# Patient Record
Sex: MALE | Race: WHITE | NOT HISPANIC OR LATINO | Employment: FULL TIME | URBAN - METROPOLITAN AREA
[De-identification: names, ages, dates, MRNs, and addresses within clinical notes are randomized per-mention and may not be internally consistent; named-entity substitution may affect disease eponyms.]

---

## 2017-02-02 PROBLEM — K50.90 CROHN'S DISEASE: Status: ACTIVE | Noted: 2017-02-02

## 2017-02-02 PROBLEM — K58.9 IBS (IRRITABLE BOWEL SYNDROME): Status: ACTIVE | Noted: 2017-02-02

## 2017-02-02 PROBLEM — G43.909 MIGRAINE: Status: ACTIVE | Noted: 2017-02-02

## 2017-02-03 ENCOUNTER — OFFICE VISIT (OUTPATIENT)
Dept: FAMILY MEDICINE CLINIC | Facility: CLINIC | Age: 37
End: 2017-02-03

## 2017-02-03 VITALS
BODY MASS INDEX: 28.44 KG/M2 | HEART RATE: 79 BPM | RESPIRATION RATE: 16 BRPM | TEMPERATURE: 98.1 F | DIASTOLIC BLOOD PRESSURE: 82 MMHG | HEIGHT: 69 IN | SYSTOLIC BLOOD PRESSURE: 122 MMHG | WEIGHT: 192 LBS | OXYGEN SATURATION: 98 %

## 2017-02-03 DIAGNOSIS — R73.03 PRE-DIABETES: Primary | ICD-10-CM

## 2017-02-03 LAB — HBA1C MFR BLD: 6.1 %

## 2017-02-03 PROCEDURE — 99213 OFFICE O/P EST LOW 20 MIN: CPT | Performed by: FAMILY MEDICINE

## 2017-02-03 PROCEDURE — 83036 HEMOGLOBIN GLYCOSYLATED A1C: CPT | Performed by: FAMILY MEDICINE

## 2017-02-03 RX ORDER — SULFASALAZINE 500 MG/1
500 TABLET ORAL 4 TIMES DAILY
COMMUNITY
End: 2020-08-31 | Stop reason: SDUPTHER

## 2017-02-03 NOTE — PROGRESS NOTES
"Subjective   Eric Ramirez is a 36 y.o. male.     Diabetes   He presents for his follow-up diabetic visit. Diabetes type: prediabetes. His disease course has been improving. Hypoglycemia symptoms include dizziness. There are no diabetic associated symptoms. Pertinent negatives for diabetes include no chest pain, no fatigue, no polydipsia, no polyphagia, no polyuria and no weakness. There are no hypoglycemic complications. Symptoms are stable. There are no diabetic complications. Risk factors for coronary artery disease include male sex and family history. Current diabetic treatment includes diet. He is compliant with treatment most of the time. An ACE inhibitor/angiotensin II receptor blocker is not being taken.        The following portions of the patient's history were reviewed and updated as appropriate: allergies, current medications, past social history and problem list.    Review of Systems   Constitutional: Negative for appetite change, diaphoresis, fatigue and unexpected weight change.   Eyes: Negative for visual disturbance.   Respiratory: Negative for chest tightness and shortness of breath.    Cardiovascular: Negative for chest pain, palpitations and leg swelling.   Gastrointestinal: Negative for diarrhea, nausea and vomiting.        UC doing well on current rx   Endocrine: Negative for polydipsia, polyphagia and polyuria.   Skin: Negative for color change.   Neurological: Positive for dizziness. Negative for weakness, light-headedness and numbness.       Objective   Visit Vitals   • /82   • Pulse 79   • Temp 98.1 °F (36.7 °C)   • Resp 16   • Ht 69\" (175.3 cm)   • Wt 192 lb (87.1 kg)   • SpO2 98%   • BMI 28.35 kg/m2     Physical Exam   Constitutional: He is oriented to person, place, and time. He appears well-developed and well-nourished. He is cooperative.   HENT:   Head: Normocephalic.   Right Ear: External ear normal.   Left Ear: External ear normal.   Nose: Nose normal.   Mouth/Throat: " Oropharynx is clear and moist. No oropharyngeal exudate.   Eyes: Conjunctivae are normal. Pupils are equal, round, and reactive to light. No scleral icterus.   Neck: Neck supple. Carotid bruit is not present. No thyromegaly present.   Cardiovascular: Normal rate and regular rhythm.    Pulmonary/Chest: Effort normal and breath sounds normal.   Abdominal: There is no hepatosplenomegaly.   Musculoskeletal: Normal range of motion.   Lymphadenopathy:     He has no cervical adenopathy.   Neurological: He is alert and oriented to person, place, and time.   No focal deficits no lateralizing signs   Skin: Skin is warm and dry. No rash noted.   Psychiatric: He has a normal mood and affect. Cognition and memory are normal.   Nursing note and vitals reviewed.      Assessment/Plan   Problem List Items Addressed This Visit        Other    Pre-diabetes - Primary    Relevant Orders    POC Glycosylated Hemoglobin (Hb A1C) (Completed)          New Medications Ordered This Visit   Medications   • sulfaSALAzine (AZULFIDINE) 500 MG tablet     Sig: Take 500 mg by mouth 4 (Four) Times a Day.   • FOLIC ACID PO     Sig: Take  by mouth Daily.     Continue diabetic diet regular exercise and follow-up in 3 months with another A1c.

## 2017-02-10 ENCOUNTER — OFFICE VISIT (OUTPATIENT)
Dept: FAMILY MEDICINE CLINIC | Facility: CLINIC | Age: 37
End: 2017-02-10

## 2017-02-10 VITALS
HEART RATE: 82 BPM | BODY MASS INDEX: 28.44 KG/M2 | RESPIRATION RATE: 16 BRPM | SYSTOLIC BLOOD PRESSURE: 128 MMHG | DIASTOLIC BLOOD PRESSURE: 86 MMHG | OXYGEN SATURATION: 96 % | WEIGHT: 192 LBS | HEIGHT: 69 IN

## 2017-02-10 DIAGNOSIS — R55 POSTURAL DIZZINESS WITH PRESYNCOPE: ICD-10-CM

## 2017-02-10 DIAGNOSIS — R68.89 FLU-LIKE SYMPTOMS: Primary | ICD-10-CM

## 2017-02-10 DIAGNOSIS — R42 POSTURAL DIZZINESS WITH PRESYNCOPE: ICD-10-CM

## 2017-02-10 LAB
EXPIRATION DATE: NORMAL
FLUAV AG NPH QL: NORMAL
FLUBV AG NPH QL: NORMAL
INTERNAL CONTROL: NORMAL
Lab: NORMAL

## 2017-02-10 PROCEDURE — 87804 INFLUENZA ASSAY W/OPTIC: CPT | Performed by: FAMILY MEDICINE

## 2017-02-10 PROCEDURE — 99213 OFFICE O/P EST LOW 20 MIN: CPT | Performed by: FAMILY MEDICINE

## 2017-02-10 RX ORDER — OSELTAMIVIR PHOSPHATE 75 MG/1
75 CAPSULE ORAL DAILY
Qty: 10 CAPSULE | Refills: 0 | Status: SHIPPED | OUTPATIENT
Start: 2017-02-10 | End: 2017-03-14

## 2017-02-10 NOTE — PROGRESS NOTES
"Subjective   Eric Ramirez is a 36 y.o. male.     Sore Throat    This is a new problem. The problem has been unchanged. There has been no fever. The pain is mild. Associated symptoms include congestion, coughing and headaches. Pertinent negatives include no ear pain or shortness of breath. He has had no exposure to strep. Exposure to: flu. He has tried acetaminophen for the symptoms.   Dizziness   This is a recurrent problem. The current episode started more than 1 month ago. The problem occurs intermittently. The problem has been unchanged. Associated symptoms include congestion, coughing and headaches. Pertinent negatives include no chest pain, chills, fatigue, fever, sore throat or vertigo. Nothing aggravates the symptoms. He has tried rest for the symptoms. The treatment provided no relief.        The following portions of the patient's history were reviewed and updated as appropriate: allergies, current medications, past social history and problem list.    Review of Systems   Constitutional: Negative for chills, fatigue and fever.   HENT: Positive for congestion, postnasal drip, rhinorrhea and sinus pressure. Negative for ear pain and sore throat.    Eyes: Positive for pain.   Respiratory: Positive for cough. Negative for chest tightness, shortness of breath and wheezing.    Cardiovascular: Negative for chest pain, palpitations and leg swelling.   Neurological: Positive for light-headedness and headaches. Negative for vertigo.   Hematological: Negative for adenopathy.       Objective   Visit Vitals   • /86   • Pulse 82   • Resp 16   • Ht 69\" (175.3 cm)   • Wt 192 lb (87.1 kg)   • SpO2 96%   • BMI 28.35 kg/m2     Physical Exam   Constitutional: He is oriented to person, place, and time. He appears well-developed and well-nourished. He is cooperative.   HENT:   Head: Normocephalic.   Right Ear: External ear normal.   Left Ear: External ear normal.   Nose: Nose normal.   Mouth/Throat: Oropharynx is clear " and moist. No oropharyngeal exudate.   Clear postnasal drip   Eyes: Conjunctivae are normal. Pupils are equal, round, and reactive to light. No scleral icterus.   Neck: Neck supple. Carotid bruit is not present. No thyromegaly present.   Cardiovascular: Normal rate and regular rhythm.    Pulmonary/Chest: Effort normal and breath sounds normal. No respiratory distress. He has no wheezes. He has no rales.   Abdominal: There is no hepatosplenomegaly.   Musculoskeletal: Normal range of motion.   Lymphadenopathy:     He has no cervical adenopathy.   Neurological: He is alert and oriented to person, place, and time.   No focal deficits no lateralizing signs   Skin: Skin is warm and dry. No rash noted.   Psychiatric: He has a normal mood and affect. Cognition and memory are normal.   Nursing note and vitals reviewed.      Assessment/Plan   Problem List Items Addressed This Visit     None      Visit Diagnoses     Flu-like symptoms    -  Primary    Relevant Orders    POC Influenza A / B (Completed)    Postural dizziness with presyncope        Relevant Orders    Treadmill Stress Test          New Medications Ordered This Visit   Medications   • oseltamivir (TAMIFLU) 75 MG capsule     Sig: Take 1 capsule by mouth Daily.     Dispense:  10 capsule     Refill:  0     Patient passed the influenza virus in his household so I recommend he take prophylaxis if symptoms worsen increase to twice a day on the Tamiflu. Return to clinic if symptoms persist or worsen. Okay stress test if that is normal we will recommend neurological evaluation of his presyncopal symptoms.

## 2017-02-17 ENCOUNTER — HOSPITAL ENCOUNTER (OUTPATIENT)
Dept: CARDIOLOGY | Facility: HOSPITAL | Age: 37
Discharge: HOME | End: 2017-02-17
Attending: FAMILY MEDICINE | Admitting: FAMILY MEDICINE

## 2017-02-17 VITALS
WEIGHT: 185 LBS | SYSTOLIC BLOOD PRESSURE: 120 MMHG | BODY MASS INDEX: 27.4 KG/M2 | HEART RATE: 86 BPM | HEIGHT: 69 IN | DIASTOLIC BLOOD PRESSURE: 88 MMHG

## 2017-02-17 DIAGNOSIS — R55 POSTURAL DIZZINESS WITH PRESYNCOPE: ICD-10-CM

## 2017-02-17 DIAGNOSIS — R42 POSTURAL DIZZINESS WITH PRESYNCOPE: ICD-10-CM

## 2017-02-17 LAB
BH CV STRESS BP STAGE 1: NORMAL
BH CV STRESS BP STAGE 2: NORMAL
BH CV STRESS BP STAGE 3: NORMAL
BH CV STRESS DURATION MIN STAGE 1: 3
BH CV STRESS DURATION MIN STAGE 2: 3
BH CV STRESS DURATION MIN STAGE 3: 3
BH CV STRESS DURATION MIN STAGE 4: 3
BH CV STRESS DURATION SEC STAGE 1: 0
BH CV STRESS DURATION SEC STAGE 2: 0
BH CV STRESS DURATION SEC STAGE 3: 0
BH CV STRESS DURATION SEC STAGE 4: 0
BH CV STRESS GRADE STAGE 1: 10
BH CV STRESS GRADE STAGE 2: 12
BH CV STRESS GRADE STAGE 3: 14
BH CV STRESS GRADE STAGE 4: 16
BH CV STRESS HR STAGE 1: 114
BH CV STRESS HR STAGE 2: 129
BH CV STRESS HR STAGE 3: 160
BH CV STRESS HR STAGE 4: 179
BH CV STRESS METS STAGE 1: 5
BH CV STRESS METS STAGE 2: 7.5
BH CV STRESS METS STAGE 3: 10
BH CV STRESS METS STAGE 4: 13.5
BH CV STRESS PROTOCOL 1: NORMAL
BH CV STRESS RECOVERY BP: NORMAL MMHG
BH CV STRESS RECOVERY HR: 96 BPM
BH CV STRESS SPEED STAGE 1: 1.7
BH CV STRESS SPEED STAGE 2: 2.5
BH CV STRESS SPEED STAGE 3: 3.4
BH CV STRESS SPEED STAGE 4: 4.2
BH CV STRESS STAGE 1: 1
BH CV STRESS STAGE 2: 2
BH CV STRESS STAGE 3: 3
BH CV STRESS STAGE 4: 4
MAXIMAL PREDICTED HEART RATE: 182 BPM
PERCENT MAX PREDICTED HR: 98.35 %
STRESS BASELINE BP: NORMAL MMHG
STRESS BASELINE HR: 93 BPM
STRESS PERCENT HR: 116 %
STRESS POST ESTIMATED WORKLOAD: 12.5 METS
STRESS POST EXERCISE DUR MIN: 10 MIN
STRESS POST EXERCISE DUR SEC: 31 SEC
STRESS POST PEAK BP: NORMAL MMHG
STRESS POST PEAK HR: 179 BPM
STRESS TARGET HR: 155 BPM

## 2017-02-17 PROCEDURE — 93018 CV STRESS TEST I&R ONLY: CPT | Performed by: INTERNAL MEDICINE

## 2017-02-17 PROCEDURE — 93017 CV STRESS TEST TRACING ONLY: CPT

## 2017-03-14 ENCOUNTER — OFFICE VISIT (OUTPATIENT)
Dept: FAMILY MEDICINE CLINIC | Facility: CLINIC | Age: 37
End: 2017-03-14

## 2017-03-14 VITALS
TEMPERATURE: 98.3 F | WEIGHT: 184 LBS | BODY MASS INDEX: 27.25 KG/M2 | DIASTOLIC BLOOD PRESSURE: 86 MMHG | SYSTOLIC BLOOD PRESSURE: 122 MMHG | OXYGEN SATURATION: 97 % | HEART RATE: 95 BPM | HEIGHT: 69 IN

## 2017-03-14 DIAGNOSIS — J40 BRONCHITIS: Primary | ICD-10-CM

## 2017-03-14 PROCEDURE — 99213 OFFICE O/P EST LOW 20 MIN: CPT | Performed by: NURSE PRACTITIONER

## 2017-03-14 RX ORDER — ALBUTEROL SULFATE 90 UG/1
2 AEROSOL, METERED RESPIRATORY (INHALATION) EVERY 4 HOURS PRN
Qty: 1 INHALER | Refills: 11 | Status: SHIPPED | OUTPATIENT
Start: 2017-03-14 | End: 2017-06-14

## 2017-03-14 RX ORDER — DEXTROMETHORPHAN HYDROBROMIDE AND PROMETHAZINE HYDROCHLORIDE 15; 6.25 MG/5ML; MG/5ML
5 SYRUP ORAL 4 TIMES DAILY PRN
Qty: 240 ML | Refills: 0 | Status: SHIPPED | OUTPATIENT
Start: 2017-03-14 | End: 2017-06-14

## 2017-03-14 RX ORDER — AZITHROMYCIN 250 MG/1
TABLET, FILM COATED ORAL
Qty: 6 TABLET | Refills: 0 | Status: SHIPPED | OUTPATIENT
Start: 2017-03-14 | End: 2017-06-14

## 2017-03-14 NOTE — PROGRESS NOTES
Subjective   Eric Ramirez is a 36 y.o. male.     History of Present Illness 4 day history of nasal and chest congestion, yellow nasal dc, cough productive of thick yellow sputum, fever and myalgia. No sob. Has ear and sinus pressure. Wife is ill with same symptoms. He had a flu vacc this year. Daughter with flu last month. Treated with Nyquil and Tylenol with some relief.    The following portions of the patient's history were reviewed and updated as appropriate: allergies, current medications, past family history, past medical history, past social history, past surgical history and problem list.    Review of Systems   Constitutional: Positive for fatigue and fever. Negative for unexpected weight change.   HENT: Positive for congestion, postnasal drip, rhinorrhea and sinus pressure. Negative for hearing loss, nosebleeds, sore throat, trouble swallowing and voice change.    Eyes: Negative for pain, discharge, redness and visual disturbance.   Respiratory: Positive for cough. Negative for chest tightness, shortness of breath and wheezing.    Cardiovascular: Negative for chest pain, palpitations and leg swelling.   Gastrointestinal: Negative for abdominal distention, abdominal pain, anal bleeding, blood in stool, constipation, diarrhea, nausea and vomiting.   Endocrine: Negative for cold intolerance, heat intolerance, polydipsia, polyphagia and polyuria.   Genitourinary: Negative for dysuria, flank pain, frequency and hematuria.   Musculoskeletal: Positive for myalgias. Negative for arthralgias, gait problem and joint swelling.   Skin: Negative for color change and rash.   Neurological: Negative for dizziness, tremors, seizures, syncope, speech difficulty, weakness, numbness and headaches.   Hematological: Negative.    Psychiatric/Behavioral: Negative.        Objective   Physical Exam   Constitutional: He is oriented to person, place, and time. He appears well-developed and well-nourished. No distress.   HENT:    Head: Normocephalic and atraumatic.   Right Ear: External ear normal.   Left Ear: External ear normal.   Nose: Nose normal.   Mouth/Throat: Oropharynx is clear and moist. No oropharyngeal exudate.   Eyes: Conjunctivae are normal. Right eye exhibits no discharge. Left eye exhibits no discharge. No scleral icterus.   Neck: Normal range of motion. Neck supple. No thyromegaly present.   Cardiovascular: Normal rate, regular rhythm, normal heart sounds and intact distal pulses.  Exam reveals no gallop and no friction rub.    No murmur heard.  Pulmonary/Chest: Effort normal and breath sounds normal. No respiratory distress. He has no wheezes. He has no rales.   Abdominal: Soft. Bowel sounds are normal. He exhibits no distension and no mass. There is no tenderness. There is no rebound and no guarding.   Musculoskeletal: Normal range of motion. He exhibits no edema or deformity.   Lymphadenopathy:     He has no cervical adenopathy.   Neurological: He is alert and oriented to person, place, and time. He has normal reflexes. He displays normal reflexes. No cranial nerve deficit. Coordination normal.   Skin: Skin is warm and dry. No rash noted.   Psychiatric: He has a normal mood and affect. His behavior is normal. Judgment and thought content normal.   Vitals reviewed.      Assessment/Plan   Eric was seen today for uri.    Diagnoses and all orders for this visit:    Bronchitis  -     azithromycin (ZITHROMAX Z-CANDIS) 250 MG tablet; Take 2 tablets the first day, then 1 tablet daily for 4 days.  -     albuterol (PROVENTIL HFA;VENTOLIN HFA) 108 (90 BASE) MCG/ACT inhaler; Inhale 2 puffs Every 4 (Four) Hours As Needed for Wheezing.  -     Chlorcyclizine-Pseudoephed 25-60 MG tablet; Take 25 mg by mouth 2 (Two) Times a Day.  -     promethazine-dextromethorphan (PROMETHAZINE-DM) 6.25-15 MG/5ML syrup; Take 5 mL by mouth 4 (Four) Times a Day As Needed for cough.        Out of window to treat for flu. Instructed patient to be in  isolation for 5 days. Increase fluids and rest. Treat symptoms with Tylenol or Ibuprofen. Follow up for chest pain, worsening cough, SOB, wheezing, or decreased urine output. Stay away from those with compromised immune system. Recommend flu vaccination next year.  Discussed the nature of the disease including, risks, complications, implications, management, safe and proper use of medications. Encouraged therapeutic lifestyle changes including low calorie diet with plenty of fruits and vegetables, daily exercise, medication compliance, and keeping scheduled follow up appointments with me and any other providers. Encouraged patient to have appointment for complete physical, fasting labs, appropriate screenings, and immunizations on an annual basis.

## 2017-05-22 RX ORDER — BUPROPION HYDROCHLORIDE 150 MG/1
TABLET ORAL
Qty: 30 TABLET | Refills: 0 | OUTPATIENT
Start: 2017-05-22

## 2017-06-14 ENCOUNTER — OFFICE VISIT (OUTPATIENT)
Dept: FAMILY MEDICINE CLINIC | Facility: CLINIC | Age: 37
End: 2017-06-14

## 2017-06-14 VITALS
HEART RATE: 86 BPM | OXYGEN SATURATION: 97 % | DIASTOLIC BLOOD PRESSURE: 84 MMHG | BODY MASS INDEX: 26.84 KG/M2 | SYSTOLIC BLOOD PRESSURE: 128 MMHG | WEIGHT: 181.2 LBS | HEIGHT: 69 IN | RESPIRATION RATE: 16 BRPM

## 2017-06-14 DIAGNOSIS — K21.9 GASTROESOPHAGEAL REFLUX DISEASE, ESOPHAGITIS PRESENCE NOT SPECIFIED: ICD-10-CM

## 2017-06-14 DIAGNOSIS — F41.9 ANXIETY: Primary | ICD-10-CM

## 2017-06-14 PROCEDURE — 99213 OFFICE O/P EST LOW 20 MIN: CPT | Performed by: FAMILY MEDICINE

## 2017-06-14 RX ORDER — OMEPRAZOLE 20 MG/1
20 CAPSULE, DELAYED RELEASE ORAL DAILY
Qty: 90 CAPSULE | Refills: 1 | Status: SHIPPED | OUTPATIENT
Start: 2017-06-14 | End: 2017-11-26 | Stop reason: SDUPTHER

## 2017-06-14 RX ORDER — BUPROPION HYDROCHLORIDE 150 MG/1
150 TABLET ORAL DAILY
Qty: 90 TABLET | Refills: 1 | Status: SHIPPED | OUTPATIENT
Start: 2017-06-14 | End: 2017-11-26 | Stop reason: SDUPTHER

## 2017-07-24 ENCOUNTER — OFFICE VISIT (OUTPATIENT)
Dept: FAMILY MEDICINE CLINIC | Facility: CLINIC | Age: 37
End: 2017-07-24

## 2017-07-24 VITALS
RESPIRATION RATE: 16 BRPM | WEIGHT: 176.6 LBS | DIASTOLIC BLOOD PRESSURE: 82 MMHG | OXYGEN SATURATION: 98 % | BODY MASS INDEX: 26.16 KG/M2 | HEART RATE: 101 BPM | TEMPERATURE: 99.1 F | HEIGHT: 69 IN | SYSTOLIC BLOOD PRESSURE: 112 MMHG

## 2017-07-24 DIAGNOSIS — J40 BRONCHITIS: Primary | ICD-10-CM

## 2017-07-24 PROCEDURE — 99213 OFFICE O/P EST LOW 20 MIN: CPT | Performed by: FAMILY MEDICINE

## 2017-07-24 RX ORDER — CEFDINIR 300 MG/1
300 CAPSULE ORAL 2 TIMES DAILY
Qty: 20 CAPSULE | Refills: 0 | Status: SHIPPED | OUTPATIENT
Start: 2017-07-24 | End: 2018-02-17

## 2017-07-24 NOTE — PROGRESS NOTES
"Subjective   Eric Ramirez is a 36 y.o. male.     Fever    This is a new (Cough and congestion started on Saturday night cough is nonproductive) problem. The current episode started in the past 7 days. The problem has been unchanged. The maximum temperature noted was 100 to 100.9 F. Associated symptoms include congestion and coughing. Pertinent negatives include no chest pain, headaches, muscle aches, nausea, sore throat, urinary pain or wheezing. He has tried acetaminophen for the symptoms. The treatment provided moderate relief.   Risk factors: recent travel    Fatigue   This is a new problem. The problem has been unchanged. Associated symptoms include congestion, coughing, fatigue and a fever. Pertinent negatives include no chest pain, chills, headaches, nausea or sore throat. He has tried acetaminophen for the symptoms.        The following portions of the patient's history were reviewed and updated as appropriate: allergies, current medications, past social history and problem list.    Review of Systems   Constitutional: Positive for fatigue and fever. Negative for chills.   HENT: Positive for congestion. Negative for sore throat.    Respiratory: Positive for cough. Negative for chest tightness, shortness of breath and wheezing.    Cardiovascular: Negative for chest pain, palpitations and leg swelling.   Gastrointestinal: Negative for nausea.   Genitourinary: Negative for dysuria and hematuria.   Neurological: Negative for headaches.       Objective   /82  Pulse 101  Temp 99.1 °F (37.3 °C)  Resp 16  Ht 69\" (175.3 cm)  Wt 176 lb 9.6 oz (80.1 kg)  SpO2 98%  BMI 26.08 kg/m2  Physical Exam   Constitutional: He is oriented to person, place, and time. He appears well-developed and well-nourished. He is cooperative.   HENT:   Head: Normocephalic.   Right Ear: External ear normal.   Left Ear: External ear normal.   Nose: Nose normal.   Mouth/Throat: Oropharynx is clear and moist. No oropharyngeal " exudate.   Eyes: Conjunctivae are normal. Pupils are equal, round, and reactive to light. No scleral icterus.   Neck: Neck supple. Carotid bruit is not present. No thyromegaly present.   Cardiovascular: Normal rate and regular rhythm.    Pulmonary/Chest: Effort normal.   Scattered upper airway rhonchi and a few more on the left   Abdominal: There is no hepatosplenomegaly.   Musculoskeletal: Normal range of motion.   Lymphadenopathy:     He has no cervical adenopathy.   Neurological: He is alert and oriented to person, place, and time.   No focal deficits no lateralizing signs   Skin: Skin is warm and dry. No rash noted.   Psychiatric: He has a normal mood and affect. Cognition and memory are normal.   Nursing note and vitals reviewed.      Assessment/Plan   Problem List Items Addressed This Visit     None      Visit Diagnoses     Bronchitis    -  Primary          New Medications Ordered This Visit   Medications   • cefdinir (OMNICEF) 300 MG capsule     Sig: Take 1 capsule by mouth 2 (Two) Times a Day.     Dispense:  20 capsule     Refill:  0     Return to clinic if symptoms persist or worsen increase hydration Mucinex DM twice a day,Has an inhaler at home which he can use as needed

## 2017-08-02 ENCOUNTER — HOSPITAL ENCOUNTER (OUTPATIENT)
Dept: GENERAL RADIOLOGY | Facility: HOSPITAL | Age: 37
Discharge: HOME | End: 2017-08-02
Attending: FAMILY MEDICINE | Admitting: FAMILY MEDICINE

## 2017-08-02 ENCOUNTER — OFFICE VISIT (OUTPATIENT)
Dept: FAMILY MEDICINE CLINIC | Facility: CLINIC | Age: 37
End: 2017-08-02

## 2017-08-02 VITALS
HEART RATE: 93 BPM | SYSTOLIC BLOOD PRESSURE: 118 MMHG | TEMPERATURE: 98.5 F | BODY MASS INDEX: 26.48 KG/M2 | OXYGEN SATURATION: 98 % | HEIGHT: 69 IN | WEIGHT: 178.8 LBS | DIASTOLIC BLOOD PRESSURE: 80 MMHG | RESPIRATION RATE: 16 BRPM

## 2017-08-02 DIAGNOSIS — J40 BRONCHITIS: Primary | ICD-10-CM

## 2017-08-02 DIAGNOSIS — J40 BRONCHITIS: ICD-10-CM

## 2017-08-02 PROCEDURE — 71020 HC CHEST PA AND LATERAL: CPT

## 2017-08-02 PROCEDURE — 99213 OFFICE O/P EST LOW 20 MIN: CPT | Performed by: FAMILY MEDICINE

## 2017-08-02 RX ORDER — AZITHROMYCIN 250 MG/1
TABLET, FILM COATED ORAL
Qty: 6 TABLET | Refills: 0 | Status: SHIPPED | OUTPATIENT
Start: 2017-08-02 | End: 2018-02-17

## 2017-08-02 NOTE — PROGRESS NOTES
"Subjective   Eric Ramirez is a 36 y.o. male.     Cough   This is a recurrent problem. The current episode started 1 to 4 weeks ago. The problem has been unchanged. The problem occurs every few minutes. The cough is non-productive. Associated symptoms include a fever. Pertinent negatives include no chest pain, chills, hemoptysis, nasal congestion, postnasal drip, rash, sore throat, shortness of breath, sweats or wheezing. The symptoms are aggravated by exercise. He has tried a beta-agonist inhaler for the symptoms. The treatment provided mild relief. His past medical history is significant for bronchitis.        The following portions of the patient's history were reviewed and updated as appropriate: allergies, current medications, past social history and problem list.    Review of Systems   Constitutional: Positive for fever. Negative for chills and fatigue.   HENT: Negative.  Negative for postnasal drip and sore throat.    Respiratory: Positive for cough and chest tightness. Negative for hemoptysis, shortness of breath and wheezing.    Cardiovascular: Negative for chest pain, palpitations and leg swelling.   Gastrointestinal: Negative for nausea.   Genitourinary: Negative for dysuria and frequency.   Musculoskeletal: Negative for arthralgias.   Skin: Negative.  Negative for rash.       Objective   /80  Pulse 93  Temp 98.5 °F (36.9 °C)  Resp 16  Ht 69\" (175.3 cm)  Wt 178 lb 12.8 oz (81.1 kg)  SpO2 98%  BMI 26.4 kg/m2  Physical Exam   Constitutional: He is oriented to person, place, and time. He appears well-developed and well-nourished. He is cooperative.   HENT:   Head: Normocephalic.   Right Ear: External ear normal.   Left Ear: External ear normal.   Nose: Nose normal.   Mouth/Throat: Oropharynx is clear and moist.   Eyes: Conjunctivae are normal. Pupils are equal, round, and reactive to light. No scleral icterus.   Neck: Neck supple. Carotid bruit is not present. No thyromegaly present. "   Cardiovascular: Normal rate and regular rhythm.    Pulmonary/Chest: Effort normal.   Upper airway rhonchi and no wheezes   Abdominal: There is no hepatosplenomegaly.   Musculoskeletal: Normal range of motion.   Lymphadenopathy:     He has no cervical adenopathy.   Neurological: He is alert and oriented to person, place, and time.   No focal deficits no lateralizing signs   Skin: Skin is warm and dry. No rash noted.   Psychiatric: He has a normal mood and affect. Cognition and memory are normal.   Nursing note and vitals reviewed.      Assessment/Plan   Problem List Items Addressed This Visit     None      Visit Diagnoses     Bronchitis    -  Primary    Relevant Orders    XR Chest 2 View          New Medications Ordered This Visit   Medications   • azithromycin (ZITHROMAX) 250 MG tablet     Sig: Take 2 tablets the first day, then 1 tablet daily for 4 days.     Dispense:  6 tablet     Refill:  0     Patient will be referred for a chest x-ray may have atypical bronchitis and/or pneumonia will notify results.

## 2017-11-27 RX ORDER — OMEPRAZOLE 20 MG/1
CAPSULE, DELAYED RELEASE ORAL
Qty: 90 CAPSULE | Refills: 0 | Status: SHIPPED | OUTPATIENT
Start: 2017-11-27 | End: 2018-02-17 | Stop reason: SDUPTHER

## 2017-11-27 RX ORDER — BUPROPION HYDROCHLORIDE 150 MG/1
TABLET ORAL
Qty: 90 TABLET | Refills: 0 | Status: SHIPPED | OUTPATIENT
Start: 2017-11-27 | End: 2018-02-17 | Stop reason: SDUPTHER

## 2018-02-17 ENCOUNTER — OFFICE VISIT (OUTPATIENT)
Dept: FAMILY MEDICINE CLINIC | Facility: CLINIC | Age: 38
End: 2018-02-17

## 2018-02-17 VITALS
DIASTOLIC BLOOD PRESSURE: 80 MMHG | BODY MASS INDEX: 27.11 KG/M2 | WEIGHT: 183 LBS | HEIGHT: 69 IN | OXYGEN SATURATION: 96 % | SYSTOLIC BLOOD PRESSURE: 120 MMHG | RESPIRATION RATE: 16 BRPM | TEMPERATURE: 98.7 F | HEART RATE: 72 BPM

## 2018-02-17 DIAGNOSIS — K21.9 GASTROESOPHAGEAL REFLUX DISEASE, ESOPHAGITIS PRESENCE NOT SPECIFIED: ICD-10-CM

## 2018-02-17 DIAGNOSIS — F41.1 GENERALIZED ANXIETY DISORDER: Primary | ICD-10-CM

## 2018-02-17 DIAGNOSIS — Z20.828 EXPOSURE TO THE FLU: ICD-10-CM

## 2018-02-17 PROCEDURE — 99213 OFFICE O/P EST LOW 20 MIN: CPT | Performed by: FAMILY MEDICINE

## 2018-02-17 RX ORDER — OSELTAMIVIR PHOSPHATE 75 MG/1
75 CAPSULE ORAL DAILY
Qty: 10 CAPSULE | Refills: 0 | Status: SHIPPED | OUTPATIENT
Start: 2018-02-17 | End: 2018-08-21

## 2018-02-17 RX ORDER — BUPROPION HYDROCHLORIDE 150 MG/1
150 TABLET ORAL EVERY MORNING
Qty: 90 TABLET | Refills: 1 | Status: SHIPPED | OUTPATIENT
Start: 2018-02-17 | End: 2018-06-18 | Stop reason: SDUPTHER

## 2018-02-17 RX ORDER — OMEPRAZOLE 20 MG/1
20 CAPSULE, DELAYED RELEASE ORAL DAILY
Qty: 90 CAPSULE | Refills: 1 | Status: SHIPPED | OUTPATIENT
Start: 2018-02-17 | End: 2018-08-21 | Stop reason: SDUPTHER

## 2018-02-19 NOTE — PROGRESS NOTES
Subjective   Eric Ramirez is a 37 y.o. male.     Heartburn   He complains of heartburn. He reports no abdominal pain, no chest pain or no nausea. This is a chronic problem. The problem occurs occasionally. The problem has been gradually improving. The symptoms are aggravated by certain foods and stress. Pertinent negatives include no fatigue, melena, orthopnea or weight loss. Risk factors include hiatal hernia. He has tried a PPI for the symptoms. The treatment provided significant relief.   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 Symptoms include nervous/anxious behavior. Patient reports no chest pain, confusion, decreased concentration, dizziness, feeling of choking, irritability, nausea, restlessness, shortness of breath or suicidal ideas. Symptoms occur occasionally. The severity of symptoms is mild. The quality of sleep is good. Nighttime awakenings: occasional.            The following portions of the patient's history were reviewed and updated as appropriate: allergies, current medications, past social history and problem list.    Review of Systems   Constitutional: Negative for appetite change, fatigue, irritability, unexpected weight change and weight loss.   HENT:        Some uri sx had strong flu expxosure   Respiratory: Negative for chest tightness and shortness of breath.    Cardiovascular: Negative for chest pain.   Gastrointestinal: Positive for heartburn. Negative for abdominal distention, abdominal pain, diarrhea, melena and nausea.        Patient experiencing heartburn/acid reflux     Neurological: Negative for dizziness, tremors, weakness, light-headedness and headaches.   Psychiatric/Behavioral: Positive for dysphoric mood and sleep disturbance. Negative for agitation, behavioral problems, confusion, decreased concentration and suicidal ideas. The patient is nervous/anxious.        Objective   /80  Pulse 72  Temp  "98.7 °F (37.1 °C)  Resp 16  Ht 175.3 cm (69\")  Wt 83 kg (183 lb)  SpO2 96%  BMI 27.02 kg/m2  Physical Exam   Constitutional: He is oriented to person, place, and time. He appears well-developed and well-nourished. He is cooperative.   HENT:   Head: Normocephalic.   Right Ear: External ear normal.   Left Ear: External ear normal.   Nose: Nose normal.   Mouth/Throat: Oropharynx is clear and moist.   Eyes: Conjunctivae are normal. Pupils are equal, round, and reactive to light. No scleral icterus.   Neck: Neck supple. Carotid bruit is not present. No thyromegaly present.   Cardiovascular: Normal rate and regular rhythm.    Pulmonary/Chest: Effort normal and breath sounds normal. He has no wheezes. He has no rales.   Abdominal: There is no hepatosplenomegaly.   Musculoskeletal: Normal range of motion.   Lymphadenopathy:     He has no cervical adenopathy.   Neurological: He is alert and oriented to person, place, and time.   No focal deficits no lateralizing signs   Skin: Skin is warm and dry. No rash noted.   Psychiatric: He has a normal mood and affect. Cognition and memory are normal.   Nursing note and vitals reviewed.      Assessment/Plan   Problem List Items Addressed This Visit     Generalized anxiety disorder - Primary    Relevant Medications    buPROPion XL (WELLBUTRIN XL) 150 MG 24 hr tablet      Other Visit Diagnoses     Gastroesophageal reflux disease, esophagitis presence not specified        Relevant Medications    omeprazole (priLOSEC) 20 MG capsule    Exposure to the flu              New Medications Ordered This Visit   Medications   • buPROPion XL (WELLBUTRIN XL) 150 MG 24 hr tablet     Sig: Take 1 tablet by mouth Every Morning.     Dispense:  90 tablet     Refill:  1   • omeprazole (priLOSEC) 20 MG capsule     Sig: Take 1 capsule by mouth Daily.     Dispense:  90 capsule     Refill:  1   • oseltamivir (TAMIFLU) 75 MG capsule     Sig: Take 1 capsule by mouth Daily.     Dispense:  10 capsule     " Refill:  0

## 2018-03-25 ENCOUNTER — OFFICE VISIT (OUTPATIENT)
Dept: RETAIL CLINIC | Facility: CLINIC | Age: 38
End: 2018-03-25

## 2018-03-25 DIAGNOSIS — B35.9 RINGWORM: Primary | ICD-10-CM

## 2018-03-25 PROCEDURE — 99213 OFFICE O/P EST LOW 20 MIN: CPT | Performed by: NURSE PRACTITIONER

## 2018-03-25 NOTE — PROGRESS NOTES
Subjective   Eric Ramirez is a 37 y.o. male.     He was wrestling on a mat with another person and noticed a small rash on right forearm. Non pruritic and no drainage.       Rash   This is a new problem. The current episode started in the past 7 days. The affected locations include the right arm.        The following portions of the patient's history were reviewed and updated as appropriate: allergies, current medications, past family history, past medical history, past social history, past surgical history and problem list.    Review of Systems   All other systems reviewed and are negative.    There were no vitals taken for this visit.   Objective   Physical Exam   Constitutional: He appears well-developed and well-nourished.   HENT:   Head: Normocephalic.   Eyes: Pupils are equal, round, and reactive to light.   Neurological: He is alert.   Skin:   Right forearm noted quarter size lesion with appearance with ringworm   Nursing note and vitals reviewed.       Assessment/Plan   Eric was seen today for rash.    Diagnoses and all orders for this visit:    Ringworm                 FERNANDA Espinal

## 2018-03-25 NOTE — PATIENT INSTRUCTIONS
Clotrimazole skin cream, lotion, or solution  What is this medicine?  CLOTRIMAZOLE (kloe TRIM a zole) is an antifungal medicine. It is used to treat certain kinds of fungal or yeast infections of the skin.  This medicine may be used for other purposes; ask your health care provider or pharmacist if you have questions.  COMMON BRAND NAME(S): Anti-Fungal, Antifungal, Cruex, Desenex, Fungoid, Lotrimin, Lotrimin AF, Lotrimin AF Ringworm  What should I tell my health care provider before I take this medicine?  They need to know if you have any of these conditions:  -an unusual or allergic reaction to clotrimazole, other antifungals or medicines, foods, dyes or preservatives  -pregnant or trying to get pregnant  -breast-feeding  How should I use this medicine?  This medicine is for external use only. Do not take by mouth. Follow the directions on the prescription label. Wash your hands before and after use. If treating a hand or nail infection, wash hands before use only. Apply a thin layer to the affected area and a small amount to the surrounding area. Rub in gently. Do not get this medicine in your eyes. If you do, rinse out with plenty of cool tap water. Use this medicine at regular intervals. Do not use more often than directed. Finish the full course prescribed by your doctor or health care professional even if you think you are better. Do not stop using except on your doctor's advice.  Talk to your pediatrician regarding the use of this medicine in children. While this drug has been used in young children for selected conditions, precautions do apply.  Overdosage: If you think you have taken too much of this medicine contact a poison control center or emergency room at once.  NOTE: This medicine is only for you. Do not share this medicine with others.  What if I miss a dose?  If you miss a dose, use it as soon as you can. If it is almost time for your next dose, use only that dose. Do not use double or extra  doses.  What may interact with this medicine?  -amphotericin b  -topical products that have nystatin  This list may not describe all possible interactions. Give your health care provider a list of all the medicines, herbs, non-prescription drugs, or dietary supplements you use. Also tell them if you smoke, drink alcohol, or use illegal drugs. Some items may interact with your medicine.  What should I watch for while using this medicine?  Tell your doctor or health care professional if your symptoms do not start to improve after 7 days. Do not self-medicate for more than one week.  If you are using this medicine for 'jock itch' be sure to dry the groin completely after bathing. Do not wear underwear that is tight-fitting or made from synthetic fibers like temo or nylon. Wear loose-fitting, cotton underwear.  If you are using this medicine for athlete's foot be sure to dry your feet carefully after bathing, especially between the toes. Do not wear socks made from wool or synthetic materials like temo or nylon. Wear clean cotton socks and change them at least once a day, change them more if your feet sweat a lot. Also, try to wear sandals or shoes that are well-ventilated.  What side effects may I notice from receiving this medicine?  Side effects that usually do not require medical attention (report to your doctor or health care professional if they continue or are bothersome):  -allergic reactions like skin rash, itching or hives, swelling of the face, lips, or tongue  -skin irritation, burning  This list may not describe all possible side effects. Call your doctor for medical advice about side effects. You may report side effects to FDA at 3-847-FDA-8381.  Where should I keep my medicine?  Keep out of the reach of children.  Store at room temperature between 2 to 30 degrees C (36 to 86 degrees F). Do not freeze. Throw away any unused medicine after the expiration date.  NOTE: This sheet is a summary. It may not  cover all possible information. If you have questions about this medicine, talk to your doctor, pharmacist, or health care provider.  © 2018 [employer]vier/Gold Standard (2017-01-18 16:30:18)  Contact Dermatitis  Dermatitis is redness, soreness, and swelling (inflammation) of the skin. Contact dermatitis is a reaction to certain substances that touch the skin. There are two types of contact dermatitis:  · Irritant contact dermatitis. This type is caused by something that irritates your skin, such as dry hands from washing them too much. This type does not require previous exposure to the substance for a reaction to occur. This type is more common.  · Allergic contact dermatitis. This type is caused by a substance that you are allergic to, such as a nickel allergy or poison ivy. This type only occurs if you have been exposed to the substance (allergen) before. Upon a repeat exposure, your body reacts to the substance. This type is less common.  What are the causes?  Many different substances can cause contact dermatitis. Irritant contact dermatitis is most commonly caused by exposure to:  · Makeup.  · Soaps.  · Detergents.  · Bleaches.  · Acids.  · Metal salts, such as nickel.  Allergic contact dermatitis is most commonly caused by exposure to:  · Poisonous plants.  · Chemicals.  · Jewelry.  · Latex.  · Medicines.  · Preservatives in products, such as clothing.  What increases the risk?  This condition is more likely to develop in:  · People who have jobs that expose them to irritants or allergens.  · People who have certain medical conditions, such as asthma or eczema.  What are the signs or symptoms?  Symptoms of this condition may occur anywhere on your body where the irritant has touched you or is touched by you. Symptoms include:  · Dryness or flaking.  · Redness.  · Cracks.  · Itching.  · Pain or a burning feeling.  · Blisters.  · Drainage of small amounts of blood or clear fluid from skin cracks.  With allergic  contact dermatitis, there may also be swelling in areas such as the eyelids, mouth, or genitals.  How is this diagnosed?  This condition is diagnosed with a medical history and physical exam. A patch skin test may be performed to help determine the cause. If the condition is related to your job, you may need to see an occupational medicine specialist.  How is this treated?  Treatment for this condition includes figuring out what caused the reaction and protecting your skin from further contact. Treatment may also include:  · Steroid creams or ointments. Oral steroid medicines may be needed in more severe cases.  · Antibiotics or antibacterial ointments, if a skin infection is present.  · Antihistamine lotion or an antihistamine taken by mouth to ease itching.  · A bandage (dressing).  Follow these instructions at home:  Skin Care   · Moisturize your skin as needed.  · Apply cool compresses to the affected areas.  · Try taking a bath with:  ¨ Epsom salts. Follow the instructions on the packaging. You can get these at your local pharmacy or grocery store.  ¨ Baking soda. Pour a small amount into the bath as directed by your health care provider.  ¨ Colloidal oatmeal. Follow the instructions on the packaging. You can get this at your local pharmacy or grocery store.  · Try applying baking soda paste to your skin. Stir water into baking soda until it reaches a paste-like consistency.  · Do not scratch your skin.  · Bathe less frequently, such as every other day.  · Bathe in lukewarm water. Avoid using hot water.  Medicines   · Take or apply over-the-counter and prescription medicines only as told by your health care provider.  · If you were prescribed an antibiotic medicine, take or apply your antibiotic as told by your health care provider. Do not stop using the antibiotic even if your condition starts to improve.  General instructions   · Keep all follow-up visits as told by your health care provider. This is  important.  · Avoid the substance that caused your reaction. If you do not know what caused it, keep a journal to try to track what caused it. Write down:  ¨ What you eat.  ¨ What cosmetic products you use.  ¨ What you drink.  ¨ What you wear in the affected area. This includes jewelry.  · If you were given a dressing, take care of it as told by your health care provider. This includes when to change and remove it.  Contact a health care provider if:  · Your condition does not improve with treatment.  · Your condition gets worse.  · You have signs of infection such as swelling, tenderness, redness, soreness, or warmth in the affected area.  · You have a fever.  · You have new symptoms.  Get help right away if:  · You have a severe headache, neck pain, or neck stiffness.  · You vomit.  · You feel very sleepy.  · You notice red streaks coming from the affected area.  · Your bone or joint underneath the affected area becomes painful after the skin has healed.  · The affected area turns darker.  · You have difficulty breathing.  This information is not intended to replace advice given to you by your health care provider. Make sure you discuss any questions you have with your health care provider.  Document Released: 12/15/2001 Document Revised: 05/25/2017 Document Reviewed: 05/04/2016  Elsevier Interactive Patient Education © 2017 [employer]

## 2018-06-18 RX ORDER — BUPROPION HYDROCHLORIDE 150 MG/1
TABLET ORAL
Qty: 90 TABLET | Refills: 0 | Status: SHIPPED | OUTPATIENT
Start: 2018-06-18 | End: 2018-08-21 | Stop reason: SDUPTHER

## 2018-08-21 ENCOUNTER — TRANSCRIBE ORDERS (OUTPATIENT)
Dept: LAB | Facility: HOSPITAL | Age: 38
End: 2018-08-21

## 2018-08-21 ENCOUNTER — LAB (OUTPATIENT)
Dept: LAB | Facility: HOSPITAL | Age: 38
End: 2018-08-21

## 2018-08-21 ENCOUNTER — OFFICE VISIT (OUTPATIENT)
Dept: FAMILY MEDICINE CLINIC | Facility: CLINIC | Age: 38
End: 2018-08-21

## 2018-08-21 VITALS
WEIGHT: 179 LBS | BODY MASS INDEX: 26.51 KG/M2 | DIASTOLIC BLOOD PRESSURE: 82 MMHG | RESPIRATION RATE: 16 BRPM | HEIGHT: 69 IN | HEART RATE: 86 BPM | SYSTOLIC BLOOD PRESSURE: 120 MMHG | OXYGEN SATURATION: 98 %

## 2018-08-21 DIAGNOSIS — R73.03 PRE-DIABETES: ICD-10-CM

## 2018-08-21 DIAGNOSIS — E78.01 FAMILIAL HYPERCHOLESTEROLEMIA: Primary | ICD-10-CM

## 2018-08-21 DIAGNOSIS — K51.919 MILD CHRONIC ULCERATIVE COLITIS WITH COMPLICATION (HCC): Primary | ICD-10-CM

## 2018-08-21 DIAGNOSIS — F41.1 GENERALIZED ANXIETY DISORDER: ICD-10-CM

## 2018-08-21 DIAGNOSIS — K51.919 MILD CHRONIC ULCERATIVE COLITIS WITH COMPLICATION (HCC): ICD-10-CM

## 2018-08-21 LAB
ALBUMIN SERPL-MCNC: 4.53 G/DL (ref 3.2–4.8)
ALBUMIN/GLOB SERPL: 2 G/DL (ref 1.5–2.5)
ALP SERPL-CCNC: 83 U/L (ref 25–100)
ALT SERPL W P-5'-P-CCNC: 39 U/L (ref 7–40)
ANION GAP SERPL CALCULATED.3IONS-SCNC: 9 MMOL/L (ref 3–11)
AST SERPL-CCNC: 36 U/L (ref 0–33)
BASOPHILS # BLD AUTO: 0.14 10*3/MM3 (ref 0–0.2)
BASOPHILS NFR BLD AUTO: 2.2 % (ref 0–1)
BILIRUB SERPL-MCNC: 0.9 MG/DL (ref 0.3–1.2)
BUN BLD-MCNC: 10 MG/DL (ref 9–23)
BUN/CREAT SERPL: 12.3 (ref 7–25)
CALCIUM SPEC-SCNC: 9.4 MG/DL (ref 8.7–10.4)
CHLORIDE SERPL-SCNC: 105 MMOL/L (ref 99–109)
CO2 SERPL-SCNC: 26 MMOL/L (ref 20–31)
CREAT BLD-MCNC: 0.81 MG/DL (ref 0.6–1.3)
CRP SERPL-MCNC: 0.24 MG/DL (ref 0–1)
DEPRECATED RDW RBC AUTO: 40.9 FL (ref 37–54)
EOSINOPHIL # BLD AUTO: 0.44 10*3/MM3 (ref 0–0.3)
EOSINOPHIL NFR BLD AUTO: 6.8 % (ref 0–3)
ERYTHROCYTE [DISTWIDTH] IN BLOOD BY AUTOMATED COUNT: 12 % (ref 11.3–14.5)
GFR SERPL CREATININE-BSD FRML MDRD: 106 ML/MIN/1.73
GLOBULIN UR ELPH-MCNC: 2.3 GM/DL
GLUCOSE BLD-MCNC: 104 MG/DL (ref 70–100)
HCT VFR BLD AUTO: 43.6 % (ref 38.9–50.9)
HGB BLD-MCNC: 14.7 G/DL (ref 13.1–17.5)
IMM GRANULOCYTES # BLD: 0.02 10*3/MM3 (ref 0–0.03)
IMM GRANULOCYTES NFR BLD: 0.3 % (ref 0–0.6)
LYMPHOCYTES # BLD AUTO: 1.37 10*3/MM3 (ref 0.6–4.8)
LYMPHOCYTES NFR BLD AUTO: 21.2 % (ref 24–44)
MCH RBC QN AUTO: 31.2 PG (ref 27–31)
MCHC RBC AUTO-ENTMCNC: 33.7 G/DL (ref 32–36)
MCV RBC AUTO: 92.6 FL (ref 80–99)
MONOCYTES # BLD AUTO: 0.52 10*3/MM3 (ref 0–1)
MONOCYTES NFR BLD AUTO: 8.1 % (ref 0–12)
NEUTROPHILS # BLD AUTO: 3.98 10*3/MM3 (ref 1.5–8.3)
NEUTROPHILS NFR BLD AUTO: 61.7 % (ref 41–71)
PLATELET # BLD AUTO: 248 10*3/MM3 (ref 150–450)
PMV BLD AUTO: 10.9 FL (ref 6–12)
POTASSIUM BLD-SCNC: 4.5 MMOL/L (ref 3.5–5.5)
PROT SERPL-MCNC: 6.8 G/DL (ref 5.7–8.2)
RBC # BLD AUTO: 4.71 10*6/MM3 (ref 4.2–5.76)
SODIUM BLD-SCNC: 140 MMOL/L (ref 132–146)
WBC NRBC COR # BLD: 6.45 10*3/MM3 (ref 3.5–10.8)

## 2018-08-21 PROCEDURE — 80053 COMPREHEN METABOLIC PANEL: CPT

## 2018-08-21 PROCEDURE — 86140 C-REACTIVE PROTEIN: CPT | Performed by: INTERNAL MEDICINE

## 2018-08-21 PROCEDURE — 36415 COLL VENOUS BLD VENIPUNCTURE: CPT | Performed by: INTERNAL MEDICINE

## 2018-08-21 PROCEDURE — 85025 COMPLETE CBC W/AUTO DIFF WBC: CPT

## 2018-08-21 PROCEDURE — 99213 OFFICE O/P EST LOW 20 MIN: CPT | Performed by: FAMILY MEDICINE

## 2018-08-21 RX ORDER — BUPROPION HYDROCHLORIDE 150 MG/1
150 TABLET ORAL EVERY MORNING
Qty: 90 TABLET | Refills: 1 | Status: SHIPPED | OUTPATIENT
Start: 2018-08-21 | End: 2020-06-19

## 2018-08-21 RX ORDER — OMEPRAZOLE 20 MG/1
20 CAPSULE, DELAYED RELEASE ORAL DAILY
Qty: 90 CAPSULE | Refills: 1 | Status: SHIPPED | OUTPATIENT
Start: 2018-08-21 | End: 2018-09-10 | Stop reason: SDUPTHER

## 2018-08-22 NOTE — PROGRESS NOTES
"Subjective   Eric Ramirez is a 37 y.o. male.     Heartburn   He complains of heartburn. He reports no abdominal pain, no chest pain, no coughing, no nausea or no sore throat. ok with med. This is a chronic problem. The problem occurs occasionally. The problem has been resolved. The heartburn is of mild intensity. Pertinent negatives include no fatigue.   Anxiety   Presents for follow-up visit. Symptoms include nervous/anxious behavior. Patient reports no chest pain, confusion, decreased concentration, depressed mood, dizziness, excessive worry, nausea, palpitations, restlessness, shortness of breath or suicidal ideas. Primary symptoms comment: ok with med. The quality of sleep is fair. Nighttime awakenings: occasional.            The following portions of the patient's history were reviewed and updated as appropriate: allergies, current medications, past social history and problem list.    Review of Systems   Constitutional: Negative for appetite change and fatigue.   HENT: Positive for postnasal drip. Negative for congestion and sore throat.    Respiratory: Negative for cough, chest tightness and shortness of breath.    Cardiovascular: Negative for chest pain, palpitations and leg swelling.   Gastrointestinal: Positive for heartburn. Negative for abdominal pain, diarrhea and nausea.   Neurological: Negative for dizziness, tremors, weakness, light-headedness and headaches.   Psychiatric/Behavioral: Positive for sleep disturbance. Negative for agitation, behavioral problems, confusion, decreased concentration, dysphoric mood and suicidal ideas. The patient is nervous/anxious.         Changing shifts a lot at work       Objective   /82   Pulse 86   Resp 16   Ht 175.3 cm (69\")   Wt 81.2 kg (179 lb)   SpO2 98%   BMI 26.43 kg/m²   Physical Exam   Constitutional: He is oriented to person, place, and time. He appears well-developed and well-nourished. He is cooperative.   HENT:   Head: Normocephalic. "   Right Ear: External ear normal.   Left Ear: External ear normal.   Nose: Nose normal.   Mouth/Throat: Oropharynx is clear and moist.   Eyes: Pupils are equal, round, and reactive to light. Conjunctivae are normal. No scleral icterus.   Neck: Neck supple. Carotid bruit is not present. No thyromegaly present.   Cardiovascular: Normal rate and regular rhythm.    Pulmonary/Chest: Effort normal and breath sounds normal.   Abdominal: There is no hepatosplenomegaly.   Musculoskeletal: Normal range of motion.   Neurological: He is alert and oriented to person, place, and time.   No focal deficits no lateralizing signs   Skin: Skin is warm and dry. No rash noted.   Psychiatric: He has a normal mood and affect. Cognition and memory are normal.   Nursing note and vitals reviewed.      Assessment/Plan   Problem List Items Addressed This Visit     Generalized anxiety disorder    Relevant Medications    buPROPion XL (WELLBUTRIN XL) 150 MG 24 hr tablet    Pre-diabetes    Relevant Orders    Comprehensive Metabolic Panel    TSH      Other Visit Diagnoses     Familial hypercholesterolemia    -  Primary    Relevant Orders    Comprehensive Metabolic Panel    Lipid Panel    TSH    CBC & Differential          New Medications Ordered This Visit   Medications   • buPROPion XL (WELLBUTRIN XL) 150 MG 24 hr tablet     Sig: Take 1 tablet by mouth Every Morning.     Dispense:  90 tablet     Refill:  1   • omeprazole (priLOSEC) 20 MG capsule     Sig: Take 1 capsule by mouth Daily.     Dispense:  90 capsule     Refill:  1       Consider statin pending lab,

## 2018-09-10 ENCOUNTER — OFFICE VISIT (OUTPATIENT)
Dept: FAMILY MEDICINE CLINIC | Facility: CLINIC | Age: 38
End: 2018-09-10

## 2018-09-10 VITALS
BODY MASS INDEX: 26.81 KG/M2 | WEIGHT: 181 LBS | TEMPERATURE: 97.7 F | HEART RATE: 95 BPM | RESPIRATION RATE: 16 BRPM | DIASTOLIC BLOOD PRESSURE: 82 MMHG | HEIGHT: 69 IN | OXYGEN SATURATION: 98 % | SYSTOLIC BLOOD PRESSURE: 122 MMHG

## 2018-09-10 DIAGNOSIS — J02.9 ACUTE PHARYNGITIS, UNSPECIFIED ETIOLOGY: Primary | ICD-10-CM

## 2018-09-10 DIAGNOSIS — K21.9 GASTROESOPHAGEAL REFLUX DISEASE, ESOPHAGITIS PRESENCE NOT SPECIFIED: ICD-10-CM

## 2018-09-10 PROCEDURE — 99213 OFFICE O/P EST LOW 20 MIN: CPT | Performed by: FAMILY MEDICINE

## 2018-09-10 RX ORDER — AZITHROMYCIN 250 MG/1
TABLET, FILM COATED ORAL
Qty: 6 TABLET | Refills: 0 | Status: SHIPPED | OUTPATIENT
Start: 2018-09-10 | End: 2020-03-19

## 2018-09-10 RX ORDER — OMEPRAZOLE 20 MG/1
20 CAPSULE, DELAYED RELEASE ORAL DAILY
Qty: 90 CAPSULE | Refills: 1 | Status: SHIPPED | OUTPATIENT
Start: 2018-09-10 | End: 2020-09-25 | Stop reason: SDUPTHER

## 2018-09-10 NOTE — PROGRESS NOTES
"Subjective   Eric Ramirez is a 37 y.o. male.     Sore Throat    This is a new problem. The current episode started in the past 7 days. The problem has been gradually worsening. There has been no fever. The pain is mild. Associated symptoms include congestion, coughing, ear pain and headaches. Pertinent negatives include no diarrhea, plugged ear sensation, shortness of breath or vomiting. He has tried acetaminophen for the symptoms. The treatment provided mild relief.        The following portions of the patient's history were reviewed and updated as appropriate: allergies, current medications, past social history and problem list.    Review of Systems   Constitutional: Negative for chills, fatigue and fever.   HENT: Positive for congestion, ear pain, postnasal drip and rhinorrhea. Negative for sinus pressure and sore throat.    Eyes: Negative for pain.   Respiratory: Positive for cough. Negative for shortness of breath.    Cardiovascular: Negative for chest pain and palpitations.   Gastrointestinal: Negative for diarrhea, nausea and vomiting.   Genitourinary: Negative for dysuria and hematuria.   Skin: Negative.    Neurological: Positive for headaches. Negative for dizziness.   Hematological: Negative for adenopathy.       Objective   /82   Pulse 95   Temp 97.7 °F (36.5 °C)   Resp 16   Ht 175.3 cm (69\")   Wt 82.1 kg (181 lb)   SpO2 98%   BMI 26.73 kg/m²   Physical Exam   Constitutional: He is oriented to person, place, and time. He appears well-developed and well-nourished. He is cooperative.   HENT:   Head: Normocephalic.   Right Ear: External ear normal.   Left Ear: External ear normal.   Nose: Nose normal.   Mouth/Throat: Oropharynx is clear and moist.   Throat is red no exudate cloudy postnasal drip TMs are normal   Eyes: Pupils are equal, round, and reactive to light. Conjunctivae are normal. No scleral icterus.   Neck: Neck supple. Carotid bruit is not present. No thyromegaly present. "   Cardiovascular: Normal rate and regular rhythm.    Pulmonary/Chest: Effort normal and breath sounds normal.   Abdominal: There is no hepatosplenomegaly.   Musculoskeletal: Normal range of motion.   Neurological: He is alert and oriented to person, place, and time.   No focal deficits no lateralizing signs   Skin: Skin is warm and dry. No rash noted.   Psychiatric: He has a normal mood and affect. Cognition and memory are normal.   Nursing note and vitals reviewed.      Assessment/Plan   Problem List Items Addressed This Visit     None      Visit Diagnoses     Acute pharyngitis, unspecified etiology    -  Primary    Gastroesophageal reflux disease, esophagitis presence not specified        Relevant Medications    omeprazole (priLOSEC) 20 MG capsule          New Medications Ordered This Visit   Medications   • omeprazole (priLOSEC) 20 MG capsule     Sig: Take 1 capsule by mouth Daily.     Dispense:  90 capsule     Refill:  1   • azithromycin (ZITHROMAX) 250 MG tablet     Sig: Take 2 tablets the first day, then 1 tablet daily for 4 days.     Dispense:  6 tablet     Refill:  0

## 2018-09-25 ENCOUNTER — LAB (OUTPATIENT)
Dept: LAB | Facility: HOSPITAL | Age: 38
End: 2018-09-25

## 2018-09-25 DIAGNOSIS — E78.01 FAMILIAL HYPERCHOLESTEROLEMIA: ICD-10-CM

## 2018-09-25 DIAGNOSIS — R73.03 PRE-DIABETES: ICD-10-CM

## 2018-09-25 LAB
ALBUMIN SERPL-MCNC: 4.48 G/DL (ref 3.2–4.8)
ALBUMIN/GLOB SERPL: 2.1 G/DL (ref 1.5–2.5)
ALP SERPL-CCNC: 99 U/L (ref 25–100)
ALT SERPL W P-5'-P-CCNC: 46 U/L (ref 7–40)
ANION GAP SERPL CALCULATED.3IONS-SCNC: 14 MMOL/L (ref 3–11)
ARTICHOKE IGE QN: 138 MG/DL (ref 0–130)
AST SERPL-CCNC: 34 U/L (ref 0–33)
BASOPHILS # BLD AUTO: 0.11 10*3/MM3 (ref 0–0.2)
BASOPHILS NFR BLD AUTO: 1.5 % (ref 0–1)
BILIRUB SERPL-MCNC: 0.9 MG/DL (ref 0.3–1.2)
BUN BLD-MCNC: 12 MG/DL (ref 9–23)
BUN/CREAT SERPL: 13.5 (ref 7–25)
CALCIUM SPEC-SCNC: 9.4 MG/DL (ref 8.7–10.4)
CHLORIDE SERPL-SCNC: 103 MMOL/L (ref 99–109)
CHOLEST SERPL-MCNC: 186 MG/DL (ref 0–200)
CO2 SERPL-SCNC: 27 MMOL/L (ref 20–31)
CREAT BLD-MCNC: 0.89 MG/DL (ref 0.6–1.3)
DEPRECATED RDW RBC AUTO: 42.4 FL (ref 37–54)
EOSINOPHIL # BLD AUTO: 0.32 10*3/MM3 (ref 0–0.3)
EOSINOPHIL NFR BLD AUTO: 4.4 % (ref 0–3)
ERYTHROCYTE [DISTWIDTH] IN BLOOD BY AUTOMATED COUNT: 12.6 % (ref 11.3–14.5)
GFR SERPL CREATININE-BSD FRML MDRD: 95 ML/MIN/1.73
GLOBULIN UR ELPH-MCNC: 2.1 GM/DL
GLUCOSE BLD-MCNC: 143 MG/DL (ref 70–100)
HCT VFR BLD AUTO: 44 % (ref 38.9–50.9)
HDLC SERPL-MCNC: 47 MG/DL (ref 40–60)
HGB BLD-MCNC: 14.7 G/DL (ref 13.1–17.5)
IMM GRANULOCYTES # BLD: 0.01 10*3/MM3 (ref 0–0.03)
IMM GRANULOCYTES NFR BLD: 0.1 % (ref 0–0.6)
LYMPHOCYTES # BLD AUTO: 1.38 10*3/MM3 (ref 0.6–4.8)
LYMPHOCYTES NFR BLD AUTO: 19 % (ref 24–44)
MCH RBC QN AUTO: 31 PG (ref 27–31)
MCHC RBC AUTO-ENTMCNC: 33.4 G/DL (ref 32–36)
MCV RBC AUTO: 92.8 FL (ref 80–99)
MONOCYTES # BLD AUTO: 0.46 10*3/MM3 (ref 0–1)
MONOCYTES NFR BLD AUTO: 6.3 % (ref 0–12)
NEUTROPHILS # BLD AUTO: 5.01 10*3/MM3 (ref 1.5–8.3)
NEUTROPHILS NFR BLD AUTO: 68.8 % (ref 41–71)
PLATELET # BLD AUTO: 245 10*3/MM3 (ref 150–450)
PMV BLD AUTO: 11.2 FL (ref 6–12)
POTASSIUM BLD-SCNC: 4.2 MMOL/L (ref 3.5–5.5)
PROT SERPL-MCNC: 6.6 G/DL (ref 5.7–8.2)
RBC # BLD AUTO: 4.74 10*6/MM3 (ref 4.2–5.76)
SODIUM BLD-SCNC: 144 MMOL/L (ref 132–146)
TRIGL SERPL-MCNC: 81 MG/DL (ref 0–150)
TSH SERPL DL<=0.05 MIU/L-ACNC: 2.52 MIU/ML (ref 0.35–5.35)
WBC NRBC COR # BLD: 7.28 10*3/MM3 (ref 3.5–10.8)

## 2018-09-25 PROCEDURE — 85025 COMPLETE CBC W/AUTO DIFF WBC: CPT

## 2018-09-25 PROCEDURE — 80053 COMPREHEN METABOLIC PANEL: CPT

## 2018-09-25 PROCEDURE — 36415 COLL VENOUS BLD VENIPUNCTURE: CPT

## 2018-09-25 PROCEDURE — 84443 ASSAY THYROID STIM HORMONE: CPT

## 2018-09-25 PROCEDURE — 80061 LIPID PANEL: CPT

## 2018-10-03 ENCOUNTER — LAB (OUTPATIENT)
Dept: LAB | Facility: HOSPITAL | Age: 38
End: 2018-10-03

## 2018-10-03 ENCOUNTER — TRANSCRIBE ORDERS (OUTPATIENT)
Dept: LAB | Facility: HOSPITAL | Age: 38
End: 2018-10-03

## 2018-10-03 DIAGNOSIS — Z00.00 ROUTINE GENERAL MEDICAL EXAMINATION AT A HEALTH CARE FACILITY: ICD-10-CM

## 2018-10-03 DIAGNOSIS — Z00.00 ROUTINE GENERAL MEDICAL EXAMINATION AT A HEALTH CARE FACILITY: Primary | ICD-10-CM

## 2018-10-03 LAB
BACTERIA UR QL AUTO: NORMAL /HPF
BILIRUB UR QL STRIP: NEGATIVE
CLARITY UR: CLEAR
COLOR UR: YELLOW
GLUCOSE UR STRIP-MCNC: ABNORMAL MG/DL
HGB UR QL STRIP.AUTO: NEGATIVE
HYALINE CASTS UR QL AUTO: NORMAL /LPF
KETONES UR QL STRIP: NEGATIVE
LEUKOCYTE ESTERASE UR QL STRIP.AUTO: NEGATIVE
NITRITE UR QL STRIP: NEGATIVE
PH UR STRIP.AUTO: 5.5 [PH] (ref 5–8)
PROT UR QL STRIP: NEGATIVE
RBC # UR: NORMAL /HPF
REF LAB TEST METHOD: NORMAL
SP GR UR STRIP: 1.02 (ref 1–1.03)
SQUAMOUS #/AREA URNS HPF: NORMAL /HPF
UROBILINOGEN UR QL STRIP: ABNORMAL
WBC UR QL AUTO: NORMAL /HPF

## 2018-10-03 PROCEDURE — 81001 URINALYSIS AUTO W/SCOPE: CPT

## 2019-06-13 RX ORDER — BUPROPION HYDROCHLORIDE 150 MG/1
TABLET ORAL
Qty: 90 TABLET | Refills: 0 | OUTPATIENT
Start: 2019-06-13

## 2019-06-20 ENCOUNTER — TELEPHONE (OUTPATIENT)
Dept: FAMILY MEDICINE CLINIC | Facility: CLINIC | Age: 39
End: 2019-06-20

## 2019-06-20 NOTE — TELEPHONE ENCOUNTER
Patient called in stating that he was out of Wellbutrin. Told patient due to office policy I would not be able to send in a refill for this medication because the last time it was wrote was 08/2018. Patient was wrote a 90 day supply and a refill. Told patient in order to get this medication Dr. Ramos would have to approve the refill and he was not currently in the office. Patient became angry and stated he could not wait and that he took this medication everyday and he needed it refilled. Patient stated it had to be done. Informed patient he would need to come in and see another provider and they may be able to write this medication if he was unable to wait for Dr. Ramos to be back in the office. Patient agreed and was transferred to Megan Cedeño to schedule an appointment with Bri Ferreira. Once on the phone to schedule appointment patient refused to schedule the appointment.

## 2019-06-20 NOTE — TELEPHONE ENCOUNTER
PT CALLED WANTED A REFILL ON HIS buPROPion XL (WELLBUTRIN XL) 150 MG 24 hr tablet MARY SPOKE WITH PT AND ADVISED HIM THAT A PROVIDER WOULD HAVE TO SEE HIM BECAUSE IT WAS PRESCRIBED IN [facility] FOR 90 DAY SUPPLY WITH ONE REFILL. DR LEWIS HAS NOT WRITTEN THE SCRIPT SINCE [facility] MARY ADVISED HIM HE WOULD NEED TO BE SEEN DR LEWIS OUT OF TOWN AND PT WAS OFFERED AN APPOINTMENT WITH [facility] AND REFUSED SAID HE WOULD GO SOMEWHERE ELSE. PT STATED WE WERE GOING TO BE RESPONSIBLE FOR ANY ILL EFFECTS THAT HE MAY HAVE.

## 2019-06-21 RX ORDER — BUPROPION HYDROCHLORIDE 150 MG/1
150 TABLET ORAL EVERY MORNING
Qty: 90 TABLET | Refills: 1 | OUTPATIENT
Start: 2019-06-21

## 2020-03-19 ENCOUNTER — LAB (OUTPATIENT)
Dept: LAB | Facility: HOSPITAL | Age: 40
End: 2020-03-19

## 2020-03-19 ENCOUNTER — OFFICE VISIT (OUTPATIENT)
Dept: GASTROENTEROLOGY | Facility: CLINIC | Age: 40
End: 2020-03-19

## 2020-03-19 VITALS
HEART RATE: 93 BPM | SYSTOLIC BLOOD PRESSURE: 154 MMHG | BODY MASS INDEX: 26.58 KG/M2 | WEIGHT: 180 LBS | DIASTOLIC BLOOD PRESSURE: 103 MMHG | TEMPERATURE: 98.4 F

## 2020-03-19 DIAGNOSIS — M25.542 ARTHRALGIA OF BOTH HANDS: ICD-10-CM

## 2020-03-19 DIAGNOSIS — Z82.69 FAMILY HISTORY OF SCLERODERMA: ICD-10-CM

## 2020-03-19 DIAGNOSIS — Z91.89 HIGH RISK FOR COLON CANCER: ICD-10-CM

## 2020-03-19 DIAGNOSIS — M25.541 ARTHRALGIA OF BOTH HANDS: ICD-10-CM

## 2020-03-19 DIAGNOSIS — R74.8 ELEVATED LIVER ENZYMES: ICD-10-CM

## 2020-03-19 DIAGNOSIS — K51.019 ULCERATIVE PANCOLITIS WITH COMPLICATION (HCC): ICD-10-CM

## 2020-03-19 DIAGNOSIS — K51.019 ULCERATIVE PANCOLITIS WITH COMPLICATION (HCC): Primary | ICD-10-CM

## 2020-03-19 LAB
ALBUMIN SERPL-MCNC: 5.1 G/DL (ref 3.5–5.2)
ALBUMIN/GLOB SERPL: 2.1 G/DL
ALP SERPL-CCNC: 92 U/L (ref 39–117)
ALT SERPL W P-5'-P-CCNC: 33 U/L (ref 1–41)
ANION GAP SERPL CALCULATED.3IONS-SCNC: 10.8 MMOL/L (ref 5–15)
AST SERPL-CCNC: 19 U/L (ref 1–40)
BILIRUB SERPL-MCNC: 0.9 MG/DL (ref 0.2–1.2)
BUN BLD-MCNC: 8 MG/DL (ref 6–20)
BUN/CREAT SERPL: 7.7 (ref 7–25)
CALCIUM SPEC-SCNC: 9.9 MG/DL (ref 8.6–10.5)
CHLORIDE SERPL-SCNC: 96 MMOL/L (ref 98–107)
CO2 SERPL-SCNC: 25.2 MMOL/L (ref 22–29)
CREAT BLD-MCNC: 1.04 MG/DL (ref 0.76–1.27)
GFR SERPL CREATININE-BSD FRML MDRD: 79 ML/MIN/1.73
GLOBULIN UR ELPH-MCNC: 2.4 GM/DL
GLUCOSE BLD-MCNC: 273 MG/DL (ref 65–99)
POTASSIUM BLD-SCNC: 4.4 MMOL/L (ref 3.5–5.2)
PROT SERPL-MCNC: 7.5 G/DL (ref 6–8.5)
SODIUM BLD-SCNC: 132 MMOL/L (ref 136–145)

## 2020-03-19 PROCEDURE — 36415 COLL VENOUS BLD VENIPUNCTURE: CPT

## 2020-03-19 PROCEDURE — 80053 COMPREHEN METABOLIC PANEL: CPT

## 2020-03-19 PROCEDURE — 99204 OFFICE O/P NEW MOD 45 MIN: CPT | Performed by: INTERNAL MEDICINE

## 2020-03-19 NOTE — PROGRESS NOTES
"GASTROENTEROLOGY OFFICE NOTE  Eric Ramirez  [id number]  1980    CARE TEAM  Patient Care Team:  Alcides Ramos MD as PCP - General  Myriam Nelson MD as Consulting Physician (Colon and Rectal Surgery)    FERNANDA Smith     Chief Complaint   Patient presents with   • Ulcerative Colitis     New pt. ulcerative colitis        HISTORY OF PRESENT ILLNESS:  First visit for this 39-year-old white male with history of ulcerative colitis diagnosed in 2013 by Dr. Myriam Mar.  He is referred by his primary care physician.    He gives a history of having presented with chronic diarrhea.  In 2013 a colonoscopy revealed ulcerative colitis.  He does not recall the extent of involvement of his colon.  He was subsequently treated with Lialda and a brief course of steroids.  His last colonoscopy report that I have available from 2016 revealed \"colitis \"moderate \"in the cecum, ascending and transverse colon.      He was not happy with 's management and transitioned his care over to [facility] where he was seen by Dr. Chaves.  While under her care he was very pleased with the improvement of his symptoms with sulfasalazine which he takes currently at a dose of 2 g twice daily along with folic acid.  With this regimen his GI symptoms seem to be in remission without any dysphagia to solids, odynophagia, early satiety unexplained weight loss, melanotic stools, bright red blood per rectum, constipation or diarrhea.    Due to a change in [facility] clinic billing (outpatient clinic visits being billed as hospital-based facility visits), his office visits were going to cost him over thousand dollars per visit and therefore he has had to transition out of care at [facility].    He does complain of arthralgias and is concerned because his sister ultimately passed away of severe scleroderma and he wonders whether he may have a rheumatological disease independent of his inflammatory " bowel disease.        PAST MEDICAL HISTORY  Past Medical History:   Diagnosis Date   • Acute pharyngitis    • Bronchitis    • Chest wall injury    • Gallbladder disease    • Gastritis    • Generalized anxiety disorder    • Internal hemorrhoids    • Labyrinthitis    • Palpitations    • Peptic ulcer    • Pre-diabetes    • Rectal hemorrhage    • Sinusitis    • Ulcerative colitis (CMS/HCC)    • URI (upper respiratory infection)         PAST SURGICAL HISTORY  Past Surgical History:   Procedure Laterality Date   • CHOLECYSTECTOMY     • COLONOSCOPY  07/28/16Maxine Nelson        MEDICATIONS:    Current Outpatient Medications:   •  buPROPion XL (WELLBUTRIN XL) 150 MG 24 hr tablet, Take 1 tablet by mouth Every Morning., Disp: 90 tablet, Rfl: 1  •  Cetirizine HCl (ZYRTEC ALLERGY PO), Take 1 tablet by mouth daily., Disp: , Rfl:   •  FOLIC ACID PO, Take 1 tablet by mouth Daily., Disp: , Rfl:   •  omeprazole (priLOSEC) 20 MG capsule, Take 1 capsule by mouth Daily., Disp: 90 capsule, Rfl: 1  •  Probiotic Product (PROBIOTIC DAILY PO), Take 1 tablet by mouth Daily., Disp: , Rfl:   •  sulfaSALAzine (AZULFIDINE) 500 MG tablet, Take 500 mg by mouth 4 (Four) Times a Day., Disp: , Rfl:     ALLERGIES  Allergies   Allergen Reactions   • Levofloxacin Itching     Throat itching, eyes watering   • Buspirone Itching   • Flagyl [Metronidazole] Hives and Swelling       FAMILY HISTORY:  Family History   Problem Relation Age of Onset   • Hypertension Mother    • Melanoma Father    • Heart attack Father    • Scleroderma Sister    • Pulmonary fibrosis Sister    • Colon polyps Neg Hx    • Colon cancer Neg Hx        SOCIAL HISTORY  Social History     Socioeconomic History   • Marital status:      Spouse name: Not on file   • Number of children: Not on file   • Years of education: Not on file   • Highest education level: Not on file   Tobacco Use   • Smoking status: Former Smoker     Packs/day: 1.00     Years: 15.00     Pack years: 15.00      Types: Cigarettes   • Smokeless tobacco: Never Used   Substance and Sexual Activity   • Alcohol use: Yes     Alcohol/week: 2.0 - 3.0 standard drinks     Types: 2 - 3 Cans of beer per week     Comment: social     • Drug use: No   • Sexual activity: Yes     Partners: Female     Socioeconomic History:  He is  and is a  with 3 children.  He is a  in the city of [place].  He is a non-smoker and only occasionally drinks alcoholic beverages.       REVIEW OF SYSTEMS  Review of Systems   Constitutional: Positive for fatigue.   HENT: Positive for nosebleeds and sinus pressure.    Respiratory: Negative.    Cardiovascular: Negative.    Gastrointestinal: Positive for vomiting.   Endocrine: Negative.    Musculoskeletal: Positive for arthralgias and joint swelling.   Allergic/Immunologic: Positive for environmental allergies.   Neurological: Positive for light-headedness and numbness.   Psychiatric/Behavioral: Positive for agitation, dysphoric mood, sleep disturbance, depressed mood and stress. The patient is nervous/anxious.      I have reviewed the above-noted review of systems    PHYSICAL EXAM   BP (!) 154/103   Pulse 93   General: Alert and oriented x 3. In no apparent or acute distress.  and No stigmata of chronic liver disease  HEENT: Anicteric slcera. Normal oropharynx  Neck: Supple. Without lymphadenopathy  CV: Regular rate and rhythm, S1, S2  Lungs: Clear to ausculation. Without rales, robchi and wheezing  Abdomen:  Soft,non-distended without palpable masses or hepatosplenomeagaly, areas of rebound tenderness or guarding.   Extremeties: without clubbing, cyanosis or edema  Neurologic:  Alert and oriented x 3 without focal motor or sensory deficits  Rectal exam: deferred        Results Review:  I reviewed the patient's new clinical results.  He has a history of elevated serum liver enzymes with AST and ALT elevation a few years ago.      ASSESSMENT  1.-  History of ulcerative  colitis.  I do have limited documentation of his initial colonoscopy indicating the extent of his disease but the fact that his 2016 colonoscopy, while he was relatively asymptomatic and already on therapy, showed right-sided disease would indicate that he had pancolitis.  I will have to get copies of his original report to settle this.  We will also have to get copies from [facility] see what other work-up was done.  All that said, he seems to be doing well on sulfasalazine 2 g p.o. twice daily  2.-  Arthralgias/arthritis with swelling of fingers and hands mostly.  This would be a little atypical for inflammatory bowel disease which usually affects large joints and therefore I think a rheumatological evaluation for exclusion of rheumatological disease, particularly given his family history, would be a good idea.  He will look into finding out the name of the rheumatologist that took care of his sister and, if he needs a referral will call us so we can help him set up an appointment.  3.-  Elevated serum liver enzymes.  Of course the concern would be for primary sclerosing cholangitis.  We will need to recheck his serum liver enzymes.  If still elevated an MRCP to rule out PSC is recommended  4.-  Greater than average risk for colon cancer based on what would appear to be a 7-year history of pancolitis.  Random colon biopsies are recommended every 1 to 2 years and he is therefore due for his colonoscopy since last one was in 2016 but, given the current coronavirus pandemic, elective procedures are being deferred.  He is understanding of this and will discuss an appropriate timing at a later date when conditions will allow.    PLAN  1.-  Obtain old records from [facility] and from  [facility]/Dr. Myriam Mar  2.-  Recheck serum liver enzymes  3.-  MRCP if liver enzymes are elevated  4.-  Continue sulfasalazine 2 g p.o. twice daily and folic acid  "replacement  5.-  Colonoscopy at later date once elective procedures are once again allowed  6.-  Return appointment in 6 to 8 weeks.    I discussed the patients findings and my recommendations with patient    Aaron Montano MD  3/19/2020   14:01    Addendum  March 24, 2020  [facility] records received.  Records indicate \"colitis \"diagnosed in 2014 with symptoms since 2009 and last colonoscopy of July 2016 showing active disease visually but not on biopsy.  -Records indicate prior failure to budesonide, Lialda and Asacol  -Pancolitis is noted on his 2014 colonoscopy.  -T PMT activity level noted to be normal.  -2017 calprotectin was within normal limits    Much of this note is an electronic transcription of spoken language to printed text. Electronic transcription of spoken language may permit erroneous, nonsensical word phrases to be inadvertently transcribed.  Although I have reviewed the note for these errors, some may still be present.                "

## 2020-03-20 ENCOUNTER — TELEPHONE (OUTPATIENT)
Dept: GASTROENTEROLOGY | Facility: CLINIC | Age: 40
End: 2020-03-20

## 2020-03-20 DIAGNOSIS — R74.8 ELEVATED LIVER ENZYMES: Primary | ICD-10-CM

## 2020-03-20 PROBLEM — Z82.69 FAMILY HISTORY OF SCLERODERMA: Status: ACTIVE | Noted: 2020-03-20

## 2020-03-20 PROBLEM — Z91.89 HIGH RISK FOR COLON CANCER: Status: ACTIVE | Noted: 2020-03-20

## 2020-03-20 NOTE — PROGRESS NOTES
Great news!  We were concerned about previously elevated serum liver enzymes.  His current liver enzymes are rocksolid normal.  Specifically his ALT and AST are well within normal range and therefore I think just to be on the safe side it would be a good idea to recheck his serum liver enzymes every 6 months over the next couple years.  My concern for the possibility of primary sclerosing cholangitis is minimal at this time.

## 2020-03-20 NOTE — TELEPHONE ENCOUNTER
I will send  a message to confirm he wants to refer the patient to the [facility] .    Thank you,    YUMIKO Segura

## 2020-03-20 NOTE — TELEPHONE ENCOUNTER
Called Dr.Jennifer Nelson's office and requested a copy of patients colonoscopy in 2014. Confirmed with Marni they will fax the procedure with any pathology to [phone].      Faxed a Request to  and requested Dr. Chaves records on the patient.

## 2020-03-20 NOTE — TELEPHONE ENCOUNTER
Patients wife left me a voicemail yesterday afternoon. She stated that they were going to look into where they wanted you to send the referral to. They've decided they want it sent to the  [facility] Dr. Memo Granados.

## 2020-03-24 ENCOUNTER — TELEPHONE (OUTPATIENT)
Dept: GASTROENTEROLOGY | Facility: CLINIC | Age: 40
End: 2020-03-24

## 2020-03-24 NOTE — TELEPHONE ENCOUNTER
Called patient to notify him we faxed the referral to  at the [facility].       No answer and LVM

## 2020-03-24 NOTE — TELEPHONE ENCOUNTER
"----- Message from Aaron Montano MD sent at 3/23/2020  8:10 AM EDT -----  Imelda Hardin to refer him to Dr. Granados.  Family history of scleroderma and personal history of arthralgias/arthritis.  Rule out scleroderma/other rheumatological diseases.  Thanks  Dr. GRIDER  ----- Message -----  From: Imelda Phelps MA  Sent: 3/20/2020   4:45 PM EDT  To: Aaron Montano MD    \"Patients wife left me a voicemail yesterday afternoon. She stated that they were going to look into where they wanted you to send the referral to. They've decided they want it sent to the  [facility] Dr. Memo Granados. \" Message sent by Kayode Bernal    Do you want to refer the patient to the [facility]?      If yes, What is the reason for the referral?     "

## 2020-04-01 ENCOUNTER — TRANSCRIBE ORDERS (OUTPATIENT)
Dept: LAB | Facility: HOSPITAL | Age: 40
End: 2020-04-01

## 2020-04-01 DIAGNOSIS — K51.919 ULCERATIVE COLITIS WITH COMPLICATION, UNSPECIFIED LOCATION (HCC): Primary | ICD-10-CM

## 2020-04-01 DIAGNOSIS — M07.60 ENTEROPATHIC ARTHRITIS: ICD-10-CM

## 2020-04-01 DIAGNOSIS — Z51.81 ENCOUNTER FOR THERAPEUTIC DRUG MONITORING: ICD-10-CM

## 2020-04-01 DIAGNOSIS — R53.83 OTHER FATIGUE: ICD-10-CM

## 2020-04-03 ENCOUNTER — LAB (OUTPATIENT)
Dept: LAB | Facility: HOSPITAL | Age: 40
End: 2020-04-03

## 2020-04-03 DIAGNOSIS — Z51.81 ENCOUNTER FOR THERAPEUTIC DRUG MONITORING: ICD-10-CM

## 2020-04-03 DIAGNOSIS — M07.60 ENTEROPATHIC ARTHRITIS: ICD-10-CM

## 2020-04-03 DIAGNOSIS — K51.919 ULCERATIVE COLITIS WITH COMPLICATION, UNSPECIFIED LOCATION (HCC): ICD-10-CM

## 2020-04-03 DIAGNOSIS — R53.83 OTHER FATIGUE: ICD-10-CM

## 2020-06-19 ENCOUNTER — OFFICE VISIT (OUTPATIENT)
Dept: ENDOCRINOLOGY | Facility: CLINIC | Age: 40
End: 2020-06-19

## 2020-06-19 ENCOUNTER — LAB (OUTPATIENT)
Dept: LAB | Facility: HOSPITAL | Age: 40
End: 2020-06-19

## 2020-06-19 ENCOUNTER — TELEPHONE (OUTPATIENT)
Dept: ENDOCRINOLOGY | Facility: CLINIC | Age: 40
End: 2020-06-19

## 2020-06-19 VITALS
HEART RATE: 90 BPM | OXYGEN SATURATION: 98 % | DIASTOLIC BLOOD PRESSURE: 72 MMHG | BODY MASS INDEX: 26.25 KG/M2 | WEIGHT: 177.2 LBS | SYSTOLIC BLOOD PRESSURE: 128 MMHG | HEIGHT: 69 IN

## 2020-06-19 DIAGNOSIS — E11.65 TYPE 2 DIABETES MELLITUS WITH HYPERGLYCEMIA, UNSPECIFIED WHETHER LONG TERM INSULIN USE (HCC): Primary | ICD-10-CM

## 2020-06-19 PROBLEM — IMO0002 DIABETES MELLITUS TYPE 2, UNCONTROLLED, WITH COMPLICATIONS: Status: ACTIVE | Noted: 2020-06-19

## 2020-06-19 LAB
ANION GAP SERPL CALCULATED.3IONS-SCNC: 12.6 MMOL/L (ref 5–15)
BUN BLD-MCNC: 13 MG/DL (ref 6–20)
BUN/CREAT SERPL: 13.3 (ref 7–25)
CALCIUM SPEC-SCNC: 9.3 MG/DL (ref 8.6–10.5)
CHLORIDE SERPL-SCNC: 95 MMOL/L (ref 98–107)
CO2 SERPL-SCNC: 24.4 MMOL/L (ref 22–29)
CREAT BLD-MCNC: 0.98 MG/DL (ref 0.76–1.27)
GFR SERPL CREATININE-BSD FRML MDRD: 84 ML/MIN/1.73
GLUCOSE BLD-MCNC: 317 MG/DL (ref 65–99)
GLUCOSE BLDC GLUCOMTR-MCNC: 407 MG/DL (ref 70–130)
HBA1C MFR BLD: 6.7 %
POTASSIUM BLD-SCNC: 3.7 MMOL/L (ref 3.5–5.2)
SODIUM BLD-SCNC: 132 MMOL/L (ref 136–145)

## 2020-06-19 PROCEDURE — 84681 ASSAY OF C-PEPTIDE: CPT | Performed by: INTERNAL MEDICINE

## 2020-06-19 PROCEDURE — 82947 ASSAY GLUCOSE BLOOD QUANT: CPT | Performed by: INTERNAL MEDICINE

## 2020-06-19 PROCEDURE — 80048 BASIC METABOLIC PNL TOTAL CA: CPT | Performed by: INTERNAL MEDICINE

## 2020-06-19 PROCEDURE — 99204 OFFICE O/P NEW MOD 45 MIN: CPT | Performed by: INTERNAL MEDICINE

## 2020-06-19 PROCEDURE — 83036 HEMOGLOBIN GLYCOSYLATED A1C: CPT | Performed by: INTERNAL MEDICINE

## 2020-06-19 RX ORDER — BUPROPION HYDROCHLORIDE 300 MG/1
300 TABLET ORAL DAILY
COMMUNITY
End: 2021-02-05 | Stop reason: SDUPTHER

## 2020-06-19 RX ORDER — METFORMIN HYDROCHLORIDE 500 MG/1
1 TABLET, EXTENDED RELEASE ORAL DAILY
COMMUNITY
Start: 2020-02-01 | End: 2020-06-22

## 2020-06-19 RX ORDER — OXYCODONE AND ACETAMINOPHEN 10; 325 MG/1; MG/1
1 TABLET ORAL AS NEEDED
COMMUNITY
End: 2020-08-31

## 2020-06-19 RX ORDER — INSULIN ASPART INJECTION 100 [IU]/ML
INJECTION, SOLUTION SUBCUTANEOUS
Start: 2020-06-19 | End: 2020-06-22

## 2020-06-19 NOTE — TELEPHONE ENCOUNTER
[facility]: Karina Dykes APRN  Requested recent labs from the office of Karina Dykes.    Phone:  [phone]

## 2020-06-19 NOTE — TELEPHONE ENCOUNTER
Requested records for recent ER visit.  [facility]    [phone]    Medical Records, rep will fax us the above records.

## 2020-06-19 NOTE — PROGRESS NOTES
Chief Complaint   Patient presents with   • Establish Care   • Diabetes        New patient who is being seen in consultation regarding diabetes at the request of Referring, Self     HPI   Eric Ramirez is a 39 y.o. male who presents for evaluation of possible diabetes.    Patient has a history of type unknown type of diabetes. He reports that he was never formally given this diagnosis but has had some concerns about glucose elevation at home. Patient was initially started on metformin 500 XR once daily by his PCP in February.  This regimen has not been changed.  Patient believes that glycemic control is erratic.  He has not noted any effect from metformin.  Patient reports most recent A1c was 7.8. Patient reports good adherence to medications.  He denies any GI side effects related to metformin.    Patient denies any history of hypoglycemia or symptoms concerning for hypoglycemia.  Patient monitors blood glucose 3-4 times per day.Fasting 100-110, up to 270 following meals.  No glucose log or glucometer available for review today.  Patient is working to restrict carbohydrates.  Patient does exercise regularly.     Patient has never had a dilated eye exam  Patient denies acute visual changes.  Patient denies foot related concerns such as numbness, tingling, or pain.  History of dyslipidemia: No  History of renal dysfunction: No    Patient seen in the ER yesterday due to acute pain.  He does report that he received steroids in the ER.  Patient has a strong family history of autoimmune disease.    Patient denies any increased thirst or increased urination.  He does report weight loss and states he has made significant changes to his diet over the past few months.  He denies current any nausea, vomiting, lightheadedness.  He generally feels well apart from ongoing joint pain.    Past Medical History:   Diagnosis Date   • Acute pharyngitis    • Bronchitis    • Chest wall injury    • Gallbladder disease    • Gastritis     • Generalized anxiety disorder    • Internal hemorrhoids    • Labyrinthitis    • Palpitations    • Peptic ulcer    • Pre-diabetes    • Rectal hemorrhage    • Sinusitis    • Ulcerative colitis (CMS/HCC)    • URI (upper respiratory infection)      Past Surgical History:   Procedure Laterality Date   • CHOLECYSTECTOMY     • COLONOSCOPY  07/28/16By Dr. Luci Nelson      Family History   Problem Relation Age of Onset   • Hypertension Mother    • Melanoma Father    • Heart disease Father    • Scleroderma Sister    • Pulmonary fibrosis Sister    • Pyloric stenosis Sister    • Colon polyps Neg Hx    • Colon cancer Neg Hx       Social History     Socioeconomic History   • Marital status:      Spouse name: Not on file   • Number of children: Not on file   • Years of education: Not on file   • Highest education level: Not on file   Tobacco Use   • Smoking status: Former Smoker     Packs/day: 1.00     Years: 15.00     Pack years: 15.00     Types: Cigarettes   • Smokeless tobacco: Never Used   Substance and Sexual Activity   • Alcohol use: Yes     Alcohol/week: 2.0 - 3.0 standard drinks     Types: 2 - 3 Cans of beer per week     Comment: social     • Drug use: No   • Sexual activity: Yes     Partners: Female      Allergies   Allergen Reactions   • Levofloxacin Itching     Throat itching, eyes watering   • Buspirone Itching   • Flagyl [Metronidazole] Hives and Swelling      Current Outpatient Medications on File Prior to Visit   Medication Sig Dispense Refill   • buPROPion XL (WELLBUTRIN XL) 300 MG 24 hr tablet Take 300 mg by mouth Daily.     • Cetirizine HCl (ZYRTEC ALLERGY PO) Take 1 tablet by mouth daily.     • FOLIC ACID PO Take 1 tablet by mouth Daily.     • metFORMIN ER (GLUCOPHAGE-XR) 500 MG 24 hr tablet Take 1 tablet by mouth Daily.     • omeprazole (priLOSEC) 20 MG capsule Take 1 capsule by mouth Daily. 90 capsule 1   • oxyCODONE-acetaminophen (PERCOCET)  MG per tablet Take 1 tablet by mouth As Needed for  "Moderate Pain .     • Probiotic Product (PROBIOTIC DAILY PO) Take 1 tablet by mouth Daily.     • sulfaSALAzine (AZULFIDINE) 500 MG tablet Take 500 mg by mouth 4 (Four) Times a Day.     • [DISCONTINUED] buPROPion XL (WELLBUTRIN XL) 150 MG 24 hr tablet Take 1 tablet by mouth Every Morning. 90 tablet 1     No current facility-administered medications on file prior to visit.         Review of Systems   Constitutional: Positive for unexpected weight loss. Negative for fatigue.   HENT: Negative for voice change.    Eyes: Negative for blurred vision and visual disturbance.   Respiratory: Negative for cough and shortness of breath.    Cardiovascular: Negative for chest pain and palpitations.   Gastrointestinal: Negative for constipation and diarrhea.   Endocrine: Negative for polydipsia, polyphagia and polyuria.   Musculoskeletal: Positive for arthralgias and myalgias.   Skin: Negative for pallor.   Neurological: Negative for dizziness, tremors, seizures, speech difficulty, weakness and confusion.   Psychiatric/Behavioral: Positive for sleep disturbance. The patient is nervous/anxious.        Vitals:    06/19/20 1118   BP: 128/72   Pulse: 90   SpO2: 98%   Weight: 80.4 kg (177 lb 3.2 oz)   Height: 175.3 cm (69\")   Body mass index is 26.17 kg/m².     Physical Exam   Constitutional: Vital signs are normal. He appears well-developed and well-nourished. He is cooperative.  Non-toxic appearance. He does not appear ill.   HENT:   Head: Normocephalic and atraumatic.   Right Ear: Hearing normal.   Left Ear: Hearing normal.   Nose: Nose normal.   Eyes: Pupils are equal, round, and reactive to light. Conjunctivae and EOM are normal.   Neck: Trachea normal. No thyromegaly present.   Cardiovascular: Normal rate, regular rhythm and normal pulses.   No murmur heard.  Pulmonary/Chest: Effort normal and breath sounds normal. No respiratory distress.   Abdominal: Soft. Bowel sounds are normal. He exhibits no distension. There is no " hepatosplenomegaly. There is no tenderness.   Lymphadenopathy:        Head (right side): No submandibular adenopathy present.        Head (left side): No submandibular adenopathy present.     He has no cervical adenopathy.   Neurological: He is alert. He has normal strength and normal reflexes.   Skin: Skin is warm, dry and intact. No rash noted.   Psychiatric: He has a normal mood and affect. His behavior is normal.   Vitals reviewed.         Labs/Imaging  Results for YOLANDA MARCUM (MRN [id number]) as of 6/19/2020 11:23   Ref. Range 3/19/2020 14:50   Glucose Latest Ref Range: 65 - 99 mg/dL 273 (H)   Sodium Latest Ref Range: 136 - 145 mmol/L 132 (L)   Potassium Latest Ref Range: 3.5 - 5.2 mmol/L 4.4   CO2 Latest Ref Range: 22.0 - 29.0 mmol/L 25.2   Chloride Latest Ref Range: 98 - 107 mmol/L 96 (L)   Anion Gap Latest Ref Range: 5.0 - 15.0 mmol/L 10.8   Creatinine Latest Ref Range: 0.76 - 1.27 mg/dL 1.04   BUN Latest Ref Range: 6 - 20 mg/dL 8   BUN/Creatinine Ratio Latest Ref Range: 7.0 - 25.0  7.7   Calcium Latest Ref Range: 8.6 - 10.5 mg/dL 9.9   eGFR Non African Am Latest Ref Range: >60 mL/min/1.73 79   Alkaline Phosphatase Latest Ref Range: 39 - 117 U/L 92   Total Protein Latest Ref Range: 6.0 - 8.5 g/dL 7.5   ALT (SGPT) Latest Ref Range: 1 - 41 U/L 33   AST (SGOT) Latest Ref Range: 1 - 40 U/L 19   Total Bilirubin Latest Ref Range: 0.2 - 1.2 mg/dL 0.9   Albumin Latest Ref Range: 3.50 - 5.20 g/dL 5.10   Globulin Latest Units: gm/dL 2.4   A/G Ratio Latest Units: g/dL 2.1       Labs dated 3/18/2020  GFR 88  Hemoglobin A1c 7.8    Triglycerides 112    Assessment and Plan    Yolanda was seen today for establish care and diabetes.    Diagnoses and all orders for this visit:    Diabetes mellitus type 2, controlled, with complications (CMS/HCC)  -Well-controlled with A1c 6.7%, currently taking metformin 500 mg sustained-release daily  -Patient has a lean phenotype and blood glucose elevation which is out of  proportion to his A1c.  He did receive steroids yesterday (ER not states he was given 10 mg of Decadron.) Which is likely confounding this evaluation.  However, he reports some blood sugars on the 400s despite dietary changes and exercising before he was placed on steroids.  -We will plan to obtain CMP and C-peptide to evaluate endogenous insulin production.  -Until results of the above labs are available discussed starting low-dose insulin. Patient to start Lantus 5 units daily, patient given correctional scale with short acting insulin 1 unit for every 100 points greater than 200.  Discussed that he can utilize this every 6 hours, as needed.  Discussed that if he point he develops nausea, vomiting, abdominal pain or if his blood sugar fails to respond to treatment with insulin he needs to seek treatment in the ER due to concern for DKA.  Discussed risk of hypoglycemia with initiation of insulin therapy.  Discussed symptoms of hypoglycemia along with management of hypoglycemia and when to seek care.  - patient to continue metformin for now  -Patient was advised to monitor blood sugar 4 times daily.  Patient was instructed to bring glucometer to all future appointments. Patient should contact the clinic between appointments with hypoglycemia or persistent hyperglycemia.  Discussed signs and symptoms of hypoglycemia as well as hypoglycemia management via the role of 15's.  Discussed potential for long-term complications with uncontrolled diabetes including nephropathy, neuropathy, retinopathy, increased risk for cardiac disease.  Discussed the role of diet and exercise in the management of diabetes.    -     POCT Glucose  -     POC Glycosylated Hemoglobin (Hb A1C)  -     C-Peptide  -     Basic Metabolic Panel    EGFR normal in March 2020     Will determine follow up interval once labs result.    The patient was instructed to contact the clinic with any interval questions or concerns.    Jil Ledesma MD     Please  note that portions of this document were completed using a voice recognition program. Efforts were made to edit the dictations, but occasionally words are mis-transcribed.

## 2020-06-22 LAB — C PEPTIDE SERPL-MCNC: 3.2 NG/ML (ref 1.1–4.4)

## 2020-06-22 RX ORDER — METFORMIN HYDROCHLORIDE 500 MG/1
2000 TABLET, EXTENDED RELEASE ORAL
Qty: 120 TABLET | Refills: 3 | Status: SHIPPED | OUTPATIENT
Start: 2020-06-22 | End: 2020-08-12

## 2020-06-22 NOTE — TELEPHONE ENCOUNTER
Received ER visit records from [facility] for patient.  Forward records to Dr. Ledesma for review.

## 2020-06-23 ENCOUNTER — TELEPHONE (OUTPATIENT)
Dept: ENDOCRINOLOGY | Facility: CLINIC | Age: 40
End: 2020-06-23

## 2020-06-24 ENCOUNTER — TELEPHONE (OUTPATIENT)
Dept: ENDOCRINOLOGY | Facility: CLINIC | Age: 40
End: 2020-06-24

## 2020-06-24 NOTE — TELEPHONE ENCOUNTER
Informed patient of lab results, pt voiced understanding.    Patient scheduled f/u appointment for 09/21/20 at 1:45 pm.

## 2020-06-24 NOTE — TELEPHONE ENCOUNTER
----- Message from Jil Ledesma MD sent at 6/22/2020  1:20 PM EDT -----  See [employer]

## 2020-07-15 ENCOUNTER — TRANSCRIBE ORDERS (OUTPATIENT)
Dept: LAB | Facility: HOSPITAL | Age: 40
End: 2020-07-15

## 2020-07-15 ENCOUNTER — LAB (OUTPATIENT)
Dept: LAB | Facility: HOSPITAL | Age: 40
End: 2020-07-15

## 2020-07-15 DIAGNOSIS — R53.83 OTHER FATIGUE: ICD-10-CM

## 2020-07-15 DIAGNOSIS — M07.60 ENTEROPATHIC ARTHRITIS: ICD-10-CM

## 2020-07-15 DIAGNOSIS — K51.919 MILD CHRONIC ULCERATIVE COLITIS WITH COMPLICATION (HCC): ICD-10-CM

## 2020-07-15 DIAGNOSIS — K51.919 MILD CHRONIC ULCERATIVE COLITIS WITH COMPLICATION (HCC): Primary | ICD-10-CM

## 2020-07-15 LAB
BASOPHILS # BLD AUTO: 0.08 10*3/MM3 (ref 0–0.2)
BASOPHILS NFR BLD AUTO: 0.8 % (ref 0–1.5)
CRP SERPL-MCNC: 0.84 MG/DL (ref 0–0.5)
DEPRECATED RDW RBC AUTO: 38.9 FL (ref 37–54)
EOSINOPHIL # BLD AUTO: 0.06 10*3/MM3 (ref 0–0.4)
EOSINOPHIL NFR BLD AUTO: 0.6 % (ref 0.3–6.2)
ERYTHROCYTE [DISTWIDTH] IN BLOOD BY AUTOMATED COUNT: 11.4 % (ref 12.3–15.4)
HAV IGM SERPL QL IA: NORMAL
HBV CORE IGM SERPL QL IA: NORMAL
HBV SURFACE AG SERPL QL IA: NORMAL
HCT VFR BLD AUTO: 44.6 % (ref 37.5–51)
HCV AB SER DONR QL: NORMAL
HGB BLD-MCNC: 15.3 G/DL (ref 13–17.7)
IMM GRANULOCYTES # BLD AUTO: 0.04 10*3/MM3 (ref 0–0.05)
IMM GRANULOCYTES NFR BLD AUTO: 0.4 % (ref 0–0.5)
LYMPHOCYTES # BLD AUTO: 0.79 10*3/MM3 (ref 0.7–3.1)
LYMPHOCYTES NFR BLD AUTO: 7.4 % (ref 19.6–45.3)
MCH RBC QN AUTO: 31.7 PG (ref 26.6–33)
MCHC RBC AUTO-ENTMCNC: 34.3 G/DL (ref 31.5–35.7)
MCV RBC AUTO: 92.3 FL (ref 79–97)
MONOCYTES # BLD AUTO: 0.6 10*3/MM3 (ref 0.1–0.9)
MONOCYTES NFR BLD AUTO: 5.7 % (ref 5–12)
NEUTROPHILS NFR BLD AUTO: 85.1 % (ref 42.7–76)
NEUTROPHILS NFR BLD AUTO: 9.04 10*3/MM3 (ref 1.7–7)
NRBC BLD AUTO-RTO: 0 /100 WBC (ref 0–0.2)
PLATELET # BLD AUTO: 268 10*3/MM3 (ref 140–450)
PMV BLD AUTO: 11.2 FL (ref 6–12)
RBC # BLD AUTO: 4.83 10*6/MM3 (ref 4.14–5.8)
WBC # BLD AUTO: 10.61 10*3/MM3 (ref 3.4–10.8)

## 2020-07-15 PROCEDURE — 85652 RBC SED RATE AUTOMATED: CPT

## 2020-07-15 PROCEDURE — 36415 COLL VENOUS BLD VENIPUNCTURE: CPT

## 2020-07-15 PROCEDURE — 80074 ACUTE HEPATITIS PANEL: CPT

## 2020-07-15 PROCEDURE — 85025 COMPLETE CBC W/AUTO DIFF WBC: CPT

## 2020-07-15 PROCEDURE — 86140 C-REACTIVE PROTEIN: CPT

## 2020-07-15 PROCEDURE — 86480 TB TEST CELL IMMUN MEASURE: CPT

## 2020-07-16 LAB
ERYTHROCYTE [SEDIMENTATION RATE] IN BLOOD: 2 MM/HR (ref 0–15)
HOLD SPECIMEN: NORMAL

## 2020-07-18 LAB
QUANTIFERON CRITERIA: NORMAL
QUANTIFERON MITOGEN VALUE: 7.92 IU/ML
QUANTIFERON NIL VALUE: 0.01 IU/ML
QUANTIFERON TB1 AG VALUE: 0.01 IU/ML
QUANTIFERON TB2 AG VALUE: 0.01 IU/ML
QUANTIFERON-TB GOLD PLUS: NEGATIVE

## 2020-07-29 ENCOUNTER — TELEPHONE (OUTPATIENT)
Dept: ENDOCRINOLOGY | Facility: CLINIC | Age: 40
End: 2020-07-29

## 2020-07-29 NOTE — TELEPHONE ENCOUNTER
Patient states he had no symptoms at 500 mg dose.  Patient schedule f/u appointment for 08/12/20 at 9:45 am.

## 2020-07-29 NOTE — TELEPHONE ENCOUNTER
"----- Message from Jil Ledesma MD sent at 7/29/2020  9:24 AM EDT -----  Regarding: FW: Prescription Question  Contact: [phone]      ----- Message -----  From: Meghann Nicolas MA  Sent: 7/29/2020   9:15 AM EDT  To: Jil Ledesma MD  Subject: FW: Prescription Question                            ----- Message -----  From: Eric Ramirez  Sent: 7/28/2020   8:08 PM EDT  To: [facility]  Subject: RE: Prescription Question                        I am not sure if I can deal with this for 3 more weeks. I have already lost approximately 10lbs.  On the nights I have to work I cannot eat, and the days I do not, the food runs right through me.  I hope this is not the only option.      ----- Message -----  From: YUMIKO CEVALLOS  Sent: 7/28/20, 14:24  To: Eric Ramirez  Subject: RE: Prescription Question    Hi Dr. Callie Reyes recommendations are as follows:           \"Please advise patient to titrate back down by 500 mg per week until no longer having symptoms. Once he is at the maximally tolerated dose, he should sent us a week of data and   we can determine if another agent is needed.\"          If you have any further questions, please let me know.      Thank you,    JALYN Robertson          ----- Message -----     From: Eric Ramirez     Sent: 7/28/2020 10:22 AM EDT       To: Jil Ledesma MD  Subject: Prescription Question    Dr. Ledesma,  It seems that after raising my dosage of Metformin I am having a side effect.  I have had pretty severe diarrhea for more then 2 weeks. I was hopeful my body would get use to it, but that doesn't seem to be happening.  I hope there is another option for me!     Thanks,  Emily Ramirez  "

## 2020-08-03 PROCEDURE — U0003 INFECTIOUS AGENT DETECTION BY NUCLEIC ACID (DNA OR RNA); SEVERE ACUTE RESPIRATORY SYNDROME CORONAVIRUS 2 (SARS-COV-2) (CORONAVIRUS DISEASE [COVID-19]), AMPLIFIED PROBE TECHNIQUE, MAKING USE OF HIGH THROUGHPUT TECHNOLOGIES AS DESCRIBED BY CMS-2020-01-R: HCPCS | Performed by: FAMILY MEDICINE

## 2020-08-05 ENCOUNTER — TELEPHONE (OUTPATIENT)
Dept: URGENT CARE | Facility: CLINIC | Age: 40
End: 2020-08-05

## 2020-08-12 ENCOUNTER — OFFICE VISIT (OUTPATIENT)
Dept: ENDOCRINOLOGY | Facility: CLINIC | Age: 40
End: 2020-08-12

## 2020-08-12 VITALS
DIASTOLIC BLOOD PRESSURE: 78 MMHG | SYSTOLIC BLOOD PRESSURE: 118 MMHG | HEIGHT: 69 IN | OXYGEN SATURATION: 98 % | BODY MASS INDEX: 25.51 KG/M2 | WEIGHT: 172.2 LBS | HEART RATE: 85 BPM

## 2020-08-12 DIAGNOSIS — E11.65 TYPE 2 DIABETES MELLITUS WITH HYPERGLYCEMIA, UNSPECIFIED WHETHER LONG TERM INSULIN USE (HCC): Primary | ICD-10-CM

## 2020-08-12 LAB
EXPIRATION DATE: ABNORMAL
GLUCOSE BLDC GLUCOMTR-MCNC: 250 MG/DL (ref 70–130)
Lab: ABNORMAL

## 2020-08-12 PROCEDURE — 82947 ASSAY GLUCOSE BLOOD QUANT: CPT | Performed by: INTERNAL MEDICINE

## 2020-08-12 PROCEDURE — 99213 OFFICE O/P EST LOW 20 MIN: CPT | Performed by: INTERNAL MEDICINE

## 2020-08-12 NOTE — PROGRESS NOTES
"Chief Complaint   Patient presents with   • Follow-up   • Diabetes        HPI   Eric Ramirez is a 39 y.o. male had concerns including Follow-up and Diabetes.      Patient transitioned back to metformin after C-peptide was noted to be detectable in the interval since last visit.  He does report significant GI upset with frequent diarrhea after upper titration of metformin.  He believes the symptoms started with increased to 1000 mg dose.  He contact the office and was given instructions to downward titrate and is currently taking 500 mg daily and does report some continued symptoms at this dose.  He reports some interval weight loss which he attributes to diarrhea and GI upset.  He reports he is checking blood sugar twice daily, no glucometer or glucose log available for review today.  He reports values most commonly in the 200s.  He denies increased thirst or increased urination.    The following portions of the patient's history were reviewed and updated as appropriate: allergies, current medications and past social history.    Review of Systems   Constitutional: Positive for unexpected weight loss.   Respiratory: Negative for cough and shortness of breath.    Cardiovascular: Negative for chest pain and palpitations.   Gastrointestinal: Positive for diarrhea.        /78   Pulse 85   Ht 175.3 cm (69\")   Wt 78.1 kg (172 lb 3.2 oz)   SpO2 98%   BMI 25.43 kg/m²      Physical Exam      Constitutional:  well developed; well nourished  no acute distress  appears stated age   ENT/Thyroid: not examined   Eyes: Conjunctiva: clear   Respiratory:  breathing is unlabored  clear to auscultation bilaterally   Cardiovascular:  regular rate and rhythm   Chest:  Not performed.   Abdomen: soft, non-tender; no masses   : Not performed.   Musculoskeletal: Not performed   Skin: dry and warm   Neuro: normal without focal findings and mental status, speech normal   Psych: mood and affect are within normal limits "       Labs/Imaging  Results for YOLANDA MARCUM (MRN [id number]) as of 8/12/2020 12:26   Ref. Range 6/19/2020 11:32 6/19/2020 12:31 7/15/2020 13:02 8/3/2020 19:22 8/12/2020 10:12   Glucose Latest Ref Range: 70 - 130 mg/dL 407 (A) 317 (H)   250 (A)   Sodium Latest Ref Range: 136 - 145 mmol/L  132 (L)      Potassium Latest Ref Range: 3.5 - 5.2 mmol/L  3.7      CO2 Latest Ref Range: 22.0 - 29.0 mmol/L  24.4      Chloride Latest Ref Range: 98 - 107 mmol/L  95 (L)      Anion Gap Latest Ref Range: 5.0 - 15.0 mmol/L  12.6      Creatinine Latest Ref Range: 0.76 - 1.27 mg/dL  0.98      BUN Latest Ref Range: 6 - 20 mg/dL  13      BUN/Creatinine Ratio Latest Ref Range: 7.0 - 25.0   13.3      Calcium Latest Ref Range: 8.6 - 10.5 mg/dL  9.3      eGFR Non  Am Latest Ref Range: >60 mL/min/1.73  84      Hemoglobin A1C Latest Units: % 6.7       C-Peptide Latest Ref Range: 1.1 - 4.4 ng/mL  3.2          Yolanda was seen today for follow-up and diabetes.    Diagnoses and all orders for this visit:    Type 2 diabetes mellitus with hyperglycemia, unspecified whether long term insulin use (CMS/[facility])   -Most recent hemoglobin A1c 6.7%, too soon to repeat today  -Patient with frequent diarrhea after up titration of metformin, patient also has a history of ulcerative colitis.  -Given GI upset, will plan to discontinue metformin  -Discussed potential options and the possibility of insulin deficiency.  Given insulin level lower than suspected with concurrent hyperglycemia on prior testing and desire to avoid any GI side effects we will plan to resume long-acting insulin at bedtime.  Patient previously tolerated this without incident.  Patient given Tresiba in office today.  Discussed that insulin may change based on formulary coverage.  Patient to start 5 units daily.  If glucose remains elevated after 2 weeks he will increase this to 10 units daily   -Patient was advised to monitor blood sugar 2 times daily.  Patient was instructed to  bring glucometer to all future appointments. Patient should contact the clinic between appointments with hypoglycemia or persistent hyperglycemia.  Discussed signs and symptoms of hypoglycemia as well as hypoglycemia management via the role of 15's.  Discussed potential for long-term complications with uncontrolled diabetes including nephropathy, neuropathy, retinopathy, increased risk for cardiac disease.  Discussed the role of diet and exercise in the management of diabetes.  -Plan to update screening labs at next visit, also consider sending antibodies to evaluate for underlying cause of insulin deficiency.    -     POCT Glucose         Return in about 2 months (around 10/12/2020) for Next scheduled follow up. The patient was instructed to contact the clinic with any interval questions or concerns.    Jil Ledesma MD   Endocrinologist    Please note that portions of this document were completed with a voice recognition program. Efforts were made to edit the dictations, but occasionally words are mis-transcribed.

## 2020-08-28 ENCOUNTER — PATIENT MESSAGE (OUTPATIENT)
Dept: ENDOCRINOLOGY | Facility: CLINIC | Age: 40
End: 2020-08-28

## 2020-08-31 ENCOUNTER — OFFICE VISIT (OUTPATIENT)
Dept: INTERNAL MEDICINE | Facility: CLINIC | Age: 40
End: 2020-08-31

## 2020-08-31 VITALS
HEIGHT: 69 IN | BODY MASS INDEX: 25.21 KG/M2 | OXYGEN SATURATION: 92 % | WEIGHT: 170.2 LBS | TEMPERATURE: 98.2 F | SYSTOLIC BLOOD PRESSURE: 132 MMHG | RESPIRATION RATE: 16 BRPM | DIASTOLIC BLOOD PRESSURE: 90 MMHG | HEART RATE: 81 BPM

## 2020-08-31 DIAGNOSIS — M79.89 SWELLING OF BOTH LOWER EXTREMITIES: ICD-10-CM

## 2020-08-31 DIAGNOSIS — S22.20XD CLOSED FRACTURE OF STERNUM WITH RETROSTERNAL CONTUSION WITH ROUTINE HEALING, SUBSEQUENT ENCOUNTER: Primary | ICD-10-CM

## 2020-08-31 DIAGNOSIS — R00.2 HEART PALPITATIONS: ICD-10-CM

## 2020-08-31 DIAGNOSIS — D64.9 ANEMIA, UNSPECIFIED TYPE: ICD-10-CM

## 2020-08-31 DIAGNOSIS — R79.89 ELEVATED LFTS: Primary | ICD-10-CM

## 2020-08-31 DIAGNOSIS — R91.1 NODULE OF UPPER LOBE OF RIGHT LUNG: ICD-10-CM

## 2020-08-31 DIAGNOSIS — K51.019 ULCERATIVE PANCOLITIS WITH COMPLICATION (HCC): ICD-10-CM

## 2020-08-31 DIAGNOSIS — I49.3 FREQUENT PVCS: ICD-10-CM
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
## 2020-08-31 DIAGNOSIS — S22.20XD CLOSED FRACTURE OF STERNUM WITH RETROSTERNAL CONTUSION WITH ROUTINE HEALING, SUBSEQUENT ENCOUNTER: ICD-10-CM

## 2020-08-31 PROBLEM — F41.9 ANXIETY: Status: ACTIVE | Noted: 2020-08-31

## 2020-08-31 PROBLEM — K63.9 INFLAMMATORY BOWEL ARTHRITIS: Status: ACTIVE | Noted: 2020-06-18

## 2020-08-31 PROBLEM — K21.9 GASTROESOPHAGEAL REFLUX DISEASE WITHOUT ESOPHAGITIS: Status: ACTIVE | Noted: 2019-06-21

## 2020-08-31 PROBLEM — F41.9 ANXIETY: Status: RESOLVED | Noted: 2020-08-31 | Resolved: 2020-08-31

## 2020-08-31 PROBLEM — M07.60 INFLAMMATORY BOWEL ARTHRITIS: Status: ACTIVE | Noted: 2020-06-18

## 2020-08-31 PROBLEM — K85.90 PANCREATITIS: Status: ACTIVE | Noted: 2020-08-31

## 2020-08-31 LAB
ALBUMIN SERPL-MCNC: 4 G/DL (ref 3.5–5.2)
ALBUMIN/GLOB SERPL: 1.3 G/DL
ALP SERPL-CCNC: 88 U/L (ref 39–117)
ALT SERPL W P-5'-P-CCNC: 26 U/L (ref 1–41)
ANION GAP SERPL CALCULATED.3IONS-SCNC: 8.9 MMOL/L (ref 5–15)
AST SERPL-CCNC: 14 U/L (ref 1–40)
BASOPHILS # BLD AUTO: 0.1 10*3/MM3 (ref 0–0.2)
BASOPHILS NFR BLD AUTO: 1.1 % (ref 0–1.5)
BILIRUB SERPL-MCNC: 0.6 MG/DL (ref 0–1.2)
BUN SERPL-MCNC: 12 MG/DL (ref 6–20)
BUN/CREAT SERPL: 13.8 (ref 7–25)
CALCIUM SPEC-SCNC: 9.6 MG/DL (ref 8.6–10.5)
CHLORIDE SERPL-SCNC: 97 MMOL/L (ref 98–107)
CO2 SERPL-SCNC: 28.1 MMOL/L (ref 22–29)
CREAT SERPL-MCNC: 0.87 MG/DL (ref 0.76–1.27)
DEPRECATED RDW RBC AUTO: 38.6 FL (ref 37–54)
EOSINOPHIL # BLD AUTO: 0.48 10*3/MM3 (ref 0–0.4)
EOSINOPHIL NFR BLD AUTO: 5.2 % (ref 0.3–6.2)
ERYTHROCYTE [DISTWIDTH] IN BLOOD BY AUTOMATED COUNT: 11.9 % (ref 12.3–15.4)
FERRITIN SERPL-MCNC: 150 NG/ML (ref 30–400)
GFR SERPL CREATININE-BSD FRML MDRD: 97 ML/MIN/1.73
GLOBULIN UR ELPH-MCNC: 3.1 GM/DL
GLUCOSE SERPL-MCNC: 213 MG/DL (ref 65–99)
HCT VFR BLD AUTO: 44.7 % (ref 37.5–51)
HGB BLD-MCNC: 15.5 G/DL (ref 13–17.7)
IMM GRANULOCYTES # BLD AUTO: 0.03 10*3/MM3 (ref 0–0.05)
IMM GRANULOCYTES NFR BLD AUTO: 0.3 % (ref 0–0.5)
IRON 24H UR-MRATE: 96 MCG/DL (ref 59–158)
IRON SATN MFR SERPL: 20 % (ref 20–50)
LYMPHOCYTES # BLD AUTO: 0.91 10*3/MM3 (ref 0.7–3.1)
LYMPHOCYTES NFR BLD AUTO: 9.9 % (ref 19.6–45.3)
MCH RBC QN AUTO: 30.7 PG (ref 26.6–33)
MCHC RBC AUTO-ENTMCNC: 34.7 G/DL (ref 31.5–35.7)
MCV RBC AUTO: 88.5 FL (ref 79–97)
MONOCYTES # BLD AUTO: 0.61 10*3/MM3 (ref 0.1–0.9)
MONOCYTES NFR BLD AUTO: 6.7 % (ref 5–12)
NEUTROPHILS NFR BLD AUTO: 7.04 10*3/MM3 (ref 1.7–7)
NEUTROPHILS NFR BLD AUTO: 76.8 % (ref 42.7–76)
NRBC BLD AUTO-RTO: 0 /100 WBC (ref 0–0.2)
PLATELET # BLD AUTO: 304 10*3/MM3 (ref 140–450)
PMV BLD AUTO: 10.5 FL (ref 6–12)
POTASSIUM SERPL-SCNC: 4.8 MMOL/L (ref 3.5–5.2)
PROT SERPL-MCNC: 7.1 G/DL (ref 6–8.5)
RBC # BLD AUTO: 5.05 10*6/MM3 (ref 4.14–5.8)
SODIUM SERPL-SCNC: 134 MMOL/L (ref 136–145)
TIBC SERPL-MCNC: 492 MCG/DL (ref 298–536)
TRANSFERRIN SERPL-MCNC: 330 MG/DL (ref 200–360)
WBC # BLD AUTO: 9.17 10*3/MM3 (ref 3.4–10.8)

## 2020-08-31 PROCEDURE — 84466 ASSAY OF TRANSFERRIN: CPT | Performed by: PHYSICIAN ASSISTANT

## 2020-08-31 PROCEDURE — 82728 ASSAY OF FERRITIN: CPT | Performed by: PHYSICIAN ASSISTANT

## 2020-08-31 PROCEDURE — 80053 COMPREHEN METABOLIC PANEL: CPT | Performed by: PHYSICIAN ASSISTANT

## 2020-08-31 PROCEDURE — 99214 OFFICE O/P EST MOD 30 MIN: CPT | Performed by: PHYSICIAN ASSISTANT

## 2020-08-31 PROCEDURE — 36415 COLL VENOUS BLD VENIPUNCTURE: CPT | Performed by: PHYSICIAN ASSISTANT

## 2020-08-31 PROCEDURE — 99213 OFFICE O/P EST LOW 20 MIN: CPT | Performed by: PHYSICIAN ASSISTANT

## 2020-08-31 PROCEDURE — 85025 COMPLETE CBC W/AUTO DIFF WBC: CPT | Performed by: PHYSICIAN ASSISTANT

## 2020-08-31 PROCEDURE — 83540 ASSAY OF IRON: CPT | Performed by: PHYSICIAN ASSISTANT

## 2020-08-31 RX ORDER — GABAPENTIN 300 MG/1
300 CAPSULE ORAL 3 TIMES DAILY
Qty: 90 CAPSULE | Refills: 0 | Status: SHIPPED | OUTPATIENT
Start: 2020-08-31 | End: 2020-09-28

## 2020-08-31 RX ORDER — METHOCARBAMOL 500 MG/1
1 TABLET, FILM COATED ORAL EVERY 8 HOURS
COMMUNITY
Start: 2020-08-24 | End: 2020-08-31 | Stop reason: SDUPTHER

## 2020-08-31 RX ORDER — METHOCARBAMOL 500 MG/1
500 TABLET, FILM COATED ORAL 3 TIMES DAILY
Qty: 90 TABLET | Refills: 0 | Status: SHIPPED | OUTPATIENT
Start: 2020-08-31 | End: 2020-10-06

## 2020-08-31 RX ORDER — SULFASALAZINE 500 MG/1
2000 TABLET ORAL 2 TIMES DAILY
Qty: 240 TABLET | Refills: 2 | Status: SHIPPED | OUTPATIENT
Start: 2020-08-31 | End: 2020-11-29

## 2020-08-31 RX ORDER — OXYCODONE HYDROCHLORIDE 5 MG/1
5 TABLET ORAL EVERY 4 HOURS PRN
Qty: 90 TABLET | Refills: 0 | Status: SHIPPED | OUTPATIENT
Start: 2020-08-31 | End: 2020-09-14 | Stop reason: SDUPTHER

## 2020-08-31 RX ORDER — GABAPENTIN 300 MG/1
1 CAPSULE ORAL EVERY 8 HOURS
COMMUNITY
Start: 2020-08-24 | End: 2020-08-31 | Stop reason: SDUPTHER

## 2020-08-31 RX ORDER — OXYCODONE HYDROCHLORIDE 5 MG/1
1 TABLET ORAL EVERY 4 HOURS PRN
COMMUNITY
Start: 2020-08-24 | End: 2020-08-31 | Stop reason: SDUPTHER

## 2020-08-31 NOTE — TELEPHONE ENCOUNTER
From: Eric Ramirez  To: Jil Ledesma MD  Sent: 8/28/2020 4:45 PM EDT  Subject: Prescription Question    Dr Ledesma,   I was able to  my insulin prescription but they did not give me any needles. Is this so that requires a different prescription?    Thanks  Emily Ramirez

## 2020-09-01 ENCOUNTER — TELEPHONE (OUTPATIENT)
Dept: INTERNAL MEDICINE | Facility: CLINIC | Age: 40
End: 2020-09-01

## 2020-09-01 PROBLEM — R91.1 NODULE OF UPPER LOBE OF RIGHT LUNG: Status: ACTIVE | Noted: 2020-09-01

## 2020-09-02 ENCOUNTER — PRIOR AUTHORIZATION (OUTPATIENT)
Dept: INTERNAL MEDICINE | Facility: CLINIC | Age: 40
End: 2020-09-02

## 2020-09-02 NOTE — TELEPHONE ENCOUNTER
Eric Ramirez [phone]  Spoke to pt, advised of clinical lab results, pt is in agreement with plan. Pt states he recieved a phone call yesterday from the Referred Orthopedic specialist, they advised the pt they wont be able to see him and states that they werr going to send the information back to the PCP office. Advised I will send this message back to PCP to confirm, I do see on the referral where they have contacted us, we will make sure referrals is aware of this issue. Once they have contacted a specialist that is able to see pt, they will contact the pt to schedule the appointment. Pt is in agreement with plan. Good verbal understanding.   Please advise?

## 2020-09-02 NOTE — TELEPHONE ENCOUNTER
Yes, we spoke to ortho as well as his recent [facility] surgery team. We have decided to proceed with referral to cardiothoracic surgery who have agreed to see him, and referral has been sent. If any further intervention is needed, CT surgery will be able to evaluate and manage and I think they will be an excellent resource for us.

## 2020-09-02 NOTE — TELEPHONE ENCOUNTER
Please call pt- labs were relatively normal, other than elevated glucose as we expected. Sodium just slightly decreased but not concerning at this time. Continue with current plan, and we will see him back as scheduled.

## 2020-09-02 NOTE — TELEPHONE ENCOUNTER
Eric Ramirez [phone]  Spoke to pt, advised of clinical message. Pt is in agreement with plan, pt just wants to make sure the specialist is with [facility] and not  if possible. Good verbal understanding.   BELEN

## 2020-09-02 NOTE — TELEPHONE ENCOUNTER
Eric Ramirez (Key: [id number])   Drug: oxycodone HCl 5MG tablets  This request has received a Favorable outcome.  Please note any additional information provided by Dee at the bottom of this request.    Approved:today   PA Case: [id number], Status: Approved, Coverage Starts on: 9/2/2020 12:00:00 AM, Coverage Ends on: 12/1/2020 12:00:00 AM.      [facility] [phone]  Spoke to pharmacist to confirm PA that was approved for oxycodone. Pharmacist confirmed, and states pt picked up the prescription with [employer] card, will run claim on next refill date through insurance. Good verbal understanding.     Emily Ramirez [phone]  Spoke to pt, advised of clinical message. Confirmed we try to do a reimbursement since [facility] states pt paid of it OOP. Pt states no worries with [employer] it was only $16.00. Advised since the PA was approved during the next fill you can have insurance process that to see if the copayment would be cheaper for him. Good verbal understanding.

## 2020-09-14 DIAGNOSIS — S22.20XD CLOSED FRACTURE OF STERNUM WITH RETROSTERNAL CONTUSION WITH ROUTINE HEALING, SUBSEQUENT ENCOUNTER: ICD-10-CM
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
## 2020-09-15 RX ORDER — OXYCODONE HYDROCHLORIDE 5 MG/1
5 TABLET ORAL EVERY 4 HOURS PRN
Qty: 90 TABLET | Refills: 0 | Status: SHIPPED | OUTPATIENT
Start: 2020-09-15 | End: 2020-10-26

## 2020-09-25 RX ORDER — OMEPRAZOLE 20 MG/1
20 CAPSULE, DELAYED RELEASE ORAL DAILY
Qty: 90 CAPSULE | Refills: 1 | Status: SHIPPED | OUTPATIENT
Start: 2020-09-25 | End: 2021-03-21 | Stop reason: SDUPTHER

## 2020-09-26 DIAGNOSIS — S22.20XD CLOSED FRACTURE OF STERNUM WITH RETROSTERNAL CONTUSION WITH ROUTINE HEALING, SUBSEQUENT ENCOUNTER: ICD-10-CM
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
## 2020-09-28 ENCOUNTER — OFFICE VISIT (OUTPATIENT)
Dept: CARDIAC SURGERY | Facility: CLINIC | Age: 40
End: 2020-09-28

## 2020-09-28 VITALS
WEIGHT: 184 LBS | BODY MASS INDEX: 27.25 KG/M2 | DIASTOLIC BLOOD PRESSURE: 91 MMHG | HEART RATE: 68 BPM | SYSTOLIC BLOOD PRESSURE: 158 MMHG | TEMPERATURE: 97.8 F | HEIGHT: 69 IN | OXYGEN SATURATION: 98 %

## 2020-09-28 DIAGNOSIS — S22.20XD CLOSED FRACTURE OF STERNUM WITH RETROSTERNAL CONTUSION WITH ROUTINE HEALING, SUBSEQUENT ENCOUNTER: ICD-10-CM
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
## 2020-09-28 DIAGNOSIS — R91.1 LUNG NODULE: Primary | ICD-10-CM

## 2020-09-28 DIAGNOSIS — S22.22XA CLOSED FRACTURE OF BODY OF STERNUM, INITIAL ENCOUNTER: ICD-10-CM

## 2020-09-28 DIAGNOSIS — Z00.6 EXAMINATION FOR NORMAL COMPARISON FOR CLINICAL RESEARCH: Primary | ICD-10-CM

## 2020-09-28 PROCEDURE — 99205 OFFICE O/P NEW HI 60 MIN: CPT | Performed by: THORACIC SURGERY (CARDIOTHORACIC VASCULAR SURGERY)
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
## 2020-09-29 DIAGNOSIS — S22.20XD CLOSED FRACTURE OF STERNUM WITH RETROSTERNAL CONTUSION WITH ROUTINE HEALING, SUBSEQUENT ENCOUNTER: ICD-10-CM

## 2020-09-29 PROBLEM — S22.22XA CLOSED FRACTURE OF BODY OF STERNUM: Status: ACTIVE | Noted: 2020-09-29

## 2020-09-29 RX ORDER — GABAPENTIN 300 MG/1
300 CAPSULE ORAL 3 TIMES DAILY
Qty: 90 CAPSULE | Refills: 0 | OUTPATIENT
Start: 2020-09-29

## 2020-09-29 RX ORDER — GABAPENTIN 300 MG/1
CAPSULE ORAL
Qty: 90 CAPSULE | Refills: 1 | Status: SHIPPED | OUTPATIENT
Start: 2020-09-29 | End: 2020-10-06

## 2020-10-02 ENCOUNTER — OFFICE VISIT (OUTPATIENT)
Dept: INTERNAL MEDICINE | Facility: CLINIC | Age: 40
End: 2020-10-02

## 2020-10-02 VITALS
RESPIRATION RATE: 15 BRPM | TEMPERATURE: 98.2 F | WEIGHT: 181 LBS | DIASTOLIC BLOOD PRESSURE: 90 MMHG | SYSTOLIC BLOOD PRESSURE: 140 MMHG | HEART RATE: 72 BPM | OXYGEN SATURATION: 96 % | HEIGHT: 69 IN | BODY MASS INDEX: 26.81 KG/M2

## 2020-10-02 DIAGNOSIS — S22.20XD CLOSED FRACTURE OF STERNUM WITH ROUTINE HEALING, UNSPECIFIED PORTION OF STERNUM, SUBSEQUENT ENCOUNTER: Primary | ICD-10-CM
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
## 2020-10-02 PROCEDURE — 99213 OFFICE O/P EST LOW 20 MIN: CPT | Performed by: PHYSICIAN ASSISTANT

## 2020-10-02 RX ORDER — OXYCODONE HYDROCHLORIDE 5 MG/1
5 TABLET ORAL EVERY 4 HOURS PRN
Qty: 90 TABLET | Refills: 0 | OUTPATIENT
Start: 2020-10-02
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
## 2020-10-06 ENCOUNTER — CONSULT (OUTPATIENT)
Dept: CARDIOLOGY | Facility: CLINIC | Age: 40
End: 2020-10-06

## 2020-10-06 VITALS
DIASTOLIC BLOOD PRESSURE: 80 MMHG | BODY MASS INDEX: 26.96 KG/M2 | HEART RATE: 75 BPM | OXYGEN SATURATION: 98 % | HEIGHT: 69 IN | TEMPERATURE: 97.5 F | SYSTOLIC BLOOD PRESSURE: 138 MMHG | WEIGHT: 182 LBS

## 2020-10-06 DIAGNOSIS — I49.3 PVC'S (PREMATURE VENTRICULAR CONTRACTIONS): Primary | ICD-10-CM

## 2020-10-06 PROCEDURE — 93000 ELECTROCARDIOGRAM COMPLETE: CPT | Performed by: INTERNAL MEDICINE

## 2020-10-06 PROCEDURE — 99244 OFF/OP CNSLTJ NEW/EST MOD 40: CPT | Performed by: INTERNAL MEDICINE

## 2020-10-14 RX ORDER — HYDROCHLOROTHIAZIDE 12.5 MG/1
12.5 CAPSULE, GELATIN COATED ORAL DAILY
Qty: 30 CAPSULE | Refills: 11 | Status: SHIPPED | OUTPATIENT
Start: 2020-10-14 | End: 2020-10-29 | Stop reason: ALTCHOICE

## 2020-10-23 ENCOUNTER — TELEPHONE (OUTPATIENT)
Dept: INTERNAL MEDICINE | Facility: CLINIC | Age: 40
End: 2020-10-23

## 2020-10-23 NOTE — TELEPHONE ENCOUNTER
Spoke to pt, advised of clinical message. Letter is ready and has been sent via [employer]. Advised we are not able to fax work excuses, but pt has the option to print the letter via [employer], or we can mail the letter to him. Pt confirmed her will print letter from [employer]. Pt's in agreement with plan. Good verbal understanding.

## 2020-10-23 NOTE — TELEPHONE ENCOUNTER
Pt returned call inquiring about a letter to return to work on light duty. Advised [employer] message was sent to PCP yesterday, PCP has 24-48 hours to respond, will call pt once letter is ready and available on [employer] for pt to print. Good verbal understanding.  Please advise?

## 2020-10-26 ENCOUNTER — OFFICE VISIT (OUTPATIENT)
Dept: ENDOCRINOLOGY | Facility: CLINIC | Age: 40
End: 2020-10-26

## 2020-10-26 ENCOUNTER — LAB (OUTPATIENT)
Dept: LAB | Facility: HOSPITAL | Age: 40
End: 2020-10-26

## 2020-10-26 VITALS
HEART RATE: 74 BPM | DIASTOLIC BLOOD PRESSURE: 78 MMHG | OXYGEN SATURATION: 98 % | HEIGHT: 69 IN | BODY MASS INDEX: 26.78 KG/M2 | SYSTOLIC BLOOD PRESSURE: 128 MMHG | WEIGHT: 180.8 LBS

## 2020-10-26 DIAGNOSIS — E11.9 ENCOUNTER FOR DIABETIC FOOT EXAM (HCC): ICD-10-CM

## 2020-10-26 DIAGNOSIS — E11.65 TYPE 2 DIABETES MELLITUS WITH HYPERGLYCEMIA, UNSPECIFIED WHETHER LONG TERM INSULIN USE (HCC): Primary | ICD-10-CM

## 2020-10-26 LAB
CHOLEST SERPL-MCNC: 180 MG/DL (ref 0–200)
EXPIRATION DATE: ABNORMAL
EXPIRATION DATE: NORMAL
GLUCOSE BLDC GLUCOMTR-MCNC: 278 MG/DL (ref 70–130)
HBA1C MFR BLD: 6.1 %
HDLC SERPL-MCNC: 55 MG/DL (ref 40–60)
LDLC SERPL CALC-MCNC: 103 MG/DL (ref 0–100)
LDLC/HDLC SERPL: 1.81 {RATIO}
Lab: ABNORMAL
Lab: NORMAL
TRIGL SERPL-MCNC: 127 MG/DL (ref 0–150)
VLDLC SERPL-MCNC: 22 MG/DL (ref 5–40)

## 2020-10-26 PROCEDURE — 80061 LIPID PANEL: CPT | Performed by: INTERNAL MEDICINE

## 2020-10-26 PROCEDURE — 83036 HEMOGLOBIN GLYCOSYLATED A1C: CPT | Performed by: INTERNAL MEDICINE

## 2020-10-26 PROCEDURE — 82947 ASSAY GLUCOSE BLOOD QUANT: CPT | Performed by: INTERNAL MEDICINE

## 2020-10-26 PROCEDURE — 99213 OFFICE O/P EST LOW 20 MIN: CPT | Performed by: INTERNAL MEDICINE

## 2020-10-26 RX ORDER — INSULIN GLARGINE 100 [IU]/ML
5 INJECTION, SOLUTION SUBCUTANEOUS DAILY
Qty: 3 ML | Refills: 5 | Status: SHIPPED | OUTPATIENT
Start: 2020-10-26 | End: 2022-03-08
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
## 2020-10-29 ENCOUNTER — OFFICE VISIT (OUTPATIENT)
Dept: CARDIOLOGY | Facility: CLINIC | Age: 40
End: 2020-10-29

## 2020-10-29 VITALS
HEIGHT: 69 IN | DIASTOLIC BLOOD PRESSURE: 86 MMHG | OXYGEN SATURATION: 98 % | HEART RATE: 78 BPM | BODY MASS INDEX: 27.34 KG/M2 | SYSTOLIC BLOOD PRESSURE: 140 MMHG | WEIGHT: 184.6 LBS | TEMPERATURE: 97.7 F

## 2020-10-29 DIAGNOSIS — I10 ESSENTIAL HYPERTENSION: Primary | ICD-10-CM

## 2020-10-29 PROCEDURE — 99213 OFFICE O/P EST LOW 20 MIN: CPT | Performed by: INTERNAL MEDICINE

## 2020-10-29 RX ORDER — ROSUVASTATIN CALCIUM 10 MG/1
10 TABLET, COATED ORAL DAILY
Qty: 30 TABLET | Refills: 6 | Status: SHIPPED | OUTPATIENT
Start: 2020-10-29 | End: 2021-05-27

## 2020-10-29 RX ORDER — HYDROCHLOROTHIAZIDE 25 MG/1
25 TABLET ORAL DAILY
Qty: 30 TABLET | Refills: 11 | Status: SHIPPED | OUTPATIENT
Start: 2020-10-29 | End: 2021-10-22

## 2020-10-31 DIAGNOSIS — R00.2 HEART PALPITATIONS: ICD-10-CM

## 2020-10-31 DIAGNOSIS — I49.3 FREQUENT PVCS: Primary | ICD-10-CM

## 2020-11-02 NOTE — TELEPHONE ENCOUNTER
LOV:10/02/2020  NOV:11/30/2020  Labs:10/26/2020  Return in about 8 weeks (around 11/27/2020) for Follow up.

## 2020-11-03 ENCOUNTER — HOSPITAL ENCOUNTER (OUTPATIENT)
Dept: GENERAL RADIOLOGY | Facility: HOSPITAL | Age: 40
Discharge: HOME | End: 2020-11-03
Admitting: PHYSICIAN ASSISTANT

## 2020-11-03 ENCOUNTER — OFFICE VISIT (OUTPATIENT)
Dept: INTERNAL MEDICINE | Facility: CLINIC | Age: 40
End: 2020-11-03

## 2020-11-03 VITALS
TEMPERATURE: 98 F | WEIGHT: 179.8 LBS | BODY MASS INDEX: 26.55 KG/M2 | DIASTOLIC BLOOD PRESSURE: 92 MMHG | SYSTOLIC BLOOD PRESSURE: 140 MMHG | HEART RATE: 110 BPM | OXYGEN SATURATION: 97 %

## 2020-11-03 DIAGNOSIS — I10 ESSENTIAL HYPERTENSION: ICD-10-CM

## 2020-11-03 DIAGNOSIS — S22.20XD CLOSED FRACTURE OF STERNUM WITH ROUTINE HEALING, UNSPECIFIED PORTION OF STERNUM, SUBSEQUENT ENCOUNTER: ICD-10-CM
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
## 2020-11-03 DIAGNOSIS — S22.20XD CLOSED FRACTURE OF STERNUM WITH ROUTINE HEALING, UNSPECIFIED PORTION OF STERNUM, SUBSEQUENT ENCOUNTER: Primary | ICD-10-CM

## 2020-11-03 PROCEDURE — 99213 OFFICE O/P EST LOW 20 MIN: CPT | Performed by: PHYSICIAN ASSISTANT

## 2020-11-03 PROCEDURE — 71046 X-RAY EXAM CHEST 2 VIEWS: CPT
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
## 2020-11-11 ENCOUNTER — TELEPHONE (OUTPATIENT)
Dept: INTERNAL MEDICINE | Facility: CLINIC | Age: 40
End: 2020-11-11

## 2020-11-11 NOTE — TELEPHONE ENCOUNTER
----- Message from Henrietta Nugent PA-C sent at 11/11/2020 10:39 AM EST -----  Please print on letterhead and I will sign:         To Whom It May Concern:       Mr. Eric Ramirez is a patient under my care at [facility]. He is released to return to work without restrictions on 11/12/2020. He has been advised to use caution and continue to avoid heavy lifting when possible, when not absolutely required in the line of duty.     He has been compliant with follow-up appointments and treatment plans. We will continue to see this patient on an ongoing basis to reassess his conditions and ability to work moving forward.     Please do not hesitate to call with any questions or concerns.           Sincerely,             Henrietta Nugent PA-C

## 2020-11-13 ENCOUNTER — TELEPHONE (OUTPATIENT)
Dept: INTERNAL MEDICINE | Facility: CLINIC | Age: 40
End: 2020-11-13

## 2020-11-13 NOTE — TELEPHONE ENCOUNTER
Please call pt- his return to work for full duty without restrictions is ready for . Please note that he still had some swelling around his sternal fracture, and I still want him to follow the protocols we discussed in office; however, he may return to work. Letter at .

## 2020-11-16 ENCOUNTER — TELEPHONE (OUTPATIENT)
Dept: INTERNAL MEDICINE | Facility: CLINIC | Age: 40
End: 2020-11-16

## 2020-11-16 NOTE — TELEPHONE ENCOUNTER
PATIENT CALLED AND STATED HE WOULD LIKE HIS WORK NOTE TO BE UPLOADED INTO HIS MYCHART     PLEASE CALL AND ADVISE -[phone]

## 2020-11-16 NOTE — TELEPHONE ENCOUNTER
Looks like letter was written on 11/11/2020. Please confirm if there is any additional letters for the pt's return to work. Please advise?

## 2020-11-16 NOTE — TELEPHONE ENCOUNTER
Spoke to pt, states he was able to  the letter this morning. Good verbal understanding. Closing call.

## 2020-11-27 DIAGNOSIS — K51.019 ULCERATIVE PANCOLITIS WITH COMPLICATION (HCC): ICD-10-CM

## 2020-11-29 RX ORDER — SULFASALAZINE 500 MG/1
TABLET ORAL
Qty: 240 TABLET | Refills: 5 | Status: SHIPPED | OUTPATIENT
Start: 2020-11-29 | End: 2021-05-28

## 2021-02-05 NOTE — TELEPHONE ENCOUNTER
Caller: Erci Ramirez    Relationship: Self    Best call back number: [phone]    Medication needed:   Requested Prescriptions     Pending Prescriptions Disp Refills   • buPROPion XL (WELLBUTRIN XL) 300 MG 24 hr tablet        Sig: Take 1 tablet by mouth Daily.       When do you need the refill by: 02/06/21    What details did the patient provide when requesting the medication: patient has 2 days left    Does the patient have less than a 3 day supply:  [x] Yes  [] No    What is the patient's preferred pharmacy: [facility] [address] [phone] [place] [phone]

## 2021-02-05 NOTE — TELEPHONE ENCOUNTER
LOV:11/03/2020  NOV:03/03/2021  Labs: 10/26/2020    Return in about 4 months (around 3/3/2021) for Annual physical.

## 2021-02-07 RX ORDER — BUPROPION HYDROCHLORIDE 300 MG/1
300 TABLET ORAL DAILY
Qty: 30 TABLET | Refills: 5 | Status: SHIPPED | OUTPATIENT
Start: 2021-02-07 | End: 2021-04-29 | Stop reason: SDUPTHER

## 2021-03-03 ENCOUNTER — OFFICE VISIT (OUTPATIENT)
Dept: INTERNAL MEDICINE | Facility: CLINIC | Age: 41
End: 2021-03-03

## 2021-03-03 VITALS
BODY MASS INDEX: 27.05 KG/M2 | SYSTOLIC BLOOD PRESSURE: 128 MMHG | WEIGHT: 182.6 LBS | HEART RATE: 77 BPM | OXYGEN SATURATION: 93 % | RESPIRATION RATE: 15 BRPM | HEIGHT: 69 IN | TEMPERATURE: 97 F | DIASTOLIC BLOOD PRESSURE: 90 MMHG

## 2021-03-03 DIAGNOSIS — I10 ESSENTIAL HYPERTENSION: ICD-10-CM

## 2021-03-03 DIAGNOSIS — I73.00 RAYNAUD'S PHENOMENON WITHOUT GANGRENE: ICD-10-CM

## 2021-03-03 DIAGNOSIS — Z00.00 ENCOUNTER FOR HEALTH MAINTENANCE EXAMINATION: Primary | ICD-10-CM

## 2021-03-03 DIAGNOSIS — Z12.5 SCREENING FOR MALIGNANT NEOPLASM OF PROSTATE: ICD-10-CM

## 2021-03-03 DIAGNOSIS — K50.919 CROHN'S DISEASE WITH COMPLICATION, UNSPECIFIED GASTROINTESTINAL TRACT LOCATION (HCC): ICD-10-CM

## 2021-03-03 DIAGNOSIS — IMO0002 DIABETES MELLITUS TYPE 2, UNCONTROLLED, WITH COMPLICATIONS: ICD-10-CM

## 2021-03-03 LAB
ALBUMIN SERPL-MCNC: 4.6 G/DL (ref 3.5–5.2)
ALBUMIN/GLOB SERPL: 1.5 G/DL
ALP SERPL-CCNC: 109 U/L (ref 39–117)
ALT SERPL W P-5'-P-CCNC: 22 U/L (ref 1–41)
ANION GAP SERPL CALCULATED.3IONS-SCNC: 9.7 MMOL/L (ref 5–15)
AST SERPL-CCNC: 15 U/L (ref 1–40)
BASOPHILS # BLD AUTO: 0.12 10*3/MM3 (ref 0–0.2)
BASOPHILS NFR BLD AUTO: 2.2 % (ref 0–1.5)
BILIRUB SERPL-MCNC: 0.6 MG/DL (ref 0–1.2)
BUN SERPL-MCNC: 16 MG/DL (ref 6–20)
BUN/CREAT SERPL: 13.7 (ref 7–25)
CALCIUM SPEC-SCNC: 9.6 MG/DL (ref 8.6–10.5)
CHLORIDE SERPL-SCNC: 92 MMOL/L (ref 98–107)
CO2 SERPL-SCNC: 27.3 MMOL/L (ref 22–29)
CREAT SERPL-MCNC: 1.17 MG/DL (ref 0.76–1.27)
DEPRECATED RDW RBC AUTO: 37.2 FL (ref 37–54)
EOSINOPHIL # BLD AUTO: 0.17 10*3/MM3 (ref 0–0.4)
EOSINOPHIL NFR BLD AUTO: 3.1 % (ref 0.3–6.2)
ERYTHROCYTE [DISTWIDTH] IN BLOOD BY AUTOMATED COUNT: 11.2 % (ref 12.3–15.4)
EXPIRATION DATE: NORMAL
GFR SERPL CREATININE-BSD FRML MDRD: 68 ML/MIN/1.73
GLOBULIN UR ELPH-MCNC: 3 GM/DL
GLUCOSE SERPL-MCNC: 461 MG/DL (ref 65–99)
HBA1C MFR BLD: 8.5 %
HCT VFR BLD AUTO: 46.9 % (ref 37.5–51)
HGB BLD-MCNC: 16.2 G/DL (ref 13–17.7)
IMM GRANULOCYTES # BLD AUTO: 0.02 10*3/MM3 (ref 0–0.05)
IMM GRANULOCYTES NFR BLD AUTO: 0.4 % (ref 0–0.5)
LYMPHOCYTES # BLD AUTO: 0.77 10*3/MM3 (ref 0.7–3.1)
LYMPHOCYTES NFR BLD AUTO: 14.2 % (ref 19.6–45.3)
Lab: NORMAL
MCH RBC QN AUTO: 31.5 PG (ref 26.6–33)
MCHC RBC AUTO-ENTMCNC: 34.5 G/DL (ref 31.5–35.7)
MCV RBC AUTO: 91.1 FL (ref 79–97)
MONOCYTES # BLD AUTO: 0.41 10*3/MM3 (ref 0.1–0.9)
MONOCYTES NFR BLD AUTO: 7.6 % (ref 5–12)
NEUTROPHILS NFR BLD AUTO: 3.94 10*3/MM3 (ref 1.7–7)
NEUTROPHILS NFR BLD AUTO: 72.5 % (ref 42.7–76)
NRBC BLD AUTO-RTO: 0 /100 WBC (ref 0–0.2)
PLATELET # BLD AUTO: 232 10*3/MM3 (ref 140–450)
PMV BLD AUTO: 11 FL (ref 6–12)
POTASSIUM SERPL-SCNC: 4.5 MMOL/L (ref 3.5–5.2)
PROT SERPL-MCNC: 7.6 G/DL (ref 6–8.5)
PSA SERPL-MCNC: 0.48 NG/ML (ref 0–4)
RBC # BLD AUTO: 5.15 10*6/MM3 (ref 4.14–5.8)
SODIUM SERPL-SCNC: 129 MMOL/L (ref 136–145)
WBC # BLD AUTO: 5.43 10*3/MM3 (ref 3.4–10.8)

## 2021-03-03 PROCEDURE — 85025 COMPLETE CBC W/AUTO DIFF WBC: CPT | Performed by: PHYSICIAN ASSISTANT

## 2021-03-03 PROCEDURE — 99396 PREV VISIT EST AGE 40-64: CPT | Performed by: PHYSICIAN ASSISTANT

## 2021-03-03 PROCEDURE — 80053 COMPREHEN METABOLIC PANEL: CPT | Performed by: PHYSICIAN ASSISTANT

## 2021-03-03 PROCEDURE — 36415 COLL VENOUS BLD VENIPUNCTURE: CPT | Performed by: PHYSICIAN ASSISTANT

## 2021-03-03 PROCEDURE — 83036 HEMOGLOBIN GLYCOSYLATED A1C: CPT | Performed by: PHYSICIAN ASSISTANT

## 2021-03-03 PROCEDURE — G0103 PSA SCREENING: HCPCS | Performed by: PHYSICIAN ASSISTANT

## 2021-03-03 NOTE — PROGRESS NOTES
"Chief Complaint   Patient presents with   • Annual Exam     Non-Fasting, Discuss medications   • Hand Pain     Raynauds dx discuss treatment, intermittent, turns bleach white       Subjective       History of Present Illness     Eric Ramirez is a 40 y.o. male. He presents for his annual physical. Overall doing very well. He has completed Covid vaccines (Moderna) 1/12/2021 [facility]. He does have Type II DM, using insulin as directed. AM fasting glucose 190-220 on average. He denies headaches or foggy-headedness, vision change, abdominal pain, N/V/D, frequent urination or changes in urination or BMs, or neuropathy. No new concerns. Most recent HbA1c of 6.1 in Oct. 2020. Follows with Dr. Ledesma in endocrinology.     HTN, taking HCTZ as directed. He does not routinely check his BP at home. He denies chest pain, SOA, HA, vision change or swelling of extremities.     HLD on Crestor.  Denies chest pain, HA, fatigue, myalgias or weakness. No new concerns.     He does have a new complaint of possible Raynauds, with his fingers turning \"bleach white\" when he is in the cold for very long. This is not painful. His sister has Raynauds and suspects he has the same. He uses hand warmers during the winter when he is working which does help.     Are you currently seeing any other doctors or specialists? Dr. Ledesma-- endocrinology; Dr. Sommers-- cardiology;  Dr. Montano-- gastroenterology   Are you currently taking any OTC medications or herbal medications?     Sleep: 7-8 hours/ night   Diet: fairly healthy, per pt   Exercise: walking, body weight exercises 3-4 days/ week    Most recent colonoscopy: 7/28/2016  Regular dental visits: No, not UTD   Regular eye exams: Not UTD but has appt 3/5/2021-- Dr. Nath/ [facility]     PHQ-2 Depression Screening  Little interest or pleasure in doing things? 0   Feeling down, depressed, or hopeless? 0   PHQ-2 Total Score 0         The following portions of the " patient's history were reviewed and updated as appropriate: allergies, current medications, past family history, past medical history, past social history, past surgical history and problem list.    Allergies   Allergen Reactions   • Levofloxacin Itching     Throat itching, eyes watering   • Buspirone Itching   • Flagyl [Metronidazole] Hives and Swelling     Social History     Tobacco Use   • Smoking status: Former Smoker     Packs/day: 1.00     Years: 15.00     Pack years: 15.00     Types: Cigarettes     Quit date:      Years since quittin.1   • Smokeless tobacco: Never Used   Substance Use Topics   • Alcohol use: Yes     Alcohol/week: 2.0 - 3.0 standard drinks     Types: 2 - 3 Cans of beer per week     Comment: social       Past Surgical History:   Procedure Laterality Date   • CHOLECYSTECTOMY     • COLONOSCOPY  [address] [facility]     Family History   Problem Relation Age of Onset   • Hypertension Mother    • Melanoma Father    • Heart disease Father    • Scleroderma Sister    • Pulmonary fibrosis Sister    • Pyloric stenosis Sister    • Colon polyps Neg Hx    • Colon cancer Neg Hx          Current Outpatient Medications:   •  buPROPion XL (WELLBUTRIN XL) 300 MG 24 hr tablet, Take 1 tablet by mouth Daily., Disp: 30 tablet, Rfl: 5  •  Cetirizine HCl (ZYRTEC ALLERGY PO), Take 1 tablet by mouth daily., Disp: , Rfl:   •  FOLIC ACID PO, Take 1 tablet by mouth Daily., Disp: , Rfl:   •  hydroCHLOROthiazide (HYDRODIURIL) 25 MG tablet, Take 1 tablet by mouth Daily., Disp: 30 tablet, Rfl: 11  •  Insulin Glargine (Lantus SoloStar) 100 UNIT/ML injection pen, Inject 5 Units under the skin into the appropriate area as directed Daily., Disp: 3 mL, Rfl: 5  •  Insulin Pen Needle 31G X 5 MM misc, 1 each Daily. Use to inject insulin, Disp: 100 each, Rfl: 11  •  omeprazole (priLOSEC) 20 MG capsule, Take 1 capsule by mouth Daily., Disp: 90 capsule, Rfl: 1  •  Probiotic Product (PROBIOTIC DAILY PO), Take 1 tablet by  mouth Daily., Disp: , Rfl:   •  rosuvastatin (CRESTOR) 10 MG tablet, Take 1 tablet by mouth Daily., Disp: 30 tablet, Rfl: 6  •  sulfaSALAzine (AZULFIDINE) 500 MG tablet, TAKE FOUR TABLETS BY MOUTH TWICE A DAY, Disp: 240 tablet, Rfl: 5  •  ondansetron ODT (ZOFRAN-ODT) 4 MG disintegrating tablet, Place 1 tablet on the tongue Every 8 (Eight) Hours As Needed for Nausea or Vomiting for up to 6 doses., Disp: 6 tablet, Rfl: 0    Patient Active Problem List   Diagnosis   • Generalized anxiety disorder   • Thrombocytopenia (CMS/HCC)   • Ulcerative colitis (CMS/HCC)   • HTN (hypertension)   • Joint pain   • Immunosuppressed status (CMS/HCC)   • Crohn's disease (CMS/HCC)   • IBS (irritable bowel syndrome)   • Migraine   • Elevated liver enzymes   • High risk for colon cancer   • Family history of scleroderma   • Diabetes mellitus type 2, uncontrolled, with complications (CMS/HCC)   • Abnormal gait   • Deviated nasal septum   • Gastroesophageal reflux disease without esophagitis   • Inflammatory bowel arthritis   • Pancreatitis   • Sensorineural hearing loss   • Lung nodule   • Closed fracture of body of sternum       Review of Systems   Constitutional: Negative for chills, fatigue, fever, unexpected weight gain and unexpected weight loss.   HENT: Negative for congestion, ear pain, sore throat and trouble swallowing.    Eyes: Negative for pain and visual disturbance.   Respiratory: Negative for cough, shortness of breath and wheezing.    Cardiovascular: Negative for chest pain and palpitations.   Gastrointestinal: Negative for abdominal pain, diarrhea, nausea and vomiting.   Genitourinary: Negative for dysuria and hematuria.   Musculoskeletal: Negative for back pain.   Skin: Positive for color change (Raynauds). Negative for rash.   Allergic/Immunologic: Negative for immunocompromised state.   Neurological: Negative for dizziness, syncope, weakness and headache.   Hematological: Does not bruise/bleed easily.    Psychiatric/Behavioral: Negative for depressed mood. The patient is not nervous/anxious.        Objective   Vitals:    03/03/21 0824   BP: 128/90   Pulse: 77   Resp: 15   Temp: 97 °F (36.1 °C)   SpO2: 93%     Physical Exam  Vitals signs reviewed.   Constitutional:       Appearance: Normal appearance. He is well-developed.   HENT:      Head: Normocephalic and atraumatic.      Right Ear: Tympanic membrane, ear canal and external ear normal. No tenderness.      Left Ear: Tympanic membrane, ear canal and external ear normal. No tenderness.      Nose: Nose normal. No congestion.      Mouth/Throat:      Mouth: Mucous membranes are moist. No oral lesions.      Pharynx: Oropharynx is clear.   Eyes:      General: No scleral icterus.     Conjunctiva/sclera: Conjunctivae normal.      Pupils: Pupils are equal, round, and reactive to light.   Neck:      Musculoskeletal: Normal range of motion and neck supple.      Thyroid: No thyromegaly.      Vascular: No carotid bruit.   Cardiovascular:      Rate and Rhythm: Normal rate and regular rhythm.      Pulses: Normal pulses.           Radial pulses are 2+ on the right side and 2+ on the left side.        Dorsalis pedis pulses are 2+ on the right side and 2+ on the left side.      Heart sounds: Normal heart sounds. No murmur. No gallop.    Pulmonary:      Effort: Pulmonary effort is normal.      Breath sounds: Normal breath sounds. No wheezing or rales.   Chest:      Chest wall: No deformity.   Abdominal:      General: Bowel sounds are normal.      Palpations: Abdomen is soft. There is no mass.      Tenderness: There is no abdominal tenderness. Negative signs include Hayward's sign.   Musculoskeletal: Normal range of motion.         General: No tenderness or deformity.   Lymphadenopathy:      Cervical: No cervical adenopathy.   Skin:     General: Skin is warm and dry.      Findings: No erythema or rash.      Comments: No atypical nevi.    Neurological:      Mental Status: He is alert  and oriented to person, place, and time.   Psychiatric:         Behavior: Behavior normal.               Assessment/Plan   Diagnoses and all orders for this visit:    1. Encounter for health maintenance examination (Primary)  -     CBC & Differential  -     Comprehensive Metabolic Panel  -     POC Glycosylated Hemoglobin (Hb A1C)  -     PSA Screen  -     CBC Auto Differential    2. Essential hypertension  -     CBC & Differential  -     Comprehensive Metabolic Panel  -     CBC Auto Differential    3. Diabetes mellitus type 2, uncontrolled, with complications (CMS/HCC)  -     CBC & Differential  -     Comprehensive Metabolic Panel  -     POC Glycosylated Hemoglobin (Hb A1C)  -     CBC Auto Differential    4. Crohn's disease with complication, unspecified gastrointestinal tract location (CMS/HCC)  -     CBC & Differential  -     Comprehensive Metabolic Panel  -     CBC Auto Differential    5. Screening for malignant neoplasm of prostate  -     PSA Screen    6. Raynaud's phenomenon without gangrene      Mild Raynauds suspected. Discussed continuation of hand warmers and avoiding long outdoor exposures when possible. With mild sx, no Rx tx needed. Pt with good understanding and in agreement with plan.   Further plans after review of labs.          Patient education discussed during this visit:  - avoidance of texting while driving and the need for wearing seatbelt  - use of sunscreen  - healthy sleep habits and appropriate amount of sleep  - H2O consumption, well-balanced diet  - exercise routine which includes at least 150 minutes of cardio per week + muscle strengthening exercises  - immunizations including annual flu vaccination      Return in about 6 months (around 9/3/2021) for Follow up.

## 2021-03-19 ENCOUNTER — TELEPHONE (OUTPATIENT)
Dept: INTERNAL MEDICINE | Facility: CLINIC | Age: 41
End: 2021-03-19

## 2021-03-19 NOTE — TELEPHONE ENCOUNTER
Please call pt- his HbA1c has worsened, now at 8.5 as compared to previous 6.1. I want him to increase his Lantus to 8 units daily, and strongly encourage him to schedule follow up with endocrinology for continued management. His spot glucose was also extremely high at 461, so I want him checking his glucose 2-3x/ day until we can get this under better control.   Sodium a little low, but we will continue to monitor at his routine visits. All other labs WNL.

## 2021-03-19 NOTE — TELEPHONE ENCOUNTER
S/W patient and he has an appt with his endocrinologist to f/u on his recent results. Please advise

## 2021-03-21 DIAGNOSIS — K21.9 GASTROESOPHAGEAL REFLUX DISEASE WITHOUT ESOPHAGITIS: Primary | ICD-10-CM

## 2021-03-21 RX ORDER — OMEPRAZOLE 20 MG/1
20 CAPSULE, DELAYED RELEASE ORAL DAILY
Qty: 90 CAPSULE | Refills: 3 | Status: SHIPPED | OUTPATIENT
Start: 2021-03-21 | End: 2022-03-21

## 2021-03-21 NOTE — TELEPHONE ENCOUNTER
Last Office Visit: 03/03/2021  Next Office Visit: None scheduled     Labs completed in past 6 months? yes  Labs completed in past year? yes    Last Refill Date: 09/25/2020  Quantity: 90  Refills: 1    Pharmacy:  [facility] [address] [place] [address] [phone] [facility] [phone] [address] [address]   Phone:  [phone]  Fax:  [phone]

## 2021-03-21 NOTE — TELEPHONE ENCOUNTER
----- Message from Eric Ramirez sent at 3/20/2021  7:19 PM EDT -----  Regarding: Prescription Question  Contact: [phone]  Edmond Mckeon,  My Omeprazole prescription is out with no refills. Could you please write me a 90 prescription?   Thank you  Emily Ramirez

## 2021-04-29 RX ORDER — BUPROPION HYDROCHLORIDE 300 MG/1
300 TABLET ORAL DAILY
Qty: 30 TABLET | Refills: 5 | Status: SHIPPED | OUTPATIENT
Start: 2021-04-29 | End: 2022-05-02

## 2021-04-29 NOTE — TELEPHONE ENCOUNTER
LOV for chronic condition  3/3/2021  DANA AKHTAR Please  Needs to schedule a  month follow up around 9/3/2021

## 2021-05-27 RX ORDER — ROSUVASTATIN CALCIUM 10 MG/1
TABLET, COATED ORAL
Qty: 30 TABLET | Refills: 5 | Status: SHIPPED | OUTPATIENT
Start: 2021-05-27 | End: 2021-11-24

## 2021-05-28 DIAGNOSIS — K51.019 ULCERATIVE PANCOLITIS WITH COMPLICATION (HCC): ICD-10-CM

## 2021-05-28 RX ORDER — SULFASALAZINE 500 MG/1
TABLET ORAL
Qty: 240 TABLET | Refills: 0 | Status: SHIPPED | OUTPATIENT
Start: 2021-05-28 | End: 2021-06-28 | Stop reason: SDUPTHER

## 2021-06-01 ENCOUNTER — TELEPHONE (OUTPATIENT)
Dept: CARDIAC SURGERY | Facility: CLINIC | Age: 41
End: 2021-06-01

## 2021-06-01 NOTE — TELEPHONE ENCOUNTER
I called Mr. Ramirez to see if he was ready to schedule his 9 mo f/u and CT chest with Dr. Roblero. He was at work so he stated he would call back later to set everything up. Please confirm if he can have his CT at [facility] when he calls back.

## 2021-06-05 NOTE — PROGRESS NOTES
Subjective   Eric Ramirez is a 36 y.o. male.     Anxiety   Presents for follow-up visit. Symptoms include nervous/anxious behavior. Patient reports no compulsions, confusion, decreased concentration, dizziness, excessive worry, irritability, nausea, palpitations, shortness of breath or suicidal ideas. Symptoms occur occasionally. The severity of symptoms is mild. The quality of sleep is good. Nighttime awakenings: occasional.       Heartburn   He complains of heartburn. He reports no abdominal pain, no dysphagia, no nausea or no sore throat. This is a chronic problem. The current episode started more than 1 year ago. The problem occurs occasionally. The problem has been unchanged. The heartburn is located in the substernum. The heartburn is of mild intensity. The symptoms are aggravated by certain foods. Pertinent negatives include no fatigue or melena. Risk factors include hiatal hernia. He has tried a PPI and a diet change for the symptoms. The treatment provided significant relief.        The following portions of the patient's history were reviewed and updated as appropriate: allergies, current medications, past social history and problem list.    Review of Systems   Constitutional: Negative for appetite change, fatigue and irritability.   HENT: Negative for congestion and sore throat.    Respiratory: Negative for chest tightness and shortness of breath.    Cardiovascular: Negative for palpitations.   Gastrointestinal: Positive for heartburn. Negative for abdominal pain, blood in stool, diarrhea, dysphagia, melena and nausea.   Musculoskeletal: Positive for arthralgias.   Allergic/Immunologic: Negative for environmental allergies.   Neurological: Negative for dizziness, tremors, weakness, light-headedness and headaches.   Psychiatric/Behavioral: Positive for dysphoric mood and sleep disturbance. Negative for agitation, behavioral problems, confusion, decreased concentration and suicidal ideas. The patient is  "nervous/anxious.        Objective   /84  Pulse 86  Resp 16  Ht 69\" (175.3 cm)  Wt 181 lb 3.2 oz (82.2 kg)  SpO2 97%  BMI 26.76 kg/m2  Physical Exam   Constitutional: He is oriented to person, place, and time. He appears well-developed and well-nourished. He is cooperative.   HENT:   Head: Normocephalic.   Nose: Nose normal.   Mouth/Throat: Oropharynx is clear and moist.   Eyes: Conjunctivae are normal. Pupils are equal, round, and reactive to light. No scleral icterus.   Neck: Neck supple. Carotid bruit is not present. No thyromegaly present.   Cardiovascular: Normal rate and regular rhythm.    Pulmonary/Chest: Effort normal and breath sounds normal.   Abdominal: Soft. Bowel sounds are normal. There is no hepatosplenomegaly.   Musculoskeletal: Normal range of motion.   Neurological: He is alert and oriented to person, place, and time.   No focal deficits no lateralizing signs   Skin: Skin is warm and dry. No rash noted.   Psychiatric: He has a normal mood and affect. Cognition and memory are normal.   Nursing note and vitals reviewed.      Assessment/Plan   Problem List Items Addressed This Visit     None      Visit Diagnoses     Anxiety    -  Primary    Gastroesophageal reflux disease, esophagitis presence not specified        Relevant Medications    omeprazole (priLOSEC) 20 MG capsule          New Medications Ordered This Visit   Medications   • buPROPion XL (WELLBUTRIN XL) 150 MG 24 hr tablet     Sig: Take 1 tablet by mouth Daily.     Dispense:  90 tablet     Refill:  1   • omeprazole (priLOSEC) 20 MG capsule     Sig: Take 1 capsule by mouth Daily.     Dispense:  90 capsule     Refill:  1              " 85 y/o M with PMHx of HTN, HLD, CAD, HFpEF, s/p Right CEA for Carotid artery disease, ESRD on HD 2d/week (M/Fri) via LUE fistula, s/p flecainide w/ ILR placed 6/2020, AS s/p TAVR, and PAD (RLE fem-pop bypass October 2020) multiple gastric AVM's s/p cauterized s/p LLE fem-pop bypass, and had the bypass performed on 3/20/21, post procedure developed L femoral DVT and s/p revision of LLE bypass 3/25/21 brought to ED with cc of b/l LE ext weakness with difficulty to ambulate for past 2 days. In addition, per daughter pt has slurred speech since yesterday. In ED CT head negative however has elevated trop. Pt Denies any sob, no chest pain, no n/v or abd pain. Per pt has lower extremity weakness and cant walk. (02 Jun 2021 14:43)    >R/O CVA in setting of b/l LE weakness and slurred speech  -initial CT head negative for acute pathology  -MRI: Bilateral frontal subdural collection suggestive of subdural hematomas or subdural hygromas   - repeat ct head 6/5 < from: CT Head No Cont (06.05.21 @ 08:20) >he ventricles and sulci are prominent, compatible with age-related generalized cerebral volume loss.   There is no CT evidence for acute cerebral cortical infarct. There is no evidence of hemorrhage. No mass effect is found in the brain.  There is no midline shift or herniation pattern. Nonspecific chronic appearing very small coarse calcification in the left high posterior parietal cortex. The visualized portions of the paranasal sinuses and mastoid air cells are clear. No evidence of acute intracranial abnormality.  No evidence of hemorrhage. Chronic changes as above.  -PT eval appreciated   -Continue Lipitor 80mg po qhs and Plavix 75mg daily   -Neurology / neurosurgery is following   - monitor q 2 neuro checks while on heparin gtt     >Left Lower Extremity DVT  -B/L LE edema, L>R  -Venous duplex b/l LE: New extension of LLE thrombus into L femoral vein  -MRI head with subdural collection suggestive of subdural hematoma or hygroma  -MRI spine pending to r/o epidural hematoma - if negative will go ahead with heparin initiation  -Vascular surgery consult appreciated: no IVC filter recommended at this time , c/w heparin gtt     #Elevated Troponin levels in setting of ESRD  -Asymptomatic   -EKG: NSR, rate 82/min, first degree AV block   -Continue telemetry monitoring   -Cardiology consult appreciated     #Generalized weakness, leg weakness    -PT eval appreciated   -likely due to LLE edema that is prominent    #PVD/HLD/HTN  -continue Plavix statin, nifedipine Imdur    #ESRD on HD  -Continue HD  -Nephrology consult appreciated     #Depression  -Continue duloxetine    #Hx of GI bleed due to gastric AVMs  -Continue PPI  -h/h stable     DISPO: initiate heparin gtt pending MRI for new extension of L femoral vein thrombus     Above discussed with Dr. Vargas      83 y/o M with PMHx of HTN, HLD, CAD, HFpEF, s/p Right CEA for Carotid artery disease, ESRD on HD 2d/week (M/Fri) via LUE fistula, s/p flecainide w/ ILR placed 6/2020, AS s/p TAVR, and PAD (RLE fem-pop bypass October 2020) multiple gastric AVM's s/p cauterized s/p LLE fem-pop bypass, and had the bypass performed on 3/20/21, post procedure developed L femoral DVT and s/p revision of LLE bypass 3/25/21 brought to ED with cc of b/l LE ext weakness with difficulty to ambulate for past 2 days. In addition, per daughter pt has slurred speech since yesterday. In ED CT head negative however has elevated trop. Pt Denies any sob, no chest pain, no n/v or abd pain. Per pt has lower extremity weakness and cant walk. (02 Jun 2021 14:43)    >R/O CVA in setting of b/l LE weakness and slurred speech  -initial CT head negative for acute pathology  -MRI: Bilateral frontal subdural collection suggestive of subdural hematomas or subdural hygromas   - repeat ct head 6/5 < from: CT Head No Cont (06.05.21 @ 08:20) >he ventricles and sulci are prominent, compatible with age-related generalized cerebral volume loss.   There is no CT evidence for acute cerebral cortical infarct. There is no evidence of hemorrhage. No mass effect is found in the brain.  There is no midline shift or herniation pattern. Nonspecific chronic appearing very small coarse calcification in the left high posterior parietal cortex. The visualized portions of the paranasal sinuses and mastoid air cells are clear. No evidence of acute intracranial abnormality.  No evidence of hemorrhage. Chronic changes as above.  -PT eval appreciated   -Continue Lipitor 80mg po qhs and Plavix 75mg daily   -Neurology / neurosurgery is following   - monitor q 2 neuro checks while on heparin gtt     >Left Lower Extremity DVT  -B/L LE edema, L>R  -Venous duplex b/l LE: New extension of LLE thrombus into L femoral vein  -MRI head with subdural collection suggestive of subdural hematoma or hygroma  -MRI spine pending to r/o epidural hematoma - if negative will go ahead with heparin initiation  -Vascular surgery consult appreciated: no IVC filter recommended at this time , c/w heparin gtt , eventual plan for changing to sq tx dose lovenox once cleared by neurosurgery and vascualr     >Elevated Troponin levels in setting of ESRD  -Asymptomatic   -EKG: NSR, rate 82/min, first degree AV block   -Continue telemetry monitoring   -Cardiology consult appreciated     >Generalized weakness, leg weakness    -PT eval appreciated   -likely due to LLE edema that is prominent    >PVD/HLD/HTN  -continue Plavix statin, nifedipine Imdur    >ESRD on HD  -Continue HD  -Nephrology consult appreciated     >Depression  -Continue duloxetine    >Hx of GI bleed due to gastric AVMs, follows with dr. barrera as op   - monitor h/h   -Continue PPI    >dispo: remains acute , monitor neurochecks , plan for switching to sq lovenox once cleared by  neurosurgery , gi and vascular. patient has not been able to tolerate eliquis or brillanta in past. plan of care discussed with patient and daughter Vanessa on phone.

## 2021-06-28 DIAGNOSIS — K51.019 ULCERATIVE PANCOLITIS WITH COMPLICATION (HCC): ICD-10-CM

## 2021-06-28 RX ORDER — SULFASALAZINE 500 MG/1
2000 TABLET ORAL 2 TIMES DAILY
Qty: 240 TABLET | Refills: 0 | Status: CANCELLED | OUTPATIENT
Start: 2021-06-28

## 2021-06-28 RX ORDER — SULFASALAZINE 500 MG/1
2000 TABLET ORAL 2 TIMES DAILY
Qty: 240 TABLET | Refills: 0 | Status: SHIPPED | OUTPATIENT
Start: 2021-06-28 | End: 2021-08-09

## 2021-08-08 DIAGNOSIS — K51.019 ULCERATIVE PANCOLITIS WITH COMPLICATION (HCC): ICD-10-CM

## 2021-08-09 RX ORDER — SULFASALAZINE 500 MG/1
TABLET ORAL
Qty: 240 TABLET | Refills: 3 | Status: SHIPPED | OUTPATIENT
Start: 2021-08-09 | End: 2022-03-29 | Stop reason: SDUPTHER

## 2021-09-07 ENCOUNTER — LAB (OUTPATIENT)
Dept: LAB | Facility: HOSPITAL | Age: 41
End: 2021-09-07

## 2021-09-07 ENCOUNTER — OFFICE VISIT (OUTPATIENT)
Dept: INTERNAL MEDICINE | Facility: CLINIC | Age: 41
End: 2021-09-07

## 2021-09-07 VITALS
HEIGHT: 69 IN | SYSTOLIC BLOOD PRESSURE: 122 MMHG | BODY MASS INDEX: 27.4 KG/M2 | DIASTOLIC BLOOD PRESSURE: 90 MMHG | WEIGHT: 185 LBS | OXYGEN SATURATION: 95 % | RESPIRATION RATE: 16 BRPM | HEART RATE: 94 BPM | TEMPERATURE: 98 F

## 2021-09-07 DIAGNOSIS — IMO0002 DIABETES MELLITUS TYPE 2, UNCONTROLLED, WITH COMPLICATIONS: Primary | ICD-10-CM

## 2021-09-07 DIAGNOSIS — E78.49 OTHER HYPERLIPIDEMIA: ICD-10-CM

## 2021-09-07 DIAGNOSIS — I10 ESSENTIAL HYPERTENSION: ICD-10-CM

## 2021-09-07 LAB
EXPIRATION DATE: NORMAL
HBA1C MFR BLD: 6.7 %
Lab: NORMAL

## 2021-09-07 PROCEDURE — 83036 HEMOGLOBIN GLYCOSYLATED A1C: CPT | Performed by: PHYSICIAN ASSISTANT

## 2021-09-07 PROCEDURE — 99213 OFFICE O/P EST LOW 20 MIN: CPT | Performed by: PHYSICIAN ASSISTANT

## 2021-09-07 NOTE — PROGRESS NOTES
Chief Complaint   Patient presents with   • Hypertension     6 month F/U       Subjective       History of Present Illness     Eric Ramirez is a 40 y.o. male. He presents for follow up of HTN, HLD, Type II DM.     Pt with Type II DM, using insulin as directed. AM fasting glucose 170-200 on average. He denies headaches or foggy-headedness, vision change, abdominal pain, N/V/D, frequent urination or changes in urination or BMs, or neuropathy. No new concerns. Most recent HbA1c of 8.5 in 2021. Follows with Dr. Ledesma in endocrinology.      HTN, taking HCTZ as directed. Occasional home BP checks, last night 118/77 at home.  He denies chest pain, SOA, HA, vision change or swelling of extremities.      HLD on Crestor.  Denies chest pain, HA, fatigue, myalgias or weakness. No new concerns.         The following portions of the patient's history were reviewed and updated as appropriate: allergies, current medications, past medical history, past social history and problem list.    Allergies   Allergen Reactions   • Levofloxacin Itching     Throat itching, eyes watering   • Buspirone Itching   • Flagyl [Metronidazole] Hives and Swelling     Social History     Tobacco Use   • Smoking status: Former Smoker     Packs/day: 1.00     Years: 15.00     Pack years: 15.00     Types: Cigarettes     Quit date:      Years since quittin.7   • Smokeless tobacco: Never Used   Substance Use Topics   • Alcohol use: Yes     Alcohol/week: 2.0 - 3.0 standard drinks     Types: 2 - 3 Cans of beer per week     Comment: social           Current Outpatient Medications:   •  buPROPion XL (WELLBUTRIN XL) 300 MG 24 hr tablet, Take 1 tablet by mouth Daily., Disp: 30 tablet, Rfl: 5  •  Cetirizine HCl (ZYRTEC ALLERGY PO), Take 1 tablet by mouth daily., Disp: , Rfl:   •  FOLIC ACID PO, Take 1 tablet by mouth Daily., Disp: , Rfl:   •  hydroCHLOROthiazide (HYDRODIURIL) 25 MG tablet, Take 1 tablet by mouth Daily., Disp: 30 tablet, Rfl: 11  •   Insulin Glargine (Lantus SoloStar) 100 UNIT/ML injection pen, Inject 5 Units under the skin into the appropriate area as directed Daily., Disp: 3 mL, Rfl: 5  •  Insulin Pen Needle 31G X 5 MM misc, 1 each Daily. Use to inject insulin, Disp: 100 each, Rfl: 11  •  omeprazole (priLOSEC) 20 MG capsule, Take 1 capsule by mouth Daily., Disp: 90 capsule, Rfl: 3  •  ondansetron ODT (ZOFRAN-ODT) 4 MG disintegrating tablet, Place 1 tablet on the tongue Every 8 (Eight) Hours As Needed for Nausea or Vomiting for up to 6 doses., Disp: 6 tablet, Rfl: 0  •  Probiotic Product (PROBIOTIC DAILY PO), Take 1 tablet by mouth Daily., Disp: , Rfl:   •  rosuvastatin (CRESTOR) 10 MG tablet, TAKE ONE TABLET BY MOUTH DAILY, Disp: 30 tablet, Rfl: 5  •  sulfaSALAzine (AZULFIDINE) 500 MG tablet, TAKE FOUR TABLETS BY MOUTH TWICE A DAY, Disp: 240 tablet, Rfl: 3    Review of Systems   Constitutional: Negative for chills and fever.   HENT: Negative for congestion, ear pain and sore throat.    Eyes: Negative for pain.   Respiratory: Negative for cough, shortness of breath and wheezing.    Cardiovascular: Negative for chest pain and palpitations.   Gastrointestinal: Negative for abdominal pain, diarrhea, nausea and vomiting.   Genitourinary: Negative for dysuria and hematuria.   Skin: Negative for rash.   Allergic/Immunologic: Negative for immunocompromised state.   Neurological: Negative for dizziness, weakness, numbness and headache.   Psychiatric/Behavioral: The patient is not nervous/anxious.        Objective   Vitals:    09/07/21 1114   BP: 122/90   Pulse: 94   Resp: 16   Temp: 98 °F (36.7 °C)   SpO2: 95%     Body mass index is 27.32 kg/m².    Physical Exam  Constitutional:       Appearance: Normal appearance. He is well-developed.   HENT:      Head: Normocephalic and atraumatic.      Right Ear: Tympanic membrane, ear canal and external ear normal.      Left Ear: Tympanic membrane, ear canal and external ear normal.      Nose: Nose normal.       Mouth/Throat:      Mouth: Mucous membranes are moist.      Pharynx: Oropharynx is clear.   Eyes:      Conjunctiva/sclera: Conjunctivae normal.   Neck:      Thyroid: No thyromegaly.      Vascular: No carotid bruit.   Cardiovascular:      Rate and Rhythm: Normal rate and regular rhythm.      Heart sounds: No murmur heard.     Pulmonary:      Effort: Pulmonary effort is normal.      Breath sounds: Normal breath sounds. No wheezing or rales.   Abdominal:      Palpations: Abdomen is soft. There is no mass.      Tenderness: There is no abdominal tenderness.   Musculoskeletal:      Cervical back: Neck supple.   Lymphadenopathy:      Cervical: No cervical adenopathy.   Skin:     Findings: No rash.   Psychiatric:         Behavior: Behavior normal.               Assessment/Plan   Diagnoses and all orders for this visit:    1. Diabetes mellitus type 2, uncontrolled, with complications (CMS/HCC) (Primary)  -     POC Glycosylated Hemoglobin (Hb A1C)  - Encouraged pt to follow up with endocrinology for routine monitoring.     2. Essential hypertension  - Continue HCTZ.     3. Other hyperlipidemia  - Continue Crestor.          Return in about 6 months (around 3/7/2022) for Annual physical.

## 2021-09-27 PROBLEM — E78.49 OTHER HYPERLIPIDEMIA: Status: ACTIVE | Noted: 2021-09-27

## 2021-10-22 RX ORDER — HYDROCHLOROTHIAZIDE 25 MG/1
TABLET ORAL
Qty: 30 TABLET | Refills: 11 | Status: SHIPPED | OUTPATIENT
Start: 2021-10-22 | End: 2022-11-18

## 2021-11-24 RX ORDER — ROSUVASTATIN CALCIUM 10 MG/1
10 TABLET, COATED ORAL DAILY
Qty: 30 TABLET | Refills: 5 | Status: SHIPPED | OUTPATIENT
Start: 2021-11-24 | End: 2022-07-27 | Stop reason: SDUPTHER

## 2021-11-29 RX ORDER — ROSUVASTATIN CALCIUM 10 MG/1
10 TABLET, COATED ORAL DAILY
Qty: 30 TABLET | Refills: 5 | OUTPATIENT
Start: 2021-11-29

## 2022-03-08 ENCOUNTER — TELEPHONE (OUTPATIENT)
Dept: ENDOCRINOLOGY | Facility: CLINIC | Age: 42
End: 2022-03-08

## 2022-03-08 ENCOUNTER — OFFICE VISIT (OUTPATIENT)
Dept: ENDOCRINOLOGY | Facility: CLINIC | Age: 42
End: 2022-03-08

## 2022-03-08 VITALS
WEIGHT: 183 LBS | DIASTOLIC BLOOD PRESSURE: 96 MMHG | BODY MASS INDEX: 27.11 KG/M2 | HEIGHT: 69 IN | SYSTOLIC BLOOD PRESSURE: 154 MMHG | HEART RATE: 98 BPM | OXYGEN SATURATION: 95 %

## 2022-03-08 DIAGNOSIS — E11.65 TYPE 2 DIABETES MELLITUS WITH HYPERGLYCEMIA, UNSPECIFIED WHETHER LONG TERM INSULIN USE: Primary | ICD-10-CM

## 2022-03-08 LAB
EXPIRATION DATE: ABNORMAL
EXPIRATION DATE: NORMAL
GLUCOSE BLDC GLUCOMTR-MCNC: 415 MG/DL (ref 70–130)
HBA1C MFR BLD: 9.2 %
Lab: ABNORMAL
Lab: NORMAL

## 2022-03-08 PROCEDURE — 82947 ASSAY GLUCOSE BLOOD QUANT: CPT | Performed by: INTERNAL MEDICINE

## 2022-03-08 PROCEDURE — 83036 HEMOGLOBIN GLYCOSYLATED A1C: CPT | Performed by: INTERNAL MEDICINE

## 2022-03-08 PROCEDURE — 99214 OFFICE O/P EST MOD 30 MIN: CPT | Performed by: INTERNAL MEDICINE

## 2022-03-08 RX ORDER — INSULIN LISPRO 100 [IU]/ML
INJECTION, SOLUTION INTRAVENOUS; SUBCUTANEOUS
Qty: 15 ML | Refills: 3 | Status: SHIPPED | OUTPATIENT
Start: 2022-03-08 | End: 2022-04-05 | Stop reason: SDUPTHER

## 2022-03-08 RX ORDER — INSULIN GLARGINE 100 [IU]/ML
9 INJECTION, SOLUTION SUBCUTANEOUS DAILY
COMMUNITY
End: 2022-03-08

## 2022-03-08 RX ORDER — INSULIN GLARGINE 100 [IU]/ML
INJECTION, SOLUTION SUBCUTANEOUS
Qty: 15 ML | Refills: 3 | Status: SHIPPED | OUTPATIENT
Start: 2022-03-08 | End: 2022-04-05

## 2022-03-08 NOTE — PROGRESS NOTES
"Chief Complaint   Patient presents with   • Diabetes        HPI   Eric Ramirez is a 41 y.o. male had concerns including Diabetes.      Patient presents for follow-up of diabetes, he was last seen in October 2020.  He contacted the office earlier today reporting blood sugars greater than 400 and appointment was scheduled.  He reports that he noticed a significant deterioration in glycemic control after he had COVID in January.  He also reports that he has gone back to working nights and at this irregular schedule has also likely been a contributing factor.  He reports primary care increased his insulin to 9 units a couple of months ago but that recent glucose values have been in the 300s-400s consistently.  Patient is monitoring sporadically.  Patient does report that he feels fatigued.  Denies any acute symptoms such as nausea, vomiting,  dizziness, lightheadedness.  He denies increased thirst or increased urination.    The following portions of the patient's history were reviewed and updated as appropriate: allergies, current medications and past social history.    Review of Systems   Constitutional: Positive for fatigue.   Gastrointestinal: Negative for abdominal pain, nausea and vomiting.   Endocrine: Negative for polydipsia and polyuria.   Neurological: Negative for light-headedness.      /96 (BP Location: Left arm, Patient Position: Sitting, Cuff Size: Adult)   Pulse 98   Ht 175.3 cm (69\")   Wt 83 kg (183 lb)   SpO2 95%   BMI 27.02 kg/m²      Physical Exam      Constitutional:  well developed; well nourished  no acute distress  appears stated age   ENT/Thyroid:  Not examined   Eyes: Conjunctiva: clear   Respiratory:  breathing is unlabored  clear to auscultation bilaterally   Cardiovascular:  regular rate and rhythm   Chest:  Not performed.   Abdomen: soft, non-tender; no masses   : Not performed.   Musculoskeletal: Not performed   Skin: not performed.   Neuro: mental status, speech normal "   Psych: mood and affect are within normal limits       Labs/Imaging   Latest Reference Range & Units 03/08/22 14:32 03/08/22 14:34   Glucose 70 - 130 mg/dL 415 !    Hemoglobin A1C %  9.2   !: Data is abnormal    Diagnoses and all orders for this visit:    1. Type 2 diabetes mellitus with hyperglycemia, unspecified whether long term insulin use (HCC) (Primary)  -Uncontrolled with hemoglobin A1c 9.2%, significantly worsened from previous.  Patient reports deterioration in glycemic control following COVID.  -Glucose 415 during office visit, patient is asymptomatic and hemodynamically stable.  We discussed importance of hydration when glucose levels are elevated.  We discussed that should he develop nausea or vomiting or other acute symptoms in the setting of elevated blood glucose he must go to the ER immediately.  Patient was given a sample of short acting insulin correctional scale for utilization until acute hyperglycemia corrected.  -Patient to increase Lantus to 14 units daily.  He will continue to titrate by 2 units every other day until fasting glucose less than 200.  -Patient to start Humalog report miliary equivalent 3 units with meals plus correction 1 for 50 greater than 200.  He was advised to contact the office if glucose values not significantly improved on this regimen.  We will plan for short-term interval follow-up.  -Patient was advised to monitor blood sugar 3 times daily.  Patient was instructed to bring glucometer to all future appointments. Patient should contact the clinic between appointments with hypoglycemia or persistent hyperglycemia.  Discussed signs and symptoms of hypoglycemia as well as hypoglycemia management via the rule of 15's.  Discussed potential for long-term complications with uncontrolled diabetes including nephropathy, neuropathy, retinopathy, increased risk for cardiac disease.  Discussed the role of diet and exercise in the management of diabetes.  -     POC Glucose,  Blood  -     POC Glycosylated Hemoglobin (Hb A1C)    Other orders  -     Insulin Lispro, 1 Unit Dial, (HumaLOG KwikPen) 100 UNIT/ML solution pen-injector; For use at mealtimes and per correctional scale, MDD 30 units  Dispense: 15 mL; Refill: 3  -     Insulin Glargine (Lantus SoloStar) 100 UNIT/ML injection pen; Start 14 units daily and titrate per instructions, MDD 30 units  Dispense: 15 mL; Refill: 3       No follow-ups on file. The patient was instructed to contact the clinic with any interval questions or concerns.    Jil Ledesma MD   Endocrinologist    Dictated Utilizing Dragon Dictation

## 2022-03-19 DIAGNOSIS — K21.9 GASTROESOPHAGEAL REFLUX DISEASE WITHOUT ESOPHAGITIS: ICD-10-CM

## 2022-03-21 RX ORDER — OMEPRAZOLE 20 MG/1
CAPSULE, DELAYED RELEASE ORAL
Qty: 90 CAPSULE | Refills: 3 | Status: SHIPPED | OUTPATIENT
Start: 2022-03-21

## 2022-03-29 DIAGNOSIS — K51.019 ULCERATIVE PANCOLITIS WITH COMPLICATION: ICD-10-CM

## 2022-03-29 RX ORDER — SULFASALAZINE 500 MG/1
2000 TABLET ORAL 2 TIMES DAILY
Qty: 240 TABLET | Refills: 3 | Status: CANCELLED | OUTPATIENT
Start: 2022-03-29

## 2022-03-29 RX ORDER — SULFASALAZINE 500 MG/1
2000 TABLET ORAL 2 TIMES DAILY
Qty: 240 TABLET | Refills: 3 | Status: SHIPPED | OUTPATIENT
Start: 2022-03-29 | End: 2022-04-04 | Stop reason: SDUPTHER

## 2022-04-04 DIAGNOSIS — K51.019 ULCERATIVE PANCOLITIS WITH COMPLICATION: ICD-10-CM

## 2022-04-05 ENCOUNTER — OFFICE VISIT (OUTPATIENT)
Dept: ENDOCRINOLOGY | Facility: CLINIC | Age: 42
End: 2022-04-05

## 2022-04-05 VITALS
DIASTOLIC BLOOD PRESSURE: 90 MMHG | OXYGEN SATURATION: 94 % | HEART RATE: 100 BPM | SYSTOLIC BLOOD PRESSURE: 138 MMHG | HEIGHT: 69 IN | BODY MASS INDEX: 27.99 KG/M2 | WEIGHT: 189 LBS

## 2022-04-05 DIAGNOSIS — E11.65 TYPE 2 DIABETES MELLITUS WITH HYPERGLYCEMIA, UNSPECIFIED WHETHER LONG TERM INSULIN USE: Primary | ICD-10-CM

## 2022-04-05 DIAGNOSIS — E78.5 HYPERLIPIDEMIA, UNSPECIFIED HYPERLIPIDEMIA TYPE: ICD-10-CM

## 2022-04-05 LAB
EXPIRATION DATE: ABNORMAL
GLUCOSE BLDC GLUCOMTR-MCNC: 411 MG/DL (ref 70–130)
Lab: ABNORMAL

## 2022-04-05 PROCEDURE — 99214 OFFICE O/P EST MOD 30 MIN: CPT | Performed by: INTERNAL MEDICINE

## 2022-04-05 PROCEDURE — 82947 ASSAY GLUCOSE BLOOD QUANT: CPT | Performed by: INTERNAL MEDICINE

## 2022-04-05 RX ORDER — SULFASALAZINE 500 MG/1
2000 TABLET ORAL 2 TIMES DAILY
Qty: 240 TABLET | Refills: 3 | Status: SHIPPED | OUTPATIENT
Start: 2022-04-05

## 2022-04-05 RX ORDER — INSULIN GLARGINE 100 [IU]/ML
INJECTION, SOLUTION SUBCUTANEOUS
Qty: 15 ML | Refills: 3 | Status: SHIPPED | OUTPATIENT
Start: 2022-04-05 | End: 2022-06-13

## 2022-04-05 RX ORDER — INSULIN LISPRO 100 [IU]/ML
INJECTION, SOLUTION INTRAVENOUS; SUBCUTANEOUS
Qty: 15 ML | Refills: 3 | Status: SHIPPED | OUTPATIENT
Start: 2022-04-05

## 2022-04-05 RX ORDER — SULFAMETHOXAZOLE AND TRIMETHOPRIM 800; 160 MG/1; MG/1
1 TABLET ORAL 2 TIMES DAILY
COMMUNITY
End: 2022-05-13

## 2022-04-05 NOTE — PROGRESS NOTES
"Chief Complaint   Patient presents with   • Diabetes        HPI   Eric Ramirez is a 41 y.o. male had concerns including Diabetes.      Patient reports that he has felt much better since initiation of short acting insulin at last visit.  Review of glucometer, he is measuring glucose 2-3 times daily.  Values in the past 5 days ranging .  Of note, blood glucose was profoundly elevated during office visit.  Patient denies any symptoms such as nausea, vomiting, dizziness, lightheadedness.  He reports that value this morning was around 180 and reports that he ate a pop tart for breakfast approximately 1 hour prior to appointment.  He did take 4 units of insulin with breakfast.    Patient is taking rosuvastatin 10 mg daily for hyperlipidemia.  He denies myalgias or abdominal pain.    The following portions of the patient's history were reviewed and updated as appropriate: allergies, current medications and past social history.    Review of Systems   Gastrointestinal: Negative for abdominal pain, nausea and vomiting.   Endocrine: Negative for polydipsia and polyuria.   Musculoskeletal: Negative for myalgias.   Neurological: Negative for dizziness and light-headedness.        /90 (BP Location: Left arm, Patient Position: Sitting, Cuff Size: Adult)   Pulse 100   Ht 175.3 cm (69\")   Wt 85.7 kg (189 lb)   SpO2 94%   BMI 27.91 kg/m²      Physical Exam      Constitutional:  well developed; well nourished  no acute distress   ENT/Thyroid: not examined   Eyes: Conjunctiva: clear   Respiratory:  breathing is unlabored  clear to auscultation bilaterally   Cardiovascular:  regular rate and rhythm   Chest:  Not performed.   Abdomen: soft, non-tender; no masses   : Not performed.   Musculoskeletal: Not performed   Skin: dry and warm   Neuro: mental status, speech normal   Psych: mood and affect are within normal limits       Labs/Imaging   Latest Reference Range & Units 04/05/22 10:50   Glucose 70 - 130 mg/dL 411 " !   !: Data is abnormal     Latest Reference Range & Units 03/08/22 14:34   Hemoglobin A1C % 9.2       Diagnoses and all orders for this visit:    1. Type 2 diabetes mellitus with hyperglycemia, unspecified whether long term insulin use (HCC) (Primary)  -Uncontrolled with most recent hemoglobin A1c 9.2%, too soon to repeat.  -Home glucose data is indicative of improving control with patient is hyperglycemic during office visit.  Patient not symptomatic during visit, deferred administration of insulin as patient administered insulin with breakfast 1 hour prior.  Discussed importance of hydration.  We discussed that should he develop nausea, vomiting or other acute symptoms in the setting of hyperglycemia he must go to ER immediately.  Reviewed need to limit dietary carbohydrates.  -Patient to increase Lantus to 16 units daily  Patient to increase Humalog to 6 units with meals plus correction as needed.  -Patient was advised to monitor blood sugar 3 times daily.  Patient was instructed to bring glucometer to all future appointments. Patient should contact the clinic between appointments with hypoglycemia or persistent hyperglycemia.  Discussed signs and symptoms of hypoglycemia as well as hypoglycemia management via the rule of 15's.  Discussed potential for long-term complications with uncontrolled diabetes including nephropathy, neuropathy, retinopathy, increased risk for cardiac disease.  Discussed the role of diet and exercise in the management of diabetes.  CMP up-to-date from September 2021  Update remainder of screening labs next visit  -     POC Glucose, Blood    2. Hyperlipidemia, unspecified hyperlipidemia type  Continue rosuvastatin    Return in about 3 months (around 7/5/2022) for Next scheduled follow up. The patient was instructed to contact the clinic with any interval questions or concerns.    Jil Ledesma MD   Endocrinologist    Dictated Utilizing Dragon Dictation

## 2022-05-02 RX ORDER — BUPROPION HYDROCHLORIDE 300 MG/1
TABLET ORAL
Qty: 90 TABLET | Refills: 0 | Status: SHIPPED | OUTPATIENT
Start: 2022-05-02 | End: 2022-08-03

## 2022-05-13 ENCOUNTER — OFFICE VISIT (OUTPATIENT)
Dept: INTERNAL MEDICINE | Facility: CLINIC | Age: 42
End: 2022-05-13

## 2022-05-13 VITALS
RESPIRATION RATE: 20 BRPM | HEART RATE: 109 BPM | TEMPERATURE: 98.6 F | DIASTOLIC BLOOD PRESSURE: 82 MMHG | OXYGEN SATURATION: 98 % | WEIGHT: 189.4 LBS | BODY MASS INDEX: 28.7 KG/M2 | HEIGHT: 68 IN | SYSTOLIC BLOOD PRESSURE: 136 MMHG

## 2022-05-13 DIAGNOSIS — Z79.4 TYPE 2 DIABETES MELLITUS WITH DIABETIC AUTONOMIC NEUROPATHY, WITH LONG-TERM CURRENT USE OF INSULIN: ICD-10-CM

## 2022-05-13 DIAGNOSIS — Z12.5 SCREENING FOR MALIGNANT NEOPLASM OF PROSTATE: ICD-10-CM

## 2022-05-13 DIAGNOSIS — E78.49 OTHER HYPERLIPIDEMIA: ICD-10-CM

## 2022-05-13 DIAGNOSIS — I10 PRIMARY HYPERTENSION: ICD-10-CM

## 2022-05-13 DIAGNOSIS — E11.43 TYPE 2 DIABETES MELLITUS WITH DIABETIC AUTONOMIC NEUROPATHY, WITH LONG-TERM CURRENT USE OF INSULIN: ICD-10-CM

## 2022-05-13 DIAGNOSIS — Z00.00 ENCOUNTER FOR HEALTH MAINTENANCE EXAMINATION: Primary | ICD-10-CM

## 2022-05-13 PROCEDURE — 99396 PREV VISIT EST AGE 40-64: CPT | Performed by: PHYSICIAN ASSISTANT

## 2022-05-13 NOTE — PROGRESS NOTES
Chief Complaint   Patient presents with   • Annual Exam       Subjective       History of Present Illness     Eric Ramirez is a 41 y.o. male. He presents for his annual exam.     The patient had an increase in his A1c, he was seen by his endocrinologist and is now using a long and short acting insulin. He reports his blood glucose this morning was 168, he notes they were previously closer to 500. The patient reports weight gain due to the insulin and working night shift. He states he does not eat the healthiest while on nightshift due to the limited access to food. The patient is still followed by Dr. Ledesma, he has an appointment on 06/13/2022. She reports a great improvement in his glucose.  He notes if his glucose reaches 90, he gets the shakes and feels faint. He reports 130-140 is his typical range in the mornings. The patient states his glucose was 295 prior to his appointment today, he thinks he may have missed an injection. The patient has swelling in his calves and ankles, he states he has a history of low sodium.    The patient reports only sleeping 4 hours a day, he gets off of work at 6:00AM. He complains of fatigue.     The patient states he is no longer following with Dr. Antonio. He states still follows with Dr. Montano. The patient takes folic acid and a probiotic. He was evaluated by an ophthalmologist in 03/2021, he denies any change to his vision. He denies being seen by a dentist within the last year. He denies any depression or anxiety, he states he enjoys work and life is good.     The patient notes his blood pressure was down today.     The patient reports his gastric issues are doing well. He denies any tenderness in the abdomen.     The patient reports he could not close his hands on 05/11/2022. He reports intermittent swelling in his hands, he notes it takes about 2 days to resolve. He takes Aleve as needed.     The patient denies any new skin changes.       Are you currently seeing  any other doctors or specialists? Dr. Ledesma-- endocrinology; Dr. Montano-- gastroenterology    Are you currently taking any OTC medications or herbal medications? As noted in med list      Sleep: 4 hours/ night   Diet: fairly healthy, per pt   Exercise: walking, body weight exercises 3-4 days/ week     Most recent colonoscopy: 7/28/2016  Regular dental visits: No, not UTD   Regular eye exams: Yes, UTD         The following portions of the patient's history were reviewed and updated as appropriate: allergies, current medications, past medical history, past social history, past surgical history and problem list.    Allergies   Allergen Reactions   • Levofloxacin Itching     Throat itching, eyes watering   • Buspirone Itching   • Flagyl [Metronidazole] Hives and Swelling     Social History     Tobacco Use   • Smoking status: Former Smoker     Packs/day: 1.00     Years: 15.00     Pack years: 15.00     Types: Cigarettes     Quit date: 2012     Years since quitting: 10.3   • Smokeless tobacco: Never Used   Substance Use Topics   • Alcohol use: Yes     Alcohol/week: 2.0 - 3.0 standard drinks     Types: 2 - 3 Cans of beer per week     Comment: social       Past Surgical History:   Procedure Laterality Date   • CHOLECYSTECTOMY     • COLONOSCOPY  07/28/16By Dr. Luci Nelson     Family History   Problem Relation Age of Onset   • Hypertension Mother    • Melanoma Father    • Heart disease Father    • Scleroderma Sister    • Pulmonary fibrosis Sister    • Pyloric stenosis Sister    • Colon polyps Neg Hx    • Colon cancer Neg Hx          Current Outpatient Medications:   •  buPROPion XL (WELLBUTRIN XL) 300 MG 24 hr tablet, TAKE ONE TABLET BY MOUTH DAILY, Disp: 90 tablet, Rfl: 0  •  Cetirizine HCl (ZYRTEC ALLERGY PO), Take 1 tablet by mouth daily., Disp: , Rfl:   •  FOLIC ACID PO, Take 1 tablet by mouth Daily., Disp: , Rfl:   •  hydroCHLOROthiazide (HYDRODIURIL) 25 MG tablet, TAKE ONE TABLET BY MOUTH DAILY, Disp: 30 tablet,  Rfl: 11  •  Insulin Glargine (Lantus SoloStar) 100 UNIT/ML injection pen, 16 units daily, Disp: 15 mL, Rfl: 3  •  Insulin Lispro, 1 Unit Dial, (HumaLOG KwikPen) 100 UNIT/ML solution pen-injector, For use at mealtimes and per correctional scale, MDD 30 units, Disp: 15 mL, Rfl: 3  •  Insulin Pen Needle 31G X 5 MM misc, 1 each Daily. Use to inject insulin, Disp: 100 each, Rfl: 11  •  omeprazole (priLOSEC) 20 MG capsule, TAKE ONE CAPSULE BY MOUTH DAILY, Disp: 90 capsule, Rfl: 3  •  ondansetron ODT (ZOFRAN-ODT) 4 MG disintegrating tablet, Place 1 tablet on the tongue Every 8 (Eight) Hours As Needed for Nausea or Vomiting for up to 6 doses., Disp: 6 tablet, Rfl: 0  •  Probiotic Product (PROBIOTIC DAILY PO), Take 1 tablet by mouth Daily., Disp: , Rfl:   •  rosuvastatin (CRESTOR) 10 MG tablet, Take 1 tablet by mouth Daily. Patient needs updated lab work!, Disp: 30 tablet, Rfl: 5  •  sulfaSALAzine (AZULFIDINE) 500 MG tablet, Take 4 tablets by mouth 2 (Two) Times a Day., Disp: 240 tablet, Rfl: 3    Patient Active Problem List   Diagnosis   • Generalized anxiety disorder   • Thrombocytopenia (HCC)   • Ulcerative colitis (HCC)   • HTN (hypertension)   • Joint pain   • Immunosuppressed status (HCC)   • Crohn's disease (HCC)   • IBS (irritable bowel syndrome)   • Migraine   • Elevated liver enzymes   • High risk for colon cancer   • Family history of scleroderma   • Type 2 diabetes mellitus with diabetic autonomic neuropathy, with long-term current use of insulin (HCC)   • Abnormal gait   • Deviated nasal septum   • Gastroesophageal reflux disease without esophagitis   • Inflammatory bowel arthritis   • Pancreatitis   • Sensorineural hearing loss   • Lung nodule   • Closed fracture of body of sternum   • Other hyperlipidemia       Review of Systems   Constitutional: Positive for fatigue. Negative for chills and fever.   HENT: Negative for congestion, ear pain, sore throat, swollen glands and trouble swallowing.    Eyes: Negative  for pain, redness and visual disturbance.   Respiratory: Negative for cough, shortness of breath and wheezing.    Cardiovascular: Negative for chest pain and palpitations.   Gastrointestinal: Negative for abdominal pain, blood in stool, diarrhea, nausea and vomiting.   Endocrine: Negative for cold intolerance and heat intolerance.   Genitourinary: Negative for difficulty urinating, dysuria and testicular pain.   Musculoskeletal: Negative for back pain.   Neurological: Negative for dizziness, syncope, weakness, numbness and headache.   Hematological: Does not bruise/bleed easily.   Psychiatric/Behavioral: Negative for self-injury and depressed mood. The patient is not nervous/anxious.        Objective   Vitals:    05/13/22 1311   BP: 136/82   Pulse: 109   Resp: 20   Temp: 98.6 °F (37 °C)   SpO2: 98%     Body mass index is 28.8 kg/m².    Physical Exam  Vitals reviewed.   Constitutional:       Appearance: He is well-developed.   HENT:      Head: Normocephalic and atraumatic.      Right Ear: Tympanic membrane, ear canal and external ear normal. No tenderness.      Left Ear: Tympanic membrane, ear canal and external ear normal. No tenderness.      Nose: Nose normal. No congestion.      Mouth/Throat:      Mouth: Mucous membranes are moist. No oral lesions.      Pharynx: Oropharynx is clear.   Eyes:      General: No scleral icterus.     Conjunctiva/sclera: Conjunctivae normal.   Neck:      Thyroid: No thyromegaly.      Vascular: No carotid bruit.   Cardiovascular:      Rate and Rhythm: Normal rate and regular rhythm.      Pulses: Normal pulses.           Radial pulses are 2+ on the right side and 2+ on the left side.        Dorsalis pedis pulses are 2+ on the right side and 2+ on the left side.      Heart sounds: Normal heart sounds. No murmur heard.    No gallop.   Pulmonary:      Effort: Pulmonary effort is normal.      Breath sounds: Normal breath sounds. No wheezing or rales.   Chest:      Chest wall: No deformity.    Abdominal:      General: Bowel sounds are normal.      Palpations: Abdomen is soft. There is no mass.      Tenderness: There is no abdominal tenderness. Negative signs include Hayward's sign.   Musculoskeletal:         General: No tenderness or deformity. Normal range of motion.      Cervical back: Normal range of motion and neck supple.   Lymphadenopathy:      Cervical: No cervical adenopathy.   Skin:     General: Skin is warm and dry.      Findings: No erythema or rash.      Comments: No atypical nevi.    Neurological:      Mental Status: He is alert and oriented to person, place, and time.   Psychiatric:         Behavior: Behavior normal.               Assessment & Plan   Diagnoses and all orders for this visit:    1. Encounter for health maintenance examination (Primary)  -     CBC & Differential; Future  -     Comprehensive Metabolic Panel; Future  -     Lipid Panel; Future  -     PSA Screen; Future    2. Primary hypertension  -     CBC & Differential; Future  -     Comprehensive Metabolic Panel; Future    3. Other hyperlipidemia  -     Lipid Panel; Future    4. Type 2 diabetes mellitus with diabetic autonomic neuropathy, with long-term current use of insulin (HCC)  -     CBC & Differential; Future  -     Comprehensive Metabolic Panel; Future    5. Screening for malignant neoplasm of prostate  -     PSA Screen; Future        Dr. Hinojosa-- eye exam     1. Diabetes  - Recommend patient monitor his diet  - Continue on insulin  - Continue follow up with Dr. Ledesma  - Monitor blood glucose at home  2. Health maintenance  - Check routine lab work  3. Edema  - Monitor    Follow up in 6 month.         Patient education discussed during this visit:  - avoidance of texting while driving and the need for wearing seatbelt  - use of sunscreen  - healthy sleep habits and appropriate amount of sleep  - H2O consumption, well-balanced diet  - exercise routine which includes at least 150 minutes of cardio per week + muscle  strengthening exercises  - immunizations including annual flu vaccination      Return in about 6 months (around 11/13/2022) for Follow up.   Transcribed from ambient dictation for Henrietta Nugent PA-C by Yajaira Edgar.  05/13/22   14:43 EDT    Patient verbalized consent to the visit recording.

## 2022-05-31 RX ORDER — ROSUVASTATIN CALCIUM 10 MG/1
TABLET, COATED ORAL
Qty: 30 TABLET | Refills: 5 | OUTPATIENT
Start: 2022-05-31

## 2022-06-13 ENCOUNTER — OFFICE VISIT (OUTPATIENT)
Dept: ENDOCRINOLOGY | Facility: CLINIC | Age: 42
End: 2022-06-13

## 2022-06-13 VITALS
BODY MASS INDEX: 28.58 KG/M2 | SYSTOLIC BLOOD PRESSURE: 136 MMHG | OXYGEN SATURATION: 95 % | HEART RATE: 90 BPM | HEIGHT: 69 IN | WEIGHT: 193 LBS | DIASTOLIC BLOOD PRESSURE: 88 MMHG

## 2022-06-13 DIAGNOSIS — E78.49 OTHER HYPERLIPIDEMIA: ICD-10-CM

## 2022-06-13 DIAGNOSIS — E11.649 TYPE 2 DIABETES MELLITUS WITH HYPOGLYCEMIA WITHOUT COMA, WITH LONG-TERM CURRENT USE OF INSULIN: Primary | ICD-10-CM

## 2022-06-13 DIAGNOSIS — Z79.4 TYPE 2 DIABETES MELLITUS WITH HYPOGLYCEMIA WITHOUT COMA, WITH LONG-TERM CURRENT USE OF INSULIN: Primary | ICD-10-CM

## 2022-06-13 LAB
EXPIRATION DATE: ABNORMAL
EXPIRATION DATE: NORMAL
GLUCOSE BLDC GLUCOMTR-MCNC: 169 MG/DL (ref 70–130)
HBA1C MFR BLD: 6.1 %
Lab: ABNORMAL
Lab: NORMAL

## 2022-06-13 PROCEDURE — 83036 HEMOGLOBIN GLYCOSYLATED A1C: CPT | Performed by: INTERNAL MEDICINE

## 2022-06-13 PROCEDURE — 99214 OFFICE O/P EST MOD 30 MIN: CPT | Performed by: INTERNAL MEDICINE

## 2022-06-13 PROCEDURE — 82947 ASSAY GLUCOSE BLOOD QUANT: CPT | Performed by: INTERNAL MEDICINE

## 2022-06-13 RX ORDER — INSULIN GLARGINE 100 [IU]/ML
INJECTION, SOLUTION SUBCUTANEOUS
Qty: 15 ML | Refills: 3 | Status: SHIPPED | OUTPATIENT
Start: 2022-06-13

## 2022-06-13 NOTE — PROGRESS NOTES
"Chief Complaint   Patient presents with   • Diabetes        HPI   Eric Ramirez is a 41 y.o. male had concerns including Diabetes.      Patient reports feeling well in the interim since his last visit.  He reports that he feels much better now that sugars are under better control.  He reports he is currently taking Lantus 16 units daily and Humalog 5 units with meals.  He is rarely needing to add correctional insulin.  He does report a single instance of glucose value less than 70 which occurred he was out working in the yard and had not eaten in several hours.    Patient reports he is no longer taking rosuvastatin 10 mg daily, he believes he simply ran out of refills on this.  He does report he is due to have screening labs with his PCP which are already ordered.    The following portions of the patient's history were reviewed and updated as appropriate: allergies, current medications and past social history.    Review of Systems   Constitutional: Negative for unexpected weight gain and unexpected weight loss.   Cardiovascular: Negative for palpitations.   Gastrointestinal: Negative for constipation and diarrhea.   Endocrine: Negative for polydipsia and polyuria.        /88 (BP Location: Right arm, Patient Position: Sitting, Cuff Size: Adult)   Pulse 90   Ht 175.3 cm (69\")   Wt 87.5 kg (193 lb)   SpO2 95%   BMI 28.50 kg/m²      Physical Exam      Constitutional:  well developed; well nourished  no acute distress  appears stated age   ENT/Thyroid: not examined   Eyes: Conjunctiva: clear   Respiratory:  breathing is unlabored  clear to auscultation bilaterally   Cardiovascular:  regular rate and rhythm   Chest:  Not performed.   Abdomen: soft, non-tender; no masses   : Not performed.   Musculoskeletal: Not performed   Skin: not performed.   Neuro: mental status, speech normal   Psych: mood and affect are within normal limits       Labs/Imaging   Latest Reference Range & Units 06/13/22 14:38   Glucose " 70 - 130 mg/dL 169 !   Hemoglobin A1C % 6.1   !: Data is abnormal    Diagnoses and all orders for this visit:    1. Type 2 diabetes mellitus with hypoglycemia without coma, with long-term current use of insulin (HCC) (Primary)  -A1c at goal, 6.1 percent  -Patient reports hypoglycemia with fasting/increased activity  Plan to reduce Lantus to 14 units  Plan to continue Humalog 5 units with meals plus correction 1 for 50 greater than 150  Patient was advised to monitor blood sugar 3 times daily.  Patient was instructed to bring glucometer to all future appointments. Patient should contact the clinic between appointments with hypoglycemia or persistent hyperglycemia.  Discussed signs and symptoms of hypoglycemia as well as hypoglycemia management via the rule of 15's.  Discussed potential for long-term complications with uncontrolled diabetes including nephropathy, neuropathy, retinopathy, increased risk for cardiac disease.  Discussed the role of diet and exercise in the management of diabetes.  Urine microalbumin ordered  -     POC Glucose, Blood  -     POC Glycosylated Hemoglobin (Hb A1C)  -     Microalbumin / Creatinine Urine Ratio - Urine, Clean Catch; Future    2. Other hyperlipidemia  Patient reports he is not currently taking statin therapy  Patient has pending orders for lipid panel with PCP  Reviewed role of statin therapy and diabetes      Return in about 4 months (around 10/13/2022) for Next scheduled follow up. The patient was instructed to contact the clinic with any interval questions or concerns.    Jil Ledesma MD   Endocrinologist    Dictated Utilizing Dragon Dictation

## 2022-07-19 ENCOUNTER — TELEPHONE (OUTPATIENT)
Dept: INTERNAL MEDICINE | Facility: CLINIC | Age: 42
End: 2022-07-19

## 2022-07-27 ENCOUNTER — OFFICE VISIT (OUTPATIENT)
Dept: CARDIOLOGY | Facility: CLINIC | Age: 42
End: 2022-07-27

## 2022-07-27 VITALS
BODY MASS INDEX: 28.32 KG/M2 | SYSTOLIC BLOOD PRESSURE: 140 MMHG | DIASTOLIC BLOOD PRESSURE: 90 MMHG | HEIGHT: 69 IN | WEIGHT: 191.2 LBS | HEART RATE: 62 BPM | OXYGEN SATURATION: 97 %

## 2022-07-27 DIAGNOSIS — R06.09 DYSPNEA ON EXERTION: Primary | ICD-10-CM

## 2022-07-27 PROCEDURE — 99214 OFFICE O/P EST MOD 30 MIN: CPT | Performed by: INTERNAL MEDICINE

## 2022-07-27 RX ORDER — LISINOPRIL 10 MG/1
10 TABLET ORAL DAILY
Qty: 30 TABLET | Refills: 11 | Status: SHIPPED | OUTPATIENT
Start: 2022-07-27 | End: 2022-07-27 | Stop reason: ALTCHOICE

## 2022-07-27 RX ORDER — LOSARTAN POTASSIUM 50 MG/1
50 TABLET ORAL DAILY
Qty: 30 TABLET | Refills: 6 | Status: SHIPPED | OUTPATIENT
Start: 2022-07-27

## 2022-07-27 RX ORDER — ROSUVASTATIN CALCIUM 10 MG/1
10 TABLET, COATED ORAL DAILY
Qty: 30 TABLET | Refills: 5 | Status: SHIPPED | OUTPATIENT
Start: 2022-07-27 | End: 2023-01-26
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
## 2022-08-03 RX ORDER — BUPROPION HYDROCHLORIDE 300 MG/1
TABLET ORAL
Qty: 90 TABLET | Refills: 0 | Status: SHIPPED | OUTPATIENT
Start: 2022-08-03 | End: 2022-10-25 | Stop reason: SDUPTHER

## 2022-09-09 ENCOUNTER — HOSPITAL ENCOUNTER (OUTPATIENT)
Dept: CARDIOLOGY | Facility: HOSPITAL | Age: 42
Discharge: HOME | End: 2022-09-09
Admitting: INTERNAL MEDICINE

## 2022-09-09 VITALS
HEIGHT: 69 IN | DIASTOLIC BLOOD PRESSURE: 100 MMHG | SYSTOLIC BLOOD PRESSURE: 160 MMHG | WEIGHT: 191 LBS | BODY MASS INDEX: 28.29 KG/M2 | HEART RATE: 80 BPM

## 2022-09-09 DIAGNOSIS — R06.09 DYSPNEA ON EXERTION: ICD-10-CM

## 2022-09-09 LAB
BH CV REST NUCLEAR ISOTOPE DOSE: 9.7 MCI
BH CV STRESS BP STAGE 1: NORMAL
BH CV STRESS BP STAGE 2: NORMAL
BH CV STRESS BP STAGE 3: NORMAL
BH CV STRESS DURATION MIN STAGE 1: 3
BH CV STRESS DURATION MIN STAGE 2: 3
BH CV STRESS DURATION MIN STAGE 3: 3
BH CV STRESS DURATION MIN STAGE 4: 1
BH CV STRESS DURATION SEC STAGE 1: 0
BH CV STRESS DURATION SEC STAGE 2: 0
BH CV STRESS DURATION SEC STAGE 3: 0
BH CV STRESS DURATION SEC STAGE 4: 0
BH CV STRESS GRADE STAGE 1: 10
BH CV STRESS GRADE STAGE 2: 12
BH CV STRESS GRADE STAGE 3: 14
BH CV STRESS GRADE STAGE 4: 16
BH CV STRESS HR STAGE 1: 117
BH CV STRESS HR STAGE 2: 141
BH CV STRESS HR STAGE 3: 162
BH CV STRESS HR STAGE 4: 176
BH CV STRESS METS STAGE 1: 5
BH CV STRESS METS STAGE 2: 7.5
BH CV STRESS METS STAGE 3: 10
BH CV STRESS METS STAGE 4: 13.5
BH CV STRESS NUCLEAR ISOTOPE DOSE: 30.2 MCI
BH CV STRESS O2 STAGE 1: 97
BH CV STRESS O2 STAGE 2: 94
BH CV STRESS O2 STAGE 3: 93
BH CV STRESS O2 STAGE 4: 96
BH CV STRESS PROTOCOL 1: NORMAL
BH CV STRESS RECOVERY BP: NORMAL MMHG
BH CV STRESS RECOVERY HR: 114 BPM
BH CV STRESS RECOVERY O2: 96 %
BH CV STRESS SPEED STAGE 1: 1.7
BH CV STRESS SPEED STAGE 2: 2.5
BH CV STRESS SPEED STAGE 3: 3.4
BH CV STRESS SPEED STAGE 4: 4.2
BH CV STRESS STAGE 1: 1
BH CV STRESS STAGE 2: 2
BH CV STRESS STAGE 3: 3
BH CV STRESS STAGE 4: 4
LV EF NUC BP: 81 %
MAXIMAL PREDICTED HEART RATE: 177 BPM
PERCENT MAX PREDICTED HR: 99.44 %
STRESS BASELINE BP: NORMAL MMHG
STRESS BASELINE HR: 80 BPM
STRESS O2 SAT REST: 98 %
STRESS PERCENT HR: 117 %
STRESS POST ESTIMATED WORKLOAD: 11.9 METS
STRESS POST EXERCISE DUR MIN: 10 MIN
STRESS POST EXERCISE DUR SEC: 0 SEC
STRESS POST O2 SAT PEAK: 96 %
STRESS POST PEAK BP: NORMAL MMHG
STRESS POST PEAK HR: 176 BPM
STRESS TARGET HR: 150 BPM

## 2022-09-09 PROCEDURE — 93017 CV STRESS TEST TRACING ONLY: CPT

## 2022-09-09 PROCEDURE — 93018 CV STRESS TEST I&R ONLY: CPT | Performed by: INTERNAL MEDICINE

## 2022-09-09 PROCEDURE — 0 TECHNETIUM SESTAMIBI: Performed by: INTERNAL MEDICINE

## 2022-09-09 PROCEDURE — 78452 HT MUSCLE IMAGE SPECT MULT: CPT | Performed by: INTERNAL MEDICINE

## 2022-09-09 PROCEDURE — 78452 HT MUSCLE IMAGE SPECT MULT: CPT

## 2022-09-09 PROCEDURE — A9500 TC99M SESTAMIBI: HCPCS | Performed by: INTERNAL MEDICINE

## 2022-09-09 RX ADMIN — TECHNETIUM TC 99M SESTAMIBI 1 DOSE: 1 INJECTION INTRAVENOUS at 11:20

## 2022-09-09 RX ADMIN — TECHNETIUM TC 99M SESTAMIBI 1 DOSE: 1 INJECTION INTRAVENOUS at 13:50

## 2022-09-12 ENCOUNTER — TELEPHONE (OUTPATIENT)
Dept: CARDIOLOGY | Facility: CLINIC | Age: 42
End: 2022-09-12

## 2022-09-12 NOTE — TELEPHONE ENCOUNTER
----- Message from John Sommers MD sent at 9/9/2022  7:40 PM EDT -----  Can we let patient know that his stress test did not show any evidence of significant heart artery disease.  Overall reassuring study.

## 2022-10-25 RX ORDER — BUPROPION HYDROCHLORIDE 300 MG/1
300 TABLET ORAL DAILY
Qty: 90 TABLET | Refills: 0 | Status: SHIPPED | OUTPATIENT
Start: 2022-10-25 | End: 2022-10-28 | Stop reason: SDUPTHER

## 2022-10-25 RX ORDER — BUPROPION HYDROCHLORIDE 300 MG/1
300 TABLET ORAL DAILY
Qty: 90 TABLET | Refills: 0 | Status: SHIPPED | OUTPATIENT
Start: 2022-10-25 | End: 2022-10-25

## 2022-10-25 NOTE — TELEPHONE ENCOUNTER
Caller: Yolanda Ramirez    Relationship: Self    Best call back number: [phone]    Requested Prescriptions:   Requested Prescriptions     Pending Prescriptions Disp Refills   • buPROPion XL (WELLBUTRIN XL) 300 MG 24 hr tablet 90 tablet 0     Sig: Take 1 tablet by mouth Daily.        Pharmacy where request should be sent: [facility] [id number] [address] [phone] [facility] [phone] FX     Additional details provided by patient: YOLANDA SAYS HE HAS 1 PILL LEFT.  HE HAS AN APPOINTMENT [id number] WITH [facility].    Does the patient have less than a 3 day supply:  [x] Yes  [] No    Darron Hill Rep   10/25/22 12:31 EDT

## 2022-10-28 RX ORDER — BUPROPION HYDROCHLORIDE 300 MG/1
300 TABLET ORAL DAILY
Qty: 90 TABLET | Refills: 0 | Status: SHIPPED | OUTPATIENT
Start: 2022-10-28 | End: 2023-01-26

## 2022-10-28 NOTE — TELEPHONE ENCOUNTER
Caller: Eric Ramirez    Relationship: Self    Best call back number: [phone]    What is the best time to reach you: ANY TIME    Who are you requesting to speak with (clinical staff, provider,  specific staff member): NURSE    Do you know the name of the person who called: SELF    What was the call regarding: REFILL THAT WAS SENT ON 10/25/2022 FOR BUPROPION NEVER MADE IT TO PHARMACY. LOOKS LIKE IT WAS PRINTED INSTEAD OF SENT. PLEASE SEND ASAP SINCE PATIENT HAS BEEN WITHOUT FOR A FEW DAYS. CALL PATIENT BACK WENT HIS HAS BEEN SENT.    Do you require a callback: YES

## 2022-11-01 ENCOUNTER — OFFICE VISIT (OUTPATIENT)
Dept: ENDOCRINOLOGY | Facility: CLINIC | Age: 42
End: 2022-11-01

## 2022-11-01 VITALS
OXYGEN SATURATION: 97 % | BODY MASS INDEX: 29.03 KG/M2 | WEIGHT: 196 LBS | HEIGHT: 69 IN | SYSTOLIC BLOOD PRESSURE: 130 MMHG | HEART RATE: 96 BPM | DIASTOLIC BLOOD PRESSURE: 84 MMHG

## 2022-11-01 DIAGNOSIS — Z79.4 TYPE 2 DIABETES MELLITUS WITHOUT COMPLICATION, WITH LONG-TERM CURRENT USE OF INSULIN: Primary | ICD-10-CM

## 2022-11-01 DIAGNOSIS — R53.83 OTHER FATIGUE: ICD-10-CM

## 2022-11-01 DIAGNOSIS — E11.9 TYPE 2 DIABETES MELLITUS WITHOUT COMPLICATION, WITH LONG-TERM CURRENT USE OF INSULIN: Primary | ICD-10-CM

## 2022-11-01 DIAGNOSIS — E78.49 OTHER HYPERLIPIDEMIA: ICD-10-CM

## 2022-11-01 LAB
EXPIRATION DATE: ABNORMAL
EXPIRATION DATE: NORMAL
GLUCOSE BLDC GLUCOMTR-MCNC: 160 MG/DL (ref 70–130)
HBA1C MFR BLD: 6.4 %
Lab: ABNORMAL
Lab: NORMAL

## 2022-11-01 PROCEDURE — 83036 HEMOGLOBIN GLYCOSYLATED A1C: CPT | Performed by: INTERNAL MEDICINE

## 2022-11-01 PROCEDURE — 84402 ASSAY OF FREE TESTOSTERONE: CPT | Performed by: INTERNAL MEDICINE

## 2022-11-01 PROCEDURE — 99214 OFFICE O/P EST MOD 30 MIN: CPT | Performed by: INTERNAL MEDICINE

## 2022-11-01 PROCEDURE — 82043 UR ALBUMIN QUANTITATIVE: CPT | Performed by: INTERNAL MEDICINE

## 2022-11-01 PROCEDURE — 84403 ASSAY OF TOTAL TESTOSTERONE: CPT | Performed by: INTERNAL MEDICINE

## 2022-11-01 PROCEDURE — 84443 ASSAY THYROID STIM HORMONE: CPT | Performed by: INTERNAL MEDICINE

## 2022-11-01 PROCEDURE — 80061 LIPID PANEL: CPT | Performed by: INTERNAL MEDICINE

## 2022-11-01 PROCEDURE — 80053 COMPREHEN METABOLIC PANEL: CPT | Performed by: INTERNAL MEDICINE

## 2022-11-01 PROCEDURE — 82570 ASSAY OF URINE CREATININE: CPT | Performed by: INTERNAL MEDICINE

## 2022-11-01 PROCEDURE — 82947 ASSAY GLUCOSE BLOOD QUANT: CPT | Performed by: INTERNAL MEDICINE

## 2022-11-01 NOTE — PROGRESS NOTES
"Chief Complaint   Patient presents with   • Diabetes        HPI   Eric Ramirez is a 41 y.o. male had concerns including Diabetes.      Patient reports diabetes has been reasonably controlled in the interim since last visit.  He has not had any hypoglycemia since last visit.  He does report some glycemic variation which is generally related to diet.  He reports his largest concern today is worried about potential low testosterone.  Patient reports fatigue, weight gain, low libido.  He has not had testosterone measured previously.    Current diabetes medications include the following:  Lantus 14 units  Humalog 5 units with meals plus correction 1 per 50 greater than 150    Patient continues on rosuvastatin 10 mg daily.  He did not complete interval lipid panel.    The following portions of the patient's history were reviewed and updated as appropriate: allergies, current medications and past social history.    Review of Systems   Constitutional: Positive for fatigue and unexpected weight gain.   Gastrointestinal: Negative for nausea and vomiting.   Endocrine: Negative for polydipsia and polyuria.   Genitourinary: Positive for decreased libido.   Musculoskeletal: Negative for myalgias.      /84   Pulse 96   Ht 175.3 cm (69\")   Wt 88.9 kg (196 lb)   SpO2 97%   BMI 28.94 kg/m²      Physical Exam      Constitutional:  well developed; well nourished  no acute distress  appears stated age   ENT/Thyroid: not examined   Eyes: Conjunctiva: clear   Respiratory:  breathing is unlabored  clear to auscultation bilaterally   Cardiovascular:  regular rate and rhythm   Chest:  Not performed.   Abdomen: soft, non-tender; no masses   : Not performed.   Musculoskeletal: Not performed   Skin: not performed.   Neuro: mental status, speech normal   Psych: mood and affect are within normal limits       Labs/Imaging   Latest Reference Range & Units 11/01/22 15:17 11/01/22 15:24   Glucose 70 - 130 mg/dL 160 !    Hemoglobin " A1C %  6.4   !: Data is abnormal    Diagnoses and all orders for this visit:    1. Type 2 diabetes mellitus without complication, with long-term current use of insulin (HCC) (Primary)  Well-controlled with A1c 6.4%  Continue Lantus 14 units daily  Continue Humalog 5 units with meals plus correction 1 per 50 greater than 150  Patient was advised to monitor blood sugar3 times daily.  Patient was instructed to bring glucometer to all future appointments. Patient should contact the clinic between appointments with hypoglycemia or persistent hyperglycemia.  Discussed signs and symptoms of hypoglycemia as well as hypoglycemia management via the rule of 15's.  Discussed potential for long-term complications with uncontrolled diabetes including nephropathy, neuropathy, retinopathy, increased risk for cardiac disease.  Discussed the role of diet and exercise in the management of diabetes.  Update screening labs today  -     POC Glucose, Blood  -     POC Glycosylated Hemoglobin (Hb A1C)  -     Microalbumin / Creatinine Urine Ratio - Urine, Clean Catch; Future  -     Lipid Panel; Future  -     Comprehensive Metabolic Panel; Future  -     Microalbumin / Creatinine Urine Ratio - Urine, Clean Catch  -     Lipid Panel  -     Comprehensive Metabolic Panel    2. Other fatigue  Discussed with patient that fatigue is a nonspecific and frequently multifactorial complaint.  Patient is working night shifts and typically sleeps only 4 to 5 hours per night.  We discussed potential impact of this on energy level.  Check TSH and testosterone to evaluate for endocrine causes of fatigue.  -     TSH; Future  -     Testosterone, F Eqlib & T LC / MS; Future  -     TSH  -     Testosterone, F Eqlib & T LC / MS    3. Other hyperlipidemia  Updating lipid panel today  Continue rosuvastatin 10 mg daily       No follow-ups on file. The patient was instructed to contact the clinic with any interval questions or concerns.    Jil Ldeesma MD    Endocrinologist    Dictated Utilizing Dragon Dictation

## 2022-11-02 LAB
ALBUMIN SERPL-MCNC: 4.8 G/DL (ref 3.5–5.2)
ALBUMIN UR-MCNC: <1.2 MG/DL
ALBUMIN/GLOB SERPL: 1.8 G/DL
ALP SERPL-CCNC: 112 U/L (ref 39–117)
ALT SERPL W P-5'-P-CCNC: 42 U/L (ref 1–41)
ANION GAP SERPL CALCULATED.3IONS-SCNC: 9 MMOL/L (ref 5–15)
AST SERPL-CCNC: 29 U/L (ref 1–40)
BILIRUB SERPL-MCNC: 0.5 MG/DL (ref 0–1.2)
BUN SERPL-MCNC: 12 MG/DL (ref 6–20)
BUN/CREAT SERPL: 10.9 (ref 7–25)
CALCIUM SPEC-SCNC: 10 MG/DL (ref 8.6–10.5)
CHLORIDE SERPL-SCNC: 93 MMOL/L (ref 98–107)
CHOLEST SERPL-MCNC: 170 MG/DL (ref 0–200)
CO2 SERPL-SCNC: 30 MMOL/L (ref 22–29)
CREAT SERPL-MCNC: 1.1 MG/DL (ref 0.76–1.27)
CREAT UR-MCNC: 37.4 MG/DL
EGFRCR SERPLBLD CKD-EPI 2021: 85.4 ML/MIN/1.73
GLOBULIN UR ELPH-MCNC: 2.7 GM/DL
GLUCOSE SERPL-MCNC: 162 MG/DL (ref 65–99)
HDLC SERPL-MCNC: 72 MG/DL (ref 40–60)
LDLC SERPL CALC-MCNC: 82 MG/DL (ref 0–100)
LDLC/HDLC SERPL: 1.12 {RATIO}
MICROALBUMIN/CREAT UR: NORMAL MG/G{CREAT}
POTASSIUM SERPL-SCNC: 4.5 MMOL/L (ref 3.5–5.2)
PROT SERPL-MCNC: 7.5 G/DL (ref 6–8.5)
SODIUM SERPL-SCNC: 132 MMOL/L (ref 136–145)
TRIGL SERPL-MCNC: 88 MG/DL (ref 0–150)
TSH SERPL DL<=0.05 MIU/L-ACNC: 3.71 UIU/ML (ref 0.27–4.2)
VLDLC SERPL-MCNC: 16 MG/DL (ref 5–40)

## 2022-11-07 LAB
TESTOST FREE MFR SERPL: 4.09 % (ref 1.5–4.2)
TESTOST FREE SERPL-MCNC: 15.82 NG/DL (ref 5–21)
TESTOST SERPL-MCNC: 386.7 NG/DL (ref 264–916)

## 2022-11-17 NOTE — TELEPHONE ENCOUNTER
Lab Results   Component Value Date    GLUCOSE 162 (H) 11/01/2022    CALCIUM 10.0 11/01/2022     (L) 11/01/2022    K 4.5 11/01/2022    CO2 30.0 (H) 11/01/2022    CL 93 (L) 11/01/2022    BUN 12 11/01/2022    CREATININE 1.10 11/01/2022    EGFRIFNONA 68 03/03/2021    BCR 10.9 11/01/2022    ANIONGAP 9.0 11/01/2022     Last Na+ 132.     Ok to fill HCTZ 25 mg daily?    Please advise.

## 2022-11-18 RX ORDER — HYDROCHLOROTHIAZIDE 25 MG/1
TABLET ORAL
Qty: 90 TABLET | Refills: 1 | Status: SHIPPED | OUTPATIENT
Start: 2022-11-18

## 2022-11-18 NOTE — TELEPHONE ENCOUNTER
John Sommers MD Hurley, Mary Katheri, RN  Caller: Unspecified (Yesterday,  6:04 AM)  Sodium is mildly reduced but stable.  Ok to refill.  Thank you

## 2022-12-05 ENCOUNTER — OFFICE VISIT (OUTPATIENT)
Dept: INTERNAL MEDICINE | Facility: CLINIC | Age: 42
End: 2022-12-05

## 2022-12-05 VITALS
SYSTOLIC BLOOD PRESSURE: 126 MMHG | DIASTOLIC BLOOD PRESSURE: 82 MMHG | HEIGHT: 69 IN | TEMPERATURE: 97.5 F | OXYGEN SATURATION: 94 % | BODY MASS INDEX: 29.65 KG/M2 | HEART RATE: 80 BPM | RESPIRATION RATE: 20 BRPM | WEIGHT: 200.2 LBS

## 2022-12-05 DIAGNOSIS — K51.019 ULCERATIVE PANCOLITIS WITH COMPLICATION: Primary | ICD-10-CM

## 2022-12-05 DIAGNOSIS — Z79.4 TYPE 2 DIABETES MELLITUS WITH DIABETIC AUTONOMIC NEUROPATHY, WITH LONG-TERM CURRENT USE OF INSULIN: ICD-10-CM

## 2022-12-05 DIAGNOSIS — E78.49 OTHER HYPERLIPIDEMIA: ICD-10-CM

## 2022-12-05 DIAGNOSIS — E11.43 TYPE 2 DIABETES MELLITUS WITH DIABETIC AUTONOMIC NEUROPATHY, WITH LONG-TERM CURRENT USE OF INSULIN: ICD-10-CM

## 2022-12-05 DIAGNOSIS — I10 ESSENTIAL HYPERTENSION: ICD-10-CM

## 2022-12-05 PROCEDURE — 99213 OFFICE O/P EST LOW 20 MIN: CPT | Performed by: NURSE PRACTITIONER

## 2022-12-05 RX ORDER — LISINOPRIL 10 MG/1
TABLET ORAL
COMMUNITY
Start: 2022-11-17 | End: 2022-12-05
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
## 2022-12-05 NOTE — PATIENT INSTRUCTIONS
MyPlate from USDA  MyPlate is an outline of a general healthy diet based on the Dietary Guidelines for Americans, 6026-0473, from the U.S. Department of Agriculture (USDA). It sets guidelines for how much food you should eat from each food group based on your age, sex, and level of physical activity.  What are tips for following MyPlate?  To follow MyPlate recommendations:  Eat a wide variety of fruits and vegetables, grains, and protein foods.  Serve smaller portions and eat less food throughout the day.  Limit portion sizes to avoid overeating.  Enjoy your food.  Get at least 150 minutes of exercise every week. This is about 30 minutes each day, 5 or more days per week.  It can be difficult to have every meal look like MyPlate. Think about MyPlate as eating guidelines for an entire day, rather than each individual meal.  Fruits and vegetables  Make one half of your plate fruits and vegetables.  Eat many different colors of fruits and vegetables each day.  For a 2,000-calorie daily food plan, eat:  2½ cups of vegetables every day.  2 cups of fruit every day.  1 cup is equal to:  1 cup raw or cooked vegetables.  1 cup raw fruit.  1 medium-sized orange, apple, or banana.  1 cup 100% fruit or vegetable juice.  2 cups raw leafy greens, such as lettuce, spinach, or kale.  ½ cup dried fruit.  Grains  One fourth of your plate should be grains.  Make at least half of the grains you eat each day whole grains.  For a 2,000-calorie daily food plan, eat 6 oz of grains every day.  1 oz is equal to:  1 slice bread.  1 cup cereal.  ½ cup cooked rice, cereal, or pasta.  Protein  One fourth of your plate should be protein.  Eat a wide variety of protein foods, including meat, poultry, fish, eggs, beans, nuts, and tofu.  For a 2,000-calorie daily food plan, eat 5½ oz of protein every day.  1 oz is equal to:  1 oz meat, poultry, or fish.  ¼ cup cooked beans.  1 egg.  ½ oz nuts or seeds.  1 Tbsp peanut butter.  Dairy  Drink fat-free  or low-fat (1%) milk.  Eat or drink dairy as a side to meals.  For a 2,000-calorie daily food plan, eat or drink 3 cups of dairy every day.  1 cup is equal to:  1 cup milk, yogurt, cottage cheese, or soy milk (soy beverage).  2 oz processed cheese.  1½ oz natural cheese.  Fats, oils, salt, and sugars  Only small amounts of oils are recommended.  Avoid foods that are high in calories and low in nutritional value (empty calories), like foods high in fat or added sugars.  Choose foods that are low in salt (sodium). Choose foods that have less than 140 milligrams (mg) of sodium per serving.  Drink water instead of sugary drinks. Drink enough fluid to keep your urine pale yellow.  Where to find support  Work with your health care provider or a dietitian to develop a customized eating plan that is right for you.  Download an dinh (mobile application) to help you track your daily food intake.  Where to find more information  USDA: ChooseMyPlate.gov  Summary  MyPlate is a general guideline for healthy eating from the USDA. It is based on the Dietary Guidelines for Americans, 0786-2474.  In general, fruits and vegetables should take up one half of your plate, grains should take up one fourth of your plate, and protein should take up one fourth of your plate.  This information is not intended to replace advice given to you by your health care provider. Make sure you discuss any questions you have with your health care provider.  Document Revised: 11/08/2021 Document Reviewed: 11/08/2021  [email or web address] Patient Education © 2022 Else[employer] Inc.

## 2023-01-26 RX ORDER — ROSUVASTATIN CALCIUM 10 MG/1
TABLET, COATED ORAL
Qty: 30 TABLET | Refills: 5 | Status: SHIPPED | OUTPATIENT
Start: 2023-01-26

## 2023-01-26 RX ORDER — BUPROPION HYDROCHLORIDE 300 MG/1
TABLET ORAL
Qty: 90 TABLET | Refills: 0 | Status: SHIPPED | OUTPATIENT
Start: 2023-01-26

## 2023-04-19 RX ORDER — INSULIN LISPRO 100 [IU]/ML
INJECTION, SOLUTION INTRAVENOUS; SUBCUTANEOUS
Qty: 9 ML | Refills: 1 | Status: SHIPPED | OUTPATIENT
Start: 2023-04-19

## 2023-04-25 ENCOUNTER — OFFICE VISIT (OUTPATIENT)
Dept: CARDIOLOGY | Facility: CLINIC | Age: 43
End: 2023-04-25
Payer: COMMERCIAL

## 2023-04-25 VITALS
DIASTOLIC BLOOD PRESSURE: 76 MMHG | HEIGHT: 68 IN | HEART RATE: 95 BPM | BODY MASS INDEX: 30.16 KG/M2 | OXYGEN SATURATION: 99 % | SYSTOLIC BLOOD PRESSURE: 122 MMHG | WEIGHT: 199 LBS

## 2023-04-25 DIAGNOSIS — E78.49 OTHER HYPERLIPIDEMIA: Primary | ICD-10-CM

## 2023-04-25 PROCEDURE — 99214 OFFICE O/P EST MOD 30 MIN: CPT | Performed by: INTERNAL MEDICINE

## 2023-04-25 PROCEDURE — 93000 ELECTROCARDIOGRAM COMPLETE: CPT | Performed by: INTERNAL MEDICINE

## 2023-04-25 RX ORDER — LISINOPRIL 10 MG/1
TABLET ORAL
COMMUNITY
Start: 2023-04-19
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
## 2023-04-28 RX ORDER — BUPROPION HYDROCHLORIDE 300 MG/1
TABLET ORAL
Qty: 90 TABLET | Refills: 0 | Status: SHIPPED | OUTPATIENT
Start: 2023-04-28

## 2023-05-23 RX ORDER — HYDROCHLOROTHIAZIDE 25 MG/1
TABLET ORAL
Qty: 90 TABLET | Refills: 1 | Status: SHIPPED | OUTPATIENT
Start: 2023-05-23

## 2023-07-27 RX ORDER — INSULIN GLARGINE 100 [IU]/ML
INJECTION, SOLUTION SUBCUTANEOUS
Qty: 15 ML | Refills: 3 | Status: SHIPPED | OUTPATIENT
Start: 2023-07-27

## 2023-07-27 NOTE — TELEPHONE ENCOUNTER
Rx Refill Note    Requested Prescriptions     Pending Prescriptions Disp Refills    Insulin Glargine (Lantus SoloStar) 100 UNIT/ML injection pen 15 mL 3     Si units daily        Last office visit with prescribing clinician: 2022       Next office visit with prescribing clinician: Visit date not found     {    Talita Ag MA  23, 07:43 EDT

## 2023-08-14 RX ORDER — LISINOPRIL 10 MG/1
TABLET ORAL
Qty: 90 TABLET | Refills: 1 | Status: SHIPPED | OUTPATIENT
Start: 2023-08-14

## 2023-08-22 RX ORDER — ROSUVASTATIN CALCIUM 10 MG/1
TABLET, COATED ORAL
Qty: 90 TABLET | Refills: 2 | Status: SHIPPED | OUTPATIENT
Start: 2023-08-22

## 2023-11-07 DIAGNOSIS — K50.919 CROHN'S DISEASE WITH COMPLICATION, UNSPECIFIED GASTROINTESTINAL TRACT LOCATION: Primary | ICD-10-CM

## 2023-11-07 DIAGNOSIS — K51.019 ULCERATIVE PANCOLITIS WITH COMPLICATION: ICD-10-CM

## 2023-11-07 RX ORDER — SULFASALAZINE 500 MG/1
2000 TABLET ORAL 2 TIMES DAILY
Qty: 240 TABLET | Refills: 1 | OUTPATIENT
Start: 2023-11-07

## 2023-11-07 RX ORDER — SULFASALAZINE 500 MG/1
2000 TABLET ORAL 2 TIMES DAILY
Qty: 240 TABLET | Refills: 1 | Status: CANCELLED | OUTPATIENT
Start: 2023-11-07

## 2023-11-07 NOTE — TELEPHONE ENCOUNTER
LOV 07/13/2023  NOV     Patient stated that he has been trying to get in with his GI doctor to get this rx'd but has not had any luck. Patient is wanting to know if you could possibly write this short term til he can get into GI

## 2023-11-08 NOTE — TELEPHONE ENCOUNTER
Tried to reach patient no answer left voicemail to return call    RELAY:  He will need to complete lab work; labs ordered.

## 2023-11-09 ENCOUNTER — LAB (OUTPATIENT)
Dept: INTERNAL MEDICINE | Facility: CLINIC | Age: 43
End: 2023-11-09
Payer: COMMERCIAL

## 2023-11-09 DIAGNOSIS — K50.919 CROHN'S DISEASE WITH COMPLICATION, UNSPECIFIED GASTROINTESTINAL TRACT LOCATION: ICD-10-CM

## 2023-11-09 PROCEDURE — 80053 COMPREHEN METABOLIC PANEL: CPT | Performed by: NURSE PRACTITIONER

## 2023-11-10 LAB
ALBUMIN SERPL-MCNC: 4.6 G/DL (ref 3.5–5.2)
ALBUMIN/GLOB SERPL: 2.1 G/DL
ALP SERPL-CCNC: 107 U/L (ref 39–117)
ALT SERPL W P-5'-P-CCNC: 35 U/L (ref 1–41)
ANION GAP SERPL CALCULATED.3IONS-SCNC: 11 MMOL/L (ref 5–15)
AST SERPL-CCNC: 22 U/L (ref 1–40)
BILIRUB SERPL-MCNC: 0.4 MG/DL (ref 0–1.2)
BUN SERPL-MCNC: 16 MG/DL (ref 6–20)
BUN/CREAT SERPL: 17.4 (ref 7–25)
CALCIUM SPEC-SCNC: 9.8 MG/DL (ref 8.6–10.5)
CHLORIDE SERPL-SCNC: 100 MMOL/L (ref 98–107)
CO2 SERPL-SCNC: 25 MMOL/L (ref 22–29)
CREAT SERPL-MCNC: 0.92 MG/DL (ref 0.76–1.27)
EGFRCR SERPLBLD CKD-EPI 2021: 105.2 ML/MIN/1.73
GLOBULIN UR ELPH-MCNC: 2.2 GM/DL
GLUCOSE SERPL-MCNC: 253 MG/DL (ref 65–99)
POTASSIUM SERPL-SCNC: 4.3 MMOL/L (ref 3.5–5.2)
PROT SERPL-MCNC: 6.8 G/DL (ref 6–8.5)
SODIUM SERPL-SCNC: 136 MMOL/L (ref 136–145)

## 2023-11-12 DIAGNOSIS — K51.019 ULCERATIVE PANCOLITIS WITH COMPLICATION: ICD-10-CM

## 2023-11-12 RX ORDER — SULFASALAZINE 500 MG/1
2000 TABLET ORAL 2 TIMES DAILY
Qty: 240 TABLET | Refills: 0 | Status: SHIPPED | OUTPATIENT
Start: 2023-11-12

## 2023-11-13 ENCOUNTER — TELEPHONE (OUTPATIENT)
Dept: INTERNAL MEDICINE | Facility: CLINIC | Age: 43
End: 2023-11-13

## 2023-11-13 NOTE — TELEPHONE ENCOUNTER
----- Message from FERNANDA Terrell sent at 11/12/2023  9:29 PM EST -----  I sent in a one month supply of sulfasalazine; please follow-up with GI.

## 2023-11-13 NOTE — TELEPHONE ENCOUNTER
I have called and left a message with return phone number    Relay:  I sent in a one month supply of sulfasalazine; please follow-up with GI.

## 2023-11-14 NOTE — TELEPHONE ENCOUNTER
2nd attempt     I have called and left a message with return phone number     Relay:  I sent in a one month supply of sulfasalazine; please follow-up with GI

## 2023-11-15 NOTE — TELEPHONE ENCOUNTER
3rd attempt      I have called and left a message with return phone number     Relay:  I sent in a one month supply of sulfasalazine; please follow-up with GI

## 2023-11-20 RX ORDER — HYDROCHLOROTHIAZIDE 25 MG/1
25 TABLET ORAL DAILY
Qty: 90 TABLET | Refills: 1 | Status: SHIPPED | OUTPATIENT
Start: 2023-11-20

## 2024-01-19 DIAGNOSIS — F41.9 ANXIETY: ICD-10-CM

## 2024-01-22 RX ORDER — BUPROPION HYDROCHLORIDE 300 MG/1
300 TABLET ORAL DAILY
Qty: 90 TABLET | Refills: 1 | Status: SHIPPED | OUTPATIENT
Start: 2024-01-22

## 2024-02-09 DIAGNOSIS — K51.019 ULCERATIVE PANCOLITIS WITH COMPLICATION: ICD-10-CM

## 2024-02-09 RX ORDER — SULFASALAZINE 500 MG/1
2000 TABLET ORAL 2 TIMES DAILY
Qty: 240 TABLET | Refills: 0 | OUTPATIENT
Start: 2024-02-09

## 2024-02-09 RX ORDER — LISINOPRIL 10 MG/1
10 TABLET ORAL DAILY
Qty: 90 TABLET | Refills: 1 | Status: SHIPPED | OUTPATIENT
Start: 2024-02-09

## 2024-03-04 ENCOUNTER — OFFICE VISIT (OUTPATIENT)
Dept: GASTROENTEROLOGY | Facility: CLINIC | Age: 44
End: 2024-03-04
Payer: COMMERCIAL

## 2024-03-04 VITALS
WEIGHT: 201.6 LBS | TEMPERATURE: 97.8 F | BODY MASS INDEX: 30.55 KG/M2 | HEART RATE: 97 BPM | DIASTOLIC BLOOD PRESSURE: 96 MMHG | HEIGHT: 68 IN | SYSTOLIC BLOOD PRESSURE: 147 MMHG

## 2024-03-04 DIAGNOSIS — K51.00 ULCERATIVE PANCOLITIS WITHOUT COMPLICATION: Primary | ICD-10-CM

## 2024-03-04 DIAGNOSIS — K51.019 ULCERATIVE PANCOLITIS WITH COMPLICATION: ICD-10-CM

## 2024-03-04 PROCEDURE — 99214 OFFICE O/P EST MOD 30 MIN: CPT | Performed by: NURSE PRACTITIONER

## 2024-03-04 RX ORDER — SULFASALAZINE 500 MG/1
2000 TABLET ORAL 2 TIMES DAILY
Qty: 240 TABLET | Refills: 11 | Status: SHIPPED | OUTPATIENT
Start: 2024-03-04

## 2024-03-04 NOTE — PROGRESS NOTES
GASTROENTEROLOGY OFFICE NOTE  Eric Ramirez  [id number]  1980    CARE TEAM  Patient Care Team:  Karen Colmenares APRN as PCP - General (Nurse Practitioner)  Myriam Nelson MD as Consulting Physician (Colon and Rectal Surgery)  John Sommers MD as Consulting Physician (Cardiology)    Referring Provider: Karen Colmenares APRN    Chief Complaint   Patient presents with    Follow-up        HISTORY OF PRESENT ILLNESS:  Patient is a 43-year-old male with history of diabetes, Raynaud's syndrome seen in follow-up for ulcerative colitis managed with sulfasalazine 2 g twice daily.    He was diagnosed with ulcerative colitis in 2013 by Myriam Mar.  He does not recall the extent of the involvement of his colon.  He subsequently was treated with Lialda and a brief course of steroids.  His last colonoscopy was in 2016 that revealed colitis moderate in the cecum, ascending and transverse colon.    He transitioned his care to [facility] and was seen by Dr. Desirae calhoun.  While under her care he was very pleased with the improvement in symptoms with sulfasalazine which she continues currently at a dose of 2 g twice daily along with folic acid.  His GI symptoms seem to be in remission.  He denies unexplained weight loss, melanotic stool, bright red per rectum, constipation or diarrhea.    He transitioned care to Dr. Montano in March 2020 and has been lost to follow-up since time.  However, at his initial visit in March 2020 he complained of arthralgias/arthritis with swelling of the fingers and hands mostly.  He was referred to rheumatology given his family history of scleroderma.  He reports that he went to see rheumatology, he was not overly impressed with the visit and has not followed up.  He continues with complaints of arthralgias in his fingers and hands mostly but also feels it in his shoulders.    He has a history of elevated liver enzymes which would raise concern for primary  sclerosing cholangitis given his history of ulcerative colitis.  His liver enzymes have now normalized and further workup therefore has not been completed nor recommended.    PAST MEDICAL HISTORY  Past Medical History:   Diagnosis Date    Acute pharyngitis     Arrhythmia     Arthritis     Bronchitis     Chest wall injury     Diabetes mellitus     Gallbladder disease     Gastritis     Generalized anxiety disorder     GERD (gastroesophageal reflux disease)     Hypertension     Internal hemorrhoids     Labyrinthitis     Palpitations     Pre-diabetes     Rectal hemorrhage     Sinusitis     Type 2 diabetes mellitus     Ulcerative colitis     URI (upper respiratory infection)         PAST SURGICAL HISTORY  Past Surgical History:   Procedure Laterality Date    CHOLECYSTECTOMY      COLONOSCOPY  07/28/16By Dr. Luci Nelson        MEDICATIONS:    Current Outpatient Medications:     buPROPion XL (WELLBUTRIN XL) 300 MG 24 hr tablet, TAKE 1 TABLET BY MOUTH DAILY, Disp: 90 tablet, Rfl: 1    Cetirizine HCl (ZYRTEC ALLERGY PO), Take 1 tablet by mouth daily., Disp: , Rfl:     FOLIC ACID PO, Take 1 tablet by mouth Daily., Disp: , Rfl:     hydroCHLOROthiazide (HYDRODIURIL) 25 MG tablet, TAKE 1 TABLET BY MOUTH DAILY, Disp: 90 tablet, Rfl: 1    Insulin Glargine (Lantus SoloStar) 100 UNIT/ML injection pen, 15 units daily, Disp: 15 mL, Rfl: 3    Insulin Lispro, 1 Unit Dial, (HUMALOG) 100 UNIT/ML solution pen-injector, INJECT UNDER THE SKIN AT MEAL TIMES AND PER CORRECTIONAL SCALE. MAX DAILY DOSE OF 30 UNITS., Disp: 9 mL, Rfl: 1    Insulin Pen Needle 31G X 5 MM misc, 1 each Daily. Use to inject insulin, Disp: 100 each, Rfl: 11    lisinopril (PRINIVIL,ZESTRIL) 10 MG tablet, TAKE 1 TABLET BY MOUTH DAILY, Disp: 90 tablet, Rfl: 1    omeprazole (priLOSEC) 20 MG capsule, TAKE ONE CAPSULE BY MOUTH DAILY, Disp: 90 capsule, Rfl: 3    ondansetron ODT (ZOFRAN-ODT) 4 MG disintegrating tablet, Place 1 tablet on the tongue Every 8 (Eight) Hours As  "Needed for Nausea or Vomiting for up to 6 doses., Disp: 6 tablet, Rfl: 0    Probiotic Product (PROBIOTIC DAILY PO), Take 1 tablet by mouth Daily., Disp: , Rfl:     rosuvastatin (CRESTOR) 10 MG tablet, TAKE ONE TABLET BY MOUTH DAILY, Disp: 90 tablet, Rfl: 2    sulfaSALAzine (AZULFIDINE) 500 MG tablet, Take 4 tablets by mouth 2 (Two) Times a Day., Disp: 240 tablet, Rfl: 0    ALLERGIES  Allergies   Allergen Reactions    Levofloxacin Itching     Throat itching, eyes watering    Buspirone Itching    Flagyl [Metronidazole] Hives and Swelling       FAMILY HISTORY:  Family History   Problem Relation Age of Onset    Hypertension Mother     Melanoma Father     Heart disease Father     Scleroderma Sister     Pulmonary fibrosis Sister     Pyloric stenosis Sister     Colon polyps Neg Hx     Colon cancer Neg Hx        SOCIAL HISTORY  Social History     Socioeconomic History    Marital status:     Number of children: 3   Tobacco Use    Smoking status: Former     Current packs/day: 0.00     Average packs/day: 1 pack/day for 15.0 years (15.0 ttl pk-yrs)     Types: Cigarettes     Start date: 1997     Quit date: 2012     Years since quittin.1    Smokeless tobacco: Never   Vaping Use    Vaping status: Never Used   Substance and Sexual Activity    Alcohol use: Yes     Alcohol/week: 2.0 - 3.0 standard drinks of alcohol     Types: 2 - 3 Cans of beer per week     Comment: social      Drug use: No    Sexual activity: Yes     Partners: Female         PHYSICAL EXAM   /96 (BP Location: Right arm, Patient Position: Sitting, Cuff Size: Adult)   Pulse 97   Temp 97.8 °F (36.6 °C) (Temporal)   Ht 172.7 cm (68\")   Wt 91.4 kg (201 lb 9.6 oz)   BMI 30.65 kg/m²   Physical Exam  Constitutional:       Appearance: Normal appearance.   HENT:      Head: Normocephalic and atraumatic.   Pulmonary:      Effort: Pulmonary effort is normal.   Neurological:      Mental Status: He is alert and oriented to person, place, and time. "   Psychiatric:         Mood and Affect: Mood normal.         Thought Content: Thought content normal.           Results Review:  Component  Ref Range & Units 3 mo ago  (11/9/23) 7 mo ago  (7/13/23) 1 yr ago  (11/1/22) 1 yr ago  (11/1/22) 1 yr ago  (6/13/22) 1 yr ago  (4/5/22) 1 yr ago  (3/8/22)   Glucose  65 - 99 mg/dL 253 High  191 High  162 High  160 Abnormal  R 169 Abnormal  R 411 Abnormal  R 415 Abnormal  R   BUN  6 - 20 mg/dL 16 13 12       Creatinine  0.76 - 1.27 mg/dL 0.92 0.94 1.10       Sodium  136 - 145 mmol/L 136 135 Low  132 Low        Potassium  3.5 - 5.2 mmol/L 4.3 4.0 4.5       Chloride  98 - 107 mmol/L 100 100 93 Low        CO2  22.0 - 29.0 mmol/L 25.0 25.1 30.0 High        Calcium  8.6 - 10.5 mg/dL 9.8 10.0 10.0       Total Protein  6.0 - 8.5 g/dL 6.8 6.9 7.5       Albumin  3.5 - 5.2 g/dL 4.6 4.5 4.80 R       ALT (SGPT)  1 - 41 U/L 35 34 42 High        AST (SGOT)  1 - 40 U/L 22 24 29       Alkaline Phosphatase  39 - 117 U/L 107 93 112       Total Bilirubin  0.0 - 1.2 mg/dL 0.4 0.5 0.5       Globulin  gm/dL 2.2 2.4 2.7       A/G Ratio  g/dL 2.1 1.9 1.8       BUN/Creatinine Ratio  7.0 - 25.0 17.4 13.8 10.9       Anion Gap  5.0 - 15.0 mmol/L 11.0 9.9 9.0       eGFR  >60.0 mL/min/1.73 105.2 102.5 85.4 CM         ASSESSMENT / PLAN  1.  Ulcerative colitis  2.  Greater than average risk for colon cancer (last colonoscopy 2016)  - Sulfasalazine 2 g twice daily  - Colonoscopy    Return in about 1 year (around 3/4/2025).    I discussed the patients findings and my recommendations with patient    Sina Gordon, APRN

## 2024-04-16 RX ORDER — SODIUM, POTASSIUM,MAG SULFATES 17.5-3.13G
SOLUTION, RECONSTITUTED, ORAL ORAL
Qty: 354 ML | Refills: 0 | Status: SHIPPED | OUTPATIENT
Start: 2024-04-16

## 2024-04-30 ENCOUNTER — OUTSIDE FACILITY SERVICE (OUTPATIENT)
Dept: GASTROENTEROLOGY | Facility: CLINIC | Age: 44
End: 2024-04-30
Payer: COMMERCIAL

## 2024-04-30 PROCEDURE — 88305 TISSUE EXAM BY PATHOLOGIST: CPT | Performed by: INTERNAL MEDICINE

## 2024-04-30 PROCEDURE — 45380 COLONOSCOPY AND BIOPSY: CPT | Performed by: INTERNAL MEDICINE

## 2024-05-01 ENCOUNTER — LAB REQUISITION (OUTPATIENT)
Dept: LAB | Facility: HOSPITAL | Age: 44
End: 2024-05-01
Payer: COMMERCIAL

## 2024-05-01 DIAGNOSIS — Z12.11 ENCOUNTER FOR SCREENING FOR MALIGNANT NEOPLASM OF COLON: ICD-10-CM

## 2024-05-01 DIAGNOSIS — K57.30 DIVERTICULOSIS OF LARGE INTESTINE WITHOUT PERFORATION OR ABSCESS WITHOUT BLEEDING: ICD-10-CM

## 2024-05-01 DIAGNOSIS — K51.90 ULCERATIVE COLITIS, UNSPECIFIED, WITHOUT COMPLICATIONS: ICD-10-CM

## 2024-05-02 LAB
CYTO UR: NORMAL
LAB AP CASE REPORT: NORMAL
LAB AP CLINICAL INFORMATION: NORMAL
PATH REPORT.FINAL DX SPEC: NORMAL
PATH REPORT.GROSS SPEC: NORMAL

## 2024-05-31 RX ORDER — ROSUVASTATIN CALCIUM 10 MG/1
10 TABLET, COATED ORAL DAILY
Qty: 90 TABLET | Refills: 0 | Status: SHIPPED | OUTPATIENT
Start: 2024-05-31

## 2024-07-18 RX ORDER — HYDROCHLOROTHIAZIDE 25 MG/1
25 TABLET ORAL DAILY
Qty: 90 TABLET | Refills: 1 | Status: SHIPPED | OUTPATIENT
Start: 2024-07-18

## 2024-07-20 DIAGNOSIS — F41.9 ANXIETY: ICD-10-CM

## 2024-07-22 RX ORDER — BUPROPION HYDROCHLORIDE 300 MG/1
300 TABLET ORAL DAILY
Qty: 90 TABLET | Refills: 0 | Status: SHIPPED | OUTPATIENT
Start: 2024-07-22

## 2024-07-22 NOTE — TELEPHONE ENCOUNTER
LOV 07/13/2023  NOV Not scheduled yet    We recently received your message to refill your medication(s).  Our records indicate that you did not schedule a follow up appointment with your provider at your last visit on 07/13/2023. The medication that you are on requires a follow up appointment for additional refills. Patient was to schedule on or around 01/13/2024 for Annual

## 2024-08-06 RX ORDER — LISINOPRIL 10 MG/1
10 TABLET ORAL DAILY
Qty: 90 TABLET | Refills: 3 | Status: SHIPPED | OUTPATIENT
Start: 2024-08-06

## 2024-08-06 RX ORDER — INSULIN GLARGINE 100 [IU]/ML
INJECTION, SOLUTION SUBCUTANEOUS
Qty: 3 ML | Refills: 0 | Status: SHIPPED | OUTPATIENT
Start: 2024-08-06

## 2024-08-06 RX ORDER — INSULIN GLARGINE 100 [IU]/ML
INJECTION, SOLUTION SUBCUTANEOUS
Qty: 12 ML | Refills: 0 | OUTPATIENT
Start: 2024-08-06

## 2024-08-06 NOTE — TELEPHONE ENCOUNTER
Called and spoke to the wife, Sil. I told her I needed to know how many units of Lantus the pt is taking.    She is going to find out and return call.

## 2024-08-06 NOTE — TELEPHONE ENCOUNTER
Rx Refill Note    Requested Prescriptions     Pending Prescriptions Disp Refills    Insulin Glargine (Lantus SoloStar) 100 UNIT/ML injection pen [Pharmacy Med Name: LANTUS SOLOSTAR 100 UNIT/ML] 12 mL      Sig: INJECT FIFTEEN UNITS UNDER THE SKIN DAILY        Last office visit with prescribing clinician: 11/1/2022       Next office visit with prescribing clinician: 9/5/2024     {    Talita Ag MA  08/06/24, 09:05 EDT

## 2024-08-06 NOTE — TELEPHONE ENCOUNTER
Please contact patient.  He has not been seen since November 2022.  In this setting (when patient has not been seen in more than 1 year) we generally do not refill prescriptions.  He either needs a sooner appointment so that I can provide refills or he will need to obtain insulin from his PCP in the interim.  I can see him 2 PM this afternoon, if needed.  (Can do VV)

## 2024-08-06 NOTE — TELEPHONE ENCOUNTER
Spoke with patient wife, scheduled patient for a sooner follow up. Stated that patient is out of town this week for training for work and cannot do an appointment for this week. Scheduled patient for next week. She is wanting to know if a week supply can be called into his pharmacy. Would want this sent to [facility] pharmacy in [place].

## 2024-08-06 NOTE — TELEPHONE ENCOUNTER
Rx Refill Note  Requested Prescriptions     Refused Prescriptions Disp Refills    Insulin Glargine (Lantus SoloStar) 100 UNIT/ML injection pen [Pharmacy Med Name: LANTUS SOLOSTAR 100 UNIT/ML] 12 mL 0     Sig: INJECT FIFTEEN UNITS UNDER THE SKIN DAILY     Refused By: CLARISSA DARLING     Reason for Refusal: Patient needs an appointment      Last office visit with prescribing clinician: 11/1/2022     Next office visit with prescribing clinician: 8/14/2024

## 2024-08-06 NOTE — TELEPHONE ENCOUNTER
Wife called back and stated the pt is taking 15 units daily of the Lantus. She is aware that enough will be sent in to get him to his appt.

## 2024-08-06 NOTE — TELEPHONE ENCOUNTER
Lab Results   Component Value Date    GLUCOSE 253 (H) 11/09/2023    BUN 16 11/09/2023    CREATININE 0.92 11/09/2023     11/09/2023    K 4.3 11/09/2023     11/09/2023    CALCIUM 9.8 11/09/2023    PROTEINTOT 6.8 11/09/2023    ALBUMIN 4.6 11/09/2023    ALT 35 11/09/2023    AST 22 11/09/2023    ALKPHOS 107 11/09/2023    BILITOT 0.4 11/09/2023    GLOB 2.2 11/09/2023    AGRATIO 2.1 11/09/2023    BCR 17.4 11/09/2023    ANIONGAP 11.0 11/09/2023    EGFR 105.2 11/09/2023

## 2024-08-06 NOTE — TELEPHONE ENCOUNTER
Called and spoke to the wife and she is aware the pt is not getting the lantus until he has an appt.    She was transferred to the front to be scheduled for an earlier appt with a PA.

## 2024-08-09 PROBLEM — I49.3 PVC (PREMATURE VENTRICULAR CONTRACTION): Status: ACTIVE | Noted: 2024-08-09

## 2024-08-27 ENCOUNTER — OFFICE VISIT (OUTPATIENT)
Dept: CARDIOLOGY | Facility: CLINIC | Age: 44
End: 2024-08-27
Payer: COMMERCIAL

## 2024-08-27 VITALS
DIASTOLIC BLOOD PRESSURE: 84 MMHG | WEIGHT: 187 LBS | HEIGHT: 69 IN | SYSTOLIC BLOOD PRESSURE: 122 MMHG | OXYGEN SATURATION: 96 % | HEART RATE: 97 BPM | BODY MASS INDEX: 27.7 KG/M2

## 2024-08-27 DIAGNOSIS — I49.3 PVC (PREMATURE VENTRICULAR CONTRACTION): Primary | ICD-10-CM

## 2024-08-27 DIAGNOSIS — E78.2 MIXED HYPERLIPIDEMIA: ICD-10-CM

## 2024-08-27 DIAGNOSIS — I10 PRIMARY HYPERTENSION: ICD-10-CM

## 2024-08-27 PROCEDURE — 99213 OFFICE O/P EST LOW 20 MIN: CPT | Performed by: PHYSICIAN ASSISTANT

## 2024-08-27 RX ORDER — ROSUVASTATIN CALCIUM 10 MG/1
10 TABLET, COATED ORAL DAILY
Qty: 90 TABLET | Refills: 3 | Status: SHIPPED | OUTPATIENT
Start: 2024-08-27

## 2024-08-27 RX ORDER — HYDROCHLOROTHIAZIDE 25 MG/1
25 TABLET ORAL DAILY
Qty: 90 TABLET | Refills: 3 | Status: SHIPPED | OUTPATIENT
Start: 2024-08-27
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
## 2024-09-05 ENCOUNTER — OFFICE VISIT (OUTPATIENT)
Dept: ENDOCRINOLOGY | Facility: CLINIC | Age: 44
End: 2024-09-05
Payer: COMMERCIAL

## 2024-09-05 ENCOUNTER — PRIOR AUTHORIZATION (OUTPATIENT)
Dept: ENDOCRINOLOGY | Facility: CLINIC | Age: 44
End: 2024-09-05

## 2024-09-05 VITALS
DIASTOLIC BLOOD PRESSURE: 80 MMHG | OXYGEN SATURATION: 98 % | SYSTOLIC BLOOD PRESSURE: 114 MMHG | BODY MASS INDEX: 35.32 KG/M2 | HEIGHT: 65 IN | HEART RATE: 74 BPM | WEIGHT: 212 LBS

## 2024-09-05 DIAGNOSIS — E78.5 HYPERLIPIDEMIA, UNSPECIFIED HYPERLIPIDEMIA TYPE: ICD-10-CM

## 2024-09-05 DIAGNOSIS — Z79.4 TYPE 2 DIABETES MELLITUS WITH HYPERGLYCEMIA, WITH LONG-TERM CURRENT USE OF INSULIN: Primary | ICD-10-CM

## 2024-09-05 DIAGNOSIS — E11.65 TYPE 2 DIABETES MELLITUS WITH HYPERGLYCEMIA, WITH LONG-TERM CURRENT USE OF INSULIN: Primary | ICD-10-CM

## 2024-09-05 LAB
CHOLEST SERPL-MCNC: 147 MG/DL (ref 0–200)
EXPIRATION DATE: ABNORMAL
EXPIRATION DATE: ABNORMAL
GLUCOSE BLDC GLUCOMTR-MCNC: 155 MG/DL (ref 70–130)
HBA1C MFR BLD: 7.7 % (ref 4.5–5.7)
HDLC SERPL-MCNC: 57 MG/DL (ref 40–60)
LDLC SERPL CALC-MCNC: 77 MG/DL (ref 0–100)
LDLC/HDLC SERPL: 1.35 {RATIO}
Lab: ABNORMAL
Lab: ABNORMAL
TRIGL SERPL-MCNC: 66 MG/DL (ref 0–150)
TSH SERPL DL<=0.05 MIU/L-ACNC: 2.55 UIU/ML (ref 0.27–4.2)
VLDLC SERPL-MCNC: 13 MG/DL (ref 5–40)

## 2024-09-05 PROCEDURE — 84681 ASSAY OF C-PEPTIDE: CPT | Performed by: INTERNAL MEDICINE

## 2024-09-05 PROCEDURE — 80053 COMPREHEN METABOLIC PANEL: CPT | Performed by: INTERNAL MEDICINE

## 2024-09-05 PROCEDURE — 82570 ASSAY OF URINE CREATININE: CPT | Performed by: INTERNAL MEDICINE

## 2024-09-05 PROCEDURE — 84443 ASSAY THYROID STIM HORMONE: CPT | Performed by: INTERNAL MEDICINE

## 2024-09-05 PROCEDURE — 80061 LIPID PANEL: CPT | Performed by: INTERNAL MEDICINE

## 2024-09-05 PROCEDURE — 82043 UR ALBUMIN QUANTITATIVE: CPT | Performed by: INTERNAL MEDICINE

## 2024-09-05 RX ORDER — INSULIN LISPRO 100 [IU]/ML
INJECTION, SOLUTION INTRAVENOUS; SUBCUTANEOUS
Qty: 15 ML | Refills: 5 | Status: SHIPPED | OUTPATIENT
Start: 2024-09-05

## 2024-09-05 RX ORDER — INSULIN GLARGINE 100 [IU]/ML
INJECTION, SOLUTION SUBCUTANEOUS
Qty: 15 ML | Refills: 5 | Status: SHIPPED | OUTPATIENT
Start: 2024-09-05

## 2024-09-05 RX ORDER — ACYCLOVIR 400 MG/1
1 TABLET ORAL
Qty: 3 EACH | Refills: 5 | Status: SHIPPED | OUTPATIENT
Start: 2024-09-05

## 2024-09-05 NOTE — TELEPHONE ENCOUNTER
Eric Ramirez (Key: [id number])  PA Case ID #: [id number]  Rx #: [id number]  Need Help? Call us at [phone]  Outcome  Approved today by [employer] 2017  PA Case: [id number], Status: Approved, Coverage Starts on: 9/5/2024 12:00:00 AM, Coverage Ends on: 9/5/2025 12:00:00 AM.  Authorization Expiration Date: 9/4/2025  Drug  Dexcom G7 Sensor  ePA cloud logo  Form  [employer] Electronic PA Form (2017 NCPDP)  Original Claim Info  76,75    Phar notified

## 2024-09-05 NOTE — PROGRESS NOTES
"Chief Complaint   Patient presents with    Diabetes        HPI   Eric Ramirez is a 43 y.o. male had concerns including Diabetes.      Patient presents for follow-up of diabetes.  He was last seen in 2022.    Current diabetes medications include the following:  Lantus 15 daily  Humalog 5 units with meals plus correction 1 per 50 greater than 150    Patient is monitoring blood glucose via fingerstick.  He reports morning values ranging 120-140, higher values in the evening.  He reports highest value approximately 250 which is rare.  He reports lowest value 82, of note he does not feel well and his sugar is less than 100.  He also reports some difficulty taking insulin at work and states he will frequently avoid eating during the day so that he does not need to take insulin.    Patient previously did not tolerate metformin.    Patient continues on rosuvastatin 10 mg daily.    The following portions of the patient's history were reviewed and updated as appropriate: allergies, current medications, and past social history.    Review of Systems   Gastrointestinal:  Positive for diarrhea.      /80 (BP Location: Right arm, Patient Position: Sitting, Cuff Size: Adult)   Pulse 74   Ht 165.1 cm (65\")   Wt 96.2 kg (212 lb)   SpO2 98%   BMI 35.28 kg/m²      Physical Exam      Constitutional:  well developed; well nourished  no acute distress  appears stated age   ENT/Thyroid: not examined   Eyes: Conjunctiva: clear   Respiratory:  breathing is unlabored  clear to auscultation bilaterally   Cardiovascular:  regular rate and rhythm   Chest:  Not performed.   Abdomen: Not performed.   : Not performed.   Musculoskeletal: Not performed   Skin: not performed.   Neuro: mental status, speech normal   Psych: mood and affect are within normal limits       Labs/Imaging  Glucose:  Lab Results   Component Value Date    POCGLU 155 (A) 09/05/2024     HbA1c:  Lab Results   Component Value Date    HGBA1C 7.7 (A) 09/05/2024 "    HGBA1C 6.4 11/01/2022     Microalbumin:  Lab Results   Component Value Date    MICROALBUR <1.2 11/01/2022     Lipid Panel:  Lab Results   Component Value Date    CHOL 170 11/01/2022    TRIG 88 11/01/2022    HDL 72 (H) 11/01/2022    LDL 82 11/01/2022    VLDL 16 11/01/2022    LDLHDL 1.12 11/01/2022     CMP:  Lab Results   Component Value Date    BUN 16 11/09/2023    CREATININE 0.92 11/09/2023    EGFRIFNONA 68 03/03/2021    BCR 17.4 11/09/2023     11/09/2023    K 4.3 11/09/2023    CO2 25.0 11/09/2023    CALCIUM 9.8 11/09/2023    ALBUMIN 4.6 11/09/2023    BILITOT 0.4 11/09/2023    ALKPHOS 107 11/09/2023    AST 22 11/09/2023    ALT 35 11/09/2023        Diagnoses and all orders for this visit:    1. Type 2 diabetes mellitus with hyperglycemia, with long-term current use of insulin (Primary)  -     POC Glycosylated Hemoglobin (Hb A1C)  -     POC Glucose, Blood  -     Comprehensive Metabolic Panel; Future  -     Lipid Panel; Future  -     Microalbumin / Creatinine Urine Ratio - Urine, Clean Catch; Future  -     C-Peptide; Future  -     TSH; Future  Diabetes is not optimally controlled, hemoglobin A1c is 7.7%  Per patient report, glucose values likely rising throughout the day    Patient reports difficulty with MDI when he is working.  He works as a .  Discussed potential to repeat C-peptide and if he has continued endogenous insulin production consider changing mealtime coverage to a GLP-1 receptor agonist.  We did discuss potential for gastrointestinal side effects with this medication.  Patient voiced understanding.  Consider addition of GLP-1 with concurrent reduction of mealtime insulin pending results.  Continue Lantus 15 units daily  Increase Humalog to 6 units with meals plus correction 1 per 50 greater than 150  Discussed potential benefits of continuous glucose monitoring.  Dexcom G7 was sent to pharmacy.  Update screening labs today  Patient reports eye exam is up-to-date from year,  approximately January 2024    2.  Hyperlipidemia, unspecified hyperlipidemia type.  Update lipid panel, patient will continue rosuvastatin    Other orders  -     Insulin Glargine (Lantus SoloStar) 100 UNIT/ML injection pen; 15 units daily  Dispense: 15 mL; Refill: 5  -     Insulin Lispro, 1 Unit Dial, (HUMALOG) 100 UNIT/ML solution pen-injector; INJECT UNDER THE SKIN AT MEAL TIMES AND PER CORRECTIONAL SCALE. MAX DAILY DOSE OF 30 UNITS.  Dispense: 15 mL; Refill: 5  -     Continuous Glucose Sensor (Dexcom G7 Sensor) misc; Use 1 each Every 10 (Ten) Days.  Dispense: 3 each; Refill: 5         Return in about 3 months (around 12/5/2024). The patient was instructed to contact the clinic with any interval questions or concerns.    Electronically signed by: Jil Ledesma MD   Endocrinologist    Dictated Utilizing Dragon Dictation     Pulses equal bilaterally, no edema present.

## 2024-09-06 LAB
ALBUMIN SERPL-MCNC: 4.4 G/DL (ref 3.5–5.2)
ALBUMIN UR-MCNC: <1.2 MG/DL
ALBUMIN/GLOB SERPL: 1.6 G/DL
ALP SERPL-CCNC: 127 U/L (ref 39–117)
ALT SERPL W P-5'-P-CCNC: 26 U/L (ref 1–41)
ANION GAP SERPL CALCULATED.3IONS-SCNC: 8.4 MMOL/L (ref 5–15)
AST SERPL-CCNC: 17 U/L (ref 1–40)
BILIRUB SERPL-MCNC: 0.4 MG/DL (ref 0–1.2)
BUN SERPL-MCNC: 12 MG/DL (ref 6–20)
BUN/CREAT SERPL: 12.9 (ref 7–25)
CALCIUM SPEC-SCNC: 9.7 MG/DL (ref 8.6–10.5)
CHLORIDE SERPL-SCNC: 94 MMOL/L (ref 98–107)
CO2 SERPL-SCNC: 28.6 MMOL/L (ref 22–29)
CREAT SERPL-MCNC: 0.93 MG/DL (ref 0.76–1.27)
CREAT UR-MCNC: 47.9 MG/DL
EGFRCR SERPLBLD CKD-EPI 2021: 103.2 ML/MIN/1.73
GLOBULIN UR ELPH-MCNC: 2.8 GM/DL
GLUCOSE SERPL-MCNC: 147 MG/DL (ref 65–99)
MICROALBUMIN/CREAT UR: NORMAL MG/G{CREAT}
POTASSIUM SERPL-SCNC: 4.2 MMOL/L (ref 3.5–5.2)
PROT SERPL-MCNC: 7.2 G/DL (ref 6–8.5)
SODIUM SERPL-SCNC: 131 MMOL/L (ref 136–145)

## 2024-09-07 LAB — C PEPTIDE SERPL-MCNC: 1.3 NG/ML (ref 1.1–4.4)

## 2024-09-12 ENCOUNTER — PRIOR AUTHORIZATION (OUTPATIENT)
Dept: ENDOCRINOLOGY | Facility: CLINIC | Age: 44
End: 2024-09-12
Payer: COMMERCIAL

## 2024-09-13 NOTE — TELEPHONE ENCOUNTER
Eric Ramirez (Key: [id number])  PA Case ID #: [id number]  Rx #: [id number]  Need Help? Call us at [phone]  Outcome  Approved today by [employer] 2017  PA Case: [id number], Status: Approved, Coverage Starts on: 9/13/2024 12:00:00 AM, Coverage Ends on: 9/13/2025 12:00:00 AM.  Authorization Expiration Date: 9/12/2025  Drug  Mounjaro 2.5MG/0.5ML pen-injectors  ePA cloud logo  Form  [employer] Electronic PA Form (2017 NCPDP)  Original Claim Info  75    Phar notified

## 2024-10-03 ENCOUNTER — PATIENT MESSAGE (OUTPATIENT)
Dept: INTERNAL MEDICINE | Facility: CLINIC | Age: 44
End: 2024-10-03
Payer: COMMERCIAL

## 2024-10-03 DIAGNOSIS — F41.9 ANXIETY: ICD-10-CM

## 2024-10-03 RX ORDER — SODIUM, POTASSIUM,MAG SULFATES 17.5-3.13G
SOLUTION, RECONSTITUTED, ORAL ORAL
Qty: 354 ML | Refills: 0 | OUTPATIENT
Start: 2024-10-03

## 2024-10-03 RX ORDER — BUPROPION HYDROCHLORIDE 300 MG/1
300 TABLET ORAL DAILY
Qty: 90 TABLET | Refills: 0 | Status: SHIPPED | OUTPATIENT
Start: 2024-10-03

## 2024-10-03 NOTE — TELEPHONE ENCOUNTER
From: Zeny GRIDER  To: Eric Ramirez  Sent: 10/3/2024 8:58 AM EDT  Subject: Med refill    We recently received your message to refill your medication(s). Our records indicate that you did not schedule a follow up appointment with your provider at your last visit on 07/13/2023. The medication that you are on requires a follow up appointment for additional refills. Patient was to schedule on or around 01/13/2024 for 6 month follow up

## 2024-10-03 NOTE — TELEPHONE ENCOUNTER
LOV 07/13/2023  NOV Not scheduled yet  Follow Up:   Return in about 6 months (around 1/13/2024) for Annual.    We recently received your message to refill your medication(s).  Our records indicate that you did not schedule a follow up appointment with your provider at your last visit on 07/13/2023. The medication that you are on requires a follow up appointment for additional refills. Patient was to schedule on or around 01/13/2024 for 6 month follow up

## 2024-12-09 RX ORDER — TIRZEPATIDE 2.5 MG/.5ML
INJECTION, SOLUTION SUBCUTANEOUS
Qty: 2 ML | Refills: 0 | Status: SHIPPED | OUTPATIENT
Start: 2024-12-09

## 2024-12-09 NOTE — TELEPHONE ENCOUNTER
Rx Refill Note  Requested Prescriptions     Pending Prescriptions Disp Refills    Mounjaro 2.5 MG/0.5ML solution auto-injector [Pharmacy Med Name: MOUNJARO 2.5 MG/0.5 ML PEN] 2 mL      Sig: INJECT 2.5 MG UNDER THE SKIN ONCE WEEKLY        Last office visit with prescribing clinician: 9/5/2024      Next office visit with prescribing clinician: 12/20/2024       Giana Shah (Jodi)  12/09/24, 11:02 EST

## 2024-12-20 ENCOUNTER — OFFICE VISIT (OUTPATIENT)
Dept: ENDOCRINOLOGY | Facility: CLINIC | Age: 44
End: 2024-12-20
Payer: COMMERCIAL

## 2024-12-20 VITALS
HEART RATE: 96 BPM | OXYGEN SATURATION: 95 % | HEIGHT: 65 IN | SYSTOLIC BLOOD PRESSURE: 138 MMHG | BODY MASS INDEX: 31.69 KG/M2 | WEIGHT: 190.2 LBS | DIASTOLIC BLOOD PRESSURE: 88 MMHG

## 2024-12-20 DIAGNOSIS — E11.65 TYPE 2 DIABETES MELLITUS WITH HYPERGLYCEMIA, WITH LONG-TERM CURRENT USE OF INSULIN: Primary | ICD-10-CM

## 2024-12-20 DIAGNOSIS — Z79.4 TYPE 2 DIABETES MELLITUS WITH HYPERGLYCEMIA, WITH LONG-TERM CURRENT USE OF INSULIN: Primary | ICD-10-CM

## 2024-12-20 DIAGNOSIS — E78.5 HYPERLIPIDEMIA, UNSPECIFIED HYPERLIPIDEMIA TYPE: ICD-10-CM

## 2024-12-20 LAB
EXPIRATION DATE: NORMAL
EXPIRATION DATE: NORMAL
GLUCOSE BLDC GLUCOMTR-MCNC: 111 MG/DL (ref 70–130)
HBA1C MFR BLD: 5.1 % (ref 4.5–5.7)
Lab: NORMAL
Lab: NORMAL

## 2024-12-20 RX ORDER — TIRZEPATIDE 5 MG/.5ML
5 INJECTION, SOLUTION SUBCUTANEOUS WEEKLY
Qty: 2 ML | Refills: 5 | Status: SHIPPED | OUTPATIENT
Start: 2024-12-20

## 2024-12-20 RX ORDER — INSULIN LISPRO 100 [IU]/ML
INJECTION, SOLUTION INTRAVENOUS; SUBCUTANEOUS
Qty: 15 ML | Refills: 5 | Status: SHIPPED | OUTPATIENT
Start: 2024-12-20

## 2024-12-20 RX ORDER — INSULIN GLARGINE 100 [IU]/ML
INJECTION, SOLUTION SUBCUTANEOUS
Qty: 15 ML | Refills: 5 | Status: SHIPPED | OUTPATIENT
Start: 2024-12-20

## 2024-12-20 NOTE — PROGRESS NOTES
"Chief Complaint   Patient presents with    Diabetes        HPI   Eric Ramirez is a 44 y.o. male had concerns including Diabetes.      Patient started Mounjaro in the interim since last visit.  He reports he is tolerating this medication well.  He reports glucose values have been much improved.  He is monitoring glucose via Dexcom.  He does report a few instances of overnight hypoglycemia since last visit.    Current diabetes medications include the following:  Lantus 15 units daily  Humalog 5 units with meals plus correction 1 per 50 greater than 150  Mounjaro 2.5 mg weekly    Patient is taking rosuvastatin 10 mg daily for hyperlipidemia.    The following portions of the patient's history were reviewed and updated as appropriate: allergies, current medications, and past social history.    Review of Systems   Gastrointestinal:  Negative for constipation, nausea and vomiting.      /88 (BP Location: Left arm, Patient Position: Sitting)   Pulse 96   Ht 165.1 cm (65\")   Wt 86.3 kg (190 lb 3.2 oz)   SpO2 95%   BMI 31.65 kg/m²      Physical Exam      Constitutional:  well developed; well nourished  no acute distress  appears stated age   ENT/Thyroid: not examined   Eyes: Conjunctiva: clear   Respiratory:  breathing is unlabored  clear to auscultation bilaterally   Cardiovascular:  regular rate and rhythm   Chest:  Not performed.   Abdomen: Not performed.   : Not performed.   Musculoskeletal: Not performed   Skin: not performed.   Neuro: mental status, speech normal   Psych: mood and affect are within normal limits       Labs/Imaging  A1C:  Lab Results   Component Value Date    HGBA1C 5.1 12/20/2024    HGBA1C 7.7 (A) 09/05/2024      Glucose:  Lab Results   Component Value Date    POCGLU 111 12/20/2024      CMP:  Lab Results   Component Value Date    GLUCOSE 147 (H) 09/05/2024    BUN 12 09/05/2024    CREATININE 0.93 09/05/2024     (L) 09/05/2024    K 4.2 09/05/2024    CL 94 (L) 09/05/2024    " CALCIUM 9.7 09/05/2024    PROTEINTOT 7.2 09/05/2024    ALBUMIN 4.4 09/05/2024    ALT 26 09/05/2024    AST 17 09/05/2024    ALKPHOS 127 (H) 09/05/2024    BILITOT 0.4 09/05/2024    GLOB 2.8 09/05/2024    AGRATIO 1.6 09/05/2024    BCR 12.9 09/05/2024    ANIONGAP 8.4 09/05/2024    EGFR 103.2 09/05/2024      Lipid Panel:  Lab Results   Component Value Date    CHOL 147 09/05/2024    CHLPL 209 (H) 08/26/2015    TRIG 66 09/05/2024    HDL 57 09/05/2024    LDL 77 09/05/2024      Urine Microalbumin:  Lab Results   Component Value Date    MICROALBUR <1.2 09/05/2024      TSH:  Lab Results   Component Value Date    TSH 2.550 09/05/2024       Dexcom downloaded for review for dates ranging December 7 through December 20, 2024, average glucose is 140 nondistended deviation of 37, GMI 6.6%.  86% of data is within target range, 1% low, 12% high, 1% very high.  Hyperglycemia is generally postprandial.  Patient with borderline low glucose values overnight.    Diagnoses and all orders for this visit:    1. Type 2 diabetes mellitus with hyperglycemia, with long-term current use of insulin (Primary)  -     POC Glucose, Blood  -     POC Glycosylated Hemoglobin (Hb A1C)  Diabetes is well-controlled with hemoglobin A1c 5.1%  CGM containing 72 hours of glucose data was downloaded and reviewed  Plan to reduce Lantus to 12 units daily given overnight hypoglycemia.  Increase Mounjaro to 5 mg weekly.   Patient will discontinue regular use of Humalog when Mounjaro dose is increased.  He will continue correctional Insulin 1 unit per 50 greater than 150, as needed.  Patient previously did not tolerate metformin.  Screening labs are up-to-date from September 2024  Eye exam is up-to-date, per patient      2. Hyperlipidemia, unspecified hyperlipidemia type  Lipid panel is up-to-date from September 2024, patient will continue rosuvastatin.    Other orders  -     Insulin Lispro, 1 Unit Dial, (HUMALOG) 100 UNIT/ML solution pen-injector; INJECT UNDER THE  SKIN AT MEAL TIMES AND PER CORRECTIONAL SCALE. MAX DAILY DOSE OF 30 UNITS.  Dispense: 15 mL; Refill: 5  -     Insulin Glargine (Lantus SoloStar) 100 UNIT/ML injection pen; 12 units daily  Dispense: 15 mL; Refill: 5  -     Tirzepatide (Mounjaro) 5 MG/0.5ML solution auto-injector; Inject 5 mg under the skin into the appropriate area as directed 1 (One) Time Per Week.  Dispense: 2 mL; Refill: 5         Return in about 4 months (around 4/20/2025). The patient was instructed to contact the clinic with any interval questions or concerns.    Electronically signed by: Jil Ledesma MD   Endocrinologist    Dictated Utilizing Dragon Dictation

## 2025-01-28 ENCOUNTER — OFFICE VISIT (OUTPATIENT)
Dept: INTERNAL MEDICINE | Facility: CLINIC | Age: 45
End: 2025-01-28
Payer: COMMERCIAL

## 2025-01-28 VITALS
SYSTOLIC BLOOD PRESSURE: 118 MMHG | BODY MASS INDEX: 31.18 KG/M2 | RESPIRATION RATE: 20 BRPM | WEIGHT: 187.38 LBS | HEART RATE: 88 BPM | TEMPERATURE: 97.7 F | DIASTOLIC BLOOD PRESSURE: 64 MMHG

## 2025-01-28 DIAGNOSIS — K21.9 GASTROESOPHAGEAL REFLUX DISEASE, UNSPECIFIED WHETHER ESOPHAGITIS PRESENT: ICD-10-CM

## 2025-01-28 DIAGNOSIS — Z79.4 TYPE 2 DIABETES MELLITUS WITH DIABETIC AUTONOMIC NEUROPATHY, WITH LONG-TERM CURRENT USE OF INSULIN: ICD-10-CM

## 2025-01-28 DIAGNOSIS — E11.43 TYPE 2 DIABETES MELLITUS WITH DIABETIC AUTONOMIC NEUROPATHY, WITH LONG-TERM CURRENT USE OF INSULIN: ICD-10-CM

## 2025-01-28 DIAGNOSIS — I10 PRIMARY HYPERTENSION: ICD-10-CM

## 2025-01-28 DIAGNOSIS — F41.9 ANXIETY: Primary | ICD-10-CM

## 2025-01-28 PROCEDURE — 99214 OFFICE O/P EST MOD 30 MIN: CPT | Performed by: NURSE PRACTITIONER

## 2025-01-28 RX ORDER — BUPROPION HYDROCHLORIDE 300 MG/1
300 TABLET ORAL DAILY
Qty: 90 TABLET | Refills: 1 | Status: SHIPPED | OUTPATIENT
Start: 2025-01-28

## 2025-01-28 NOTE — PATIENT INSTRUCTIONS
MyPlate from USDA  MyPlate is an outline of a general healthy diet based on the Dietary Guidelines for Americans, 5318-8324, from the U.S. Department of Agriculture (USDA). It sets guidelines for how much food you should eat from each food group based on your age, sex, and level of physical activity.  What are tips for following MyPlate?  To follow MyPlate recommendations:  Eat a wide variety of fruits and vegetables, grains, and protein foods.  Serve smaller portions and eat less food throughout the day.  Limit portion sizes to avoid overeating.  Enjoy your food.  Get at least 150 minutes of exercise every week. This is about 30 minutes each day, 5 or more days per week.  It can be difficult to have every meal look like MyPlate. Think about MyPlate as eating guidelines for an entire day, rather than each individual meal.  Fruits and vegetables  Make one half of your plate fruits and vegetables.  Eat many different colors of fruits and vegetables each day.  For a 2,000-calorie daily food plan, eat:  2½ cups of vegetables every day.  2 cups of fruit every day.  1 cup is equal to:  1 cup raw or cooked vegetables.  1 cup raw fruit.  1 medium-sized orange, apple, or banana.  1 cup 100% fruit or vegetable juice.  2 cups raw leafy greens, such as lettuce, spinach, or kale.  ½ cup dried fruit.  Grains  One fourth of your plate should be grains.  Make at least half of the grains you eat each day whole grains.  For a 2,000-calorie daily food plan, eat 6 oz of grains every day.  1 oz is equal to:  1 slice bread.  1 cup cereal.  ½ cup cooked rice, cereal, or pasta.  Protein  One fourth of your plate should be protein.  Eat a wide variety of protein foods, including meat, poultry, fish, eggs, beans, nuts, and tofu.  For a 2,000-calorie daily food plan, eat 5½ oz of protein every day.  1 oz is equal to:  1 oz meat, poultry, or fish.  ¼ cup cooked beans.  1 egg.  ½ oz nuts or seeds.  1 Tbsp peanut butter.  Dairy  Drink fat-free  or low-fat (1%) milk.  Eat or drink dairy as a side to meals.  For a 2,000-calorie daily food plan, eat or drink 3 cups of dairy every day.  1 cup is equal to:  1 cup milk, yogurt, cottage cheese, or soy milk (soy beverage).  2 oz processed cheese.  1½ oz natural cheese.  Fats, oils, salt, and sugars  Only small amounts of oils are recommended.  Avoid foods that are high in calories and low in nutritional value (empty calories), like foods high in fat or added sugars.  Choose foods that are low in salt (sodium). Choose foods that have less than 140 milligrams (mg) of sodium per serving.  Drink water instead of sugary drinks. Drink enough fluid to keep your urine pale yellow.  Where to find support  Work with your health care provider or a dietitian to develop a customized eating plan that is right for you.  Download an dinh (mobile application) to help you track your daily food intake.  Where to find more information  USDA: ChooseMyPlate.gov  Summary  MyPlate is a general guideline for healthy eating from the USDA. It is based on the Dietary Guidelines for Americans, 1677-0080.  In general, fruits and vegetables should take up one half of your plate, grains should take up one fourth of your plate, and protein should take up one fourth of your plate.  This information is not intended to replace advice given to you by your health care provider. Make sure you discuss any questions you have with your health care provider.  Document Revised: 11/08/2021 Document Reviewed: 11/08/2021  [employer] Patient Education © 2022 [employer]

## 2025-01-28 NOTE — PROGRESS NOTES
Patient Name: Eric Ramirez  : 1980   MRN: [id number]     Chief Complaint:    Chief Complaint   Patient presents with    Depression     Follow up   refill        History of Present Illness: Eric Ramirez is a 44 y.o. male.  History of Present Illness  The patient presents for evaluation of anxiety, GERD, and diabetes.    Anxiety  - He is currently on a regimen of Wellbutrin 300 mg, which he reports as being effective in managing his anxiety symptoms.  - He reports no depressive symptoms, maintains a positive mood, and has no suicidal ideation.  - His sleep pattern is normal.    Diabetes  - He is under the care of Dr. Ledesma for his diabetes management.  - He acknowledges the need for an ophthalmological examination due to his diabetic condition.    GERD  - He is also on omeprazole, which he procures from [employer] at a lower cost, for the management of his GERD.  - He reports no dysphagia.  - Controls symptoms well.     Supplemental information: His blood pressure is well-regulated under the supervision of Dr. Sommers.    MEDICATIONS  Current: Wellbutrin, omeprazole         Subjective     Review of System: Review of Systems     Medications:     Current Outpatient Medications:     buPROPion XL (WELLBUTRIN XL) 300 MG 24 hr tablet, Take 1 tablet by mouth Daily., Disp: 90 tablet, Rfl: 1    Cetirizine HCl (ZYRTEC ALLERGY PO), Take 1 tablet by mouth daily., Disp: , Rfl:     Continuous Glucose Sensor (Dexcom G7 Sensor) misc, Use 1 each Every 10 (Ten) Days., Disp: 3 each, Rfl: 5    FOLIC ACID PO, Take 1 tablet by mouth Daily., Disp: , Rfl:     hydroCHLOROthiazide 25 MG tablet, Take 1 tablet by mouth Daily., Disp: 90 tablet, Rfl: 3    Insulin Glargine (Lantus SoloStar) 100 UNIT/ML injection pen, 12 units daily, Disp: 15 mL, Rfl: 5    Insulin Lispro, 1 Unit Dial, (HUMALOG) 100 UNIT/ML solution pen-injector, INJECT UNDER THE SKIN AT MEAL TIMES AND PER CORRECTIONAL SCALE. MAX DAILY DOSE OF 30 UNITS., Disp:  15 mL, Rfl: 5    Insulin Pen Needle 31G X 5 MM misc, 1 each Daily. Use to inject insulin, Disp: 100 each, Rfl: 11    lisinopril (PRINIVIL,ZESTRIL) 10 MG tablet, TAKE 1 TABLET BY MOUTH DAILY, Disp: 90 tablet, Rfl: 3    omeprazole (priLOSEC) 20 MG capsule, TAKE ONE CAPSULE BY MOUTH DAILY, Disp: 90 capsule, Rfl: 3    Probiotic Product (PROBIOTIC DAILY PO), Take 1 tablet by mouth Daily., Disp: , Rfl:     sulfaSALAzine (AZULFIDINE) 500 MG tablet, Take 4 tablets by mouth 2 (Two) Times a Day., Disp: 240 tablet, Rfl: 11    Tirzepatide (Mounjaro) 5 MG/0.5ML solution auto-injector, Inject 5 mg under the skin into the appropriate area as directed 1 (One) Time Per Week., Disp: 2 mL, Rfl: 5    ondansetron ODT (ZOFRAN-ODT) 4 MG disintegrating tablet, Place 1 tablet on the tongue Every 8 (Eight) Hours As Needed for Nausea or Vomiting for up to 6 doses. (Patient not taking: Reported on 1/28/2025), Disp: 6 tablet, Rfl: 0    rosuvastatin (CRESTOR) 10 MG tablet, Take 1 tablet by mouth Daily., Disp: 90 tablet, Rfl: 3    Allergies:   Allergies   Allergen Reactions    Levofloxacin Itching     Throat itching, eyes watering    Buspirone Itching    Flagyl [Metronidazole] Hives and Swelling       Objective     Physical Exam:   Vital Signs:   Vitals:    01/28/25 0840   BP: 118/64   BP Location: Right arm   Patient Position: Sitting   Cuff Size: Adult   Pulse: 88   Resp: 20   Temp: 97.7 °F (36.5 °C)   TempSrc: Temporal   Weight: 85 kg (187 lb 6 oz)     Body mass index is 31.18 kg/m². BMI is >= 30 and <35. (Class 1 Obesity). The following options were offered after discussion;: weight loss educational material (shared in after visit summary) and information on healthy weight added to patient's after visit summary       Physical Exam  Physical Exam  Lungs are clear.  Heart rate is regular.       Results       Assessment / Plan      Assessment/Plan:   Diagnoses and all orders for this visit:    1. Anxiety (Primary)  -     buPROPion XL (WELLBUTRIN  XL) 300 MG 24 hr tablet; Take 1 tablet by mouth Daily.  Dispense: 90 tablet; Refill: 1    2. Gastroesophageal reflux disease, unspecified whether esophagitis present    3. Type 2 diabetes mellitus with diabetic autonomic neuropathy, with long-term current use of insulin    4. Primary hypertension    5. BMI 31.0-31.9,adult       Assessment & Plan  1. Anxiety  - Symptoms well-managed with current dosage of Wellbutrin 300 mg  - No depression symptoms, sleeping issues, mood disturbances, or suicidal thoughts reported  - Prescription refill for Wellbutrin 300 mg provided for 6 months    2. Diabetes Mellitus  - Under the care of Dr. Ledesma for diabetes management  - Diabetes well-controlled  - Diabetic eye exam to be scheduled as patient believes he is due for it    3. Gastroesophageal Reflux Disease (GERD)  - Managing GERD with omeprazole purchased from [employer]  - No difficulty swallowing reported; controls symptoms.     4. Hypertension  - Blood pressure well-regulated under the supervision of Dr. Sommers    Follow-up  - Patient to follow up in 6 months for a physical examination or earlier if necessary       Explained and discussed patient's condition and plan of care including labs and radiology that may be ordered.  Discussed when to follow-up.  Discussed possible red flags and how to follow-up with those.  Viewed patient's medications and discussed common side effects and symptoms to report. Patient to continue current medications as advised.  Be compliant with medications. Patient to call clinic if they have any worsening, no improvement, does not tolerate medication, or any future concerns about treatment.  Questions and concerns addressed at this appointment.  Patient to report to ER for emergencies.  Patient verbalized an understanding and agreement with plan of care.      Follow Up:   Return in about 6 months (around 7/28/2025) for Annual.  Patient or patient representative verbalized consent for the use of  Ambient Listening during the visit with  FERNANDA Terrell for chart documentation. 1/28/2025  09:15 EST    FERNANDA Terrell  [facility]

## 2025-02-11 ENCOUNTER — PATIENT ROUNDING (BHMG ONLY) (OUTPATIENT)
Dept: URGENT CARE | Facility: CLINIC | Age: 45
End: 2025-02-11
Payer: COMMERCIAL

## 2025-02-11 NOTE — ED NOTES
Thank you for letting us care for you in your recent visit to our urgent care center. We would love to hear about your experience with us. Was this the first time you have visited our location?    We’re always looking for ways to make our patients’ experiences even better. Do you have any recommendations on ways we may improve?     I appreciate you taking the time to respond. Please be on the lookout for a survey about your recent visit from [employer] via text or email. We would greatly appreciate if you could fill that out and turn it back in. We want your voice to be heard and we value your feedback.   Thank you for choosing [facility] for your healthcare needs.

## 2025-02-19 ENCOUNTER — TELEPHONE (OUTPATIENT)
Dept: INTERNAL MEDICINE | Facility: CLINIC | Age: 45
End: 2025-02-19

## 2025-02-19 ENCOUNTER — OFFICE VISIT (OUTPATIENT)
Dept: INTERNAL MEDICINE | Facility: CLINIC | Age: 45
End: 2025-02-19
Payer: COMMERCIAL

## 2025-02-19 ENCOUNTER — HOSPITAL ENCOUNTER (OUTPATIENT)
Dept: GENERAL RADIOLOGY | Facility: HOSPITAL | Age: 45
Discharge: HOME | End: 2025-02-19
Admitting: NURSE PRACTITIONER
Payer: COMMERCIAL

## 2025-02-19 VITALS
HEIGHT: 65 IN | OXYGEN SATURATION: 95 % | DIASTOLIC BLOOD PRESSURE: 84 MMHG | TEMPERATURE: 97.3 F | RESPIRATION RATE: 18 BRPM | WEIGHT: 180.8 LBS | SYSTOLIC BLOOD PRESSURE: 118 MMHG | HEART RATE: 94 BPM | BODY MASS INDEX: 30.12 KG/M2

## 2025-02-19 DIAGNOSIS — R09.02 HYPOXIA: Primary | ICD-10-CM

## 2025-02-19 DIAGNOSIS — Z79.4 TYPE 2 DIABETES MELLITUS WITH DIABETIC AUTONOMIC NEUROPATHY, WITH LONG-TERM CURRENT USE OF INSULIN: ICD-10-CM

## 2025-02-19 DIAGNOSIS — R05.1 ACUTE COUGH: Primary | ICD-10-CM

## 2025-02-19 DIAGNOSIS — J98.8 RESPIRATORY INFECTION: ICD-10-CM

## 2025-02-19 DIAGNOSIS — E11.43 TYPE 2 DIABETES MELLITUS WITH DIABETIC AUTONOMIC NEUROPATHY, WITH LONG-TERM CURRENT USE OF INSULIN: ICD-10-CM

## 2025-02-19 DIAGNOSIS — R06.2 WHEEZING: ICD-10-CM

## 2025-02-19 DIAGNOSIS — R09.02 HYPOXIA: ICD-10-CM

## 2025-02-19 DIAGNOSIS — R10.9 FLANK PAIN: ICD-10-CM

## 2025-02-19 DIAGNOSIS — R42 DIZZINESS: ICD-10-CM

## 2025-02-19 LAB
ALBUMIN SERPL-MCNC: 4.8 G/DL (ref 3.5–5.2)
ALBUMIN/GLOB SERPL: 1.5 G/DL
ALP SERPL-CCNC: 148 U/L (ref 39–117)
ALT SERPL W P-5'-P-CCNC: 44 U/L (ref 1–41)
ANION GAP SERPL CALCULATED.3IONS-SCNC: 11 MMOL/L (ref 5–15)
AST SERPL-CCNC: 34 U/L (ref 1–40)
B PARAPERT DNA SPEC QL NAA+PROBE: NOT DETECTED
B PERT DNA SPEC QL NAA+PROBE: NOT DETECTED
BASOPHILS # BLD AUTO: 0.07 10*3/MM3 (ref 0–0.2)
BASOPHILS NFR BLD AUTO: 1.4 % (ref 0–1.5)
BILIRUB SERPL-MCNC: 0.7 MG/DL (ref 0–1.2)
BUN SERPL-MCNC: 17 MG/DL (ref 6–20)
BUN/CREAT SERPL: 15.6 (ref 7–25)
C PNEUM DNA NPH QL NAA+NON-PROBE: NOT DETECTED
CALCIUM SPEC-SCNC: 9.6 MG/DL (ref 8.6–10.5)
CHLORIDE SERPL-SCNC: 87 MMOL/L (ref 98–107)
CK SERPL-CCNC: 45 U/L (ref 20–200)
CO2 SERPL-SCNC: 29 MMOL/L (ref 22–29)
CREAT SERPL-MCNC: 1.09 MG/DL (ref 0.76–1.27)
CRP SERPL-MCNC: 0.33 MG/DL (ref 0–0.5)
D DIMER PPP FEU-MCNC: <0.27 MCGFEU/ML (ref 0–0.5)
D-LACTATE SERPL-SCNC: 1.8 MMOL/L (ref 0.5–2)
DEPRECATED RDW RBC AUTO: 43.2 FL (ref 37–54)
EGFRCR SERPLBLD CKD-EPI 2021: 84.8 ML/MIN/1.73
EOSINOPHIL # BLD AUTO: 0.03 10*3/MM3 (ref 0–0.4)
EOSINOPHIL NFR BLD AUTO: 0.6 % (ref 0.3–6.2)
ERYTHROCYTE [DISTWIDTH] IN BLOOD BY AUTOMATED COUNT: 13.1 % (ref 12.3–15.4)
ERYTHROCYTE [SEDIMENTATION RATE] IN BLOOD: 8 MM/HR (ref 0–15)
FLUAV H1 2009 PAND RNA NPH QL NAA+PROBE: DETECTED
FLUBV RNA ISLT QL NAA+PROBE: NOT DETECTED
GLOBULIN UR ELPH-MCNC: 3.2 GM/DL
GLUCOSE SERPL-MCNC: 79 MG/DL (ref 65–99)
HADV DNA SPEC NAA+PROBE: NOT DETECTED
HCOV 229E RNA SPEC QL NAA+PROBE: NOT DETECTED
HCOV HKU1 RNA SPEC QL NAA+PROBE: NOT DETECTED
HCOV NL63 RNA SPEC QL NAA+PROBE: NOT DETECTED
HCOV OC43 RNA SPEC QL NAA+PROBE: NOT DETECTED
HCT VFR BLD AUTO: 47.4 % (ref 37.5–51)
HGB BLD-MCNC: 15.9 G/DL (ref 13–17.7)
HMPV RNA NPH QL NAA+NON-PROBE: NOT DETECTED
HPIV1 RNA ISLT QL NAA+PROBE: NOT DETECTED
HPIV2 RNA SPEC QL NAA+PROBE: NOT DETECTED
HPIV3 RNA NPH QL NAA+PROBE: NOT DETECTED
HPIV4 P GENE NPH QL NAA+PROBE: NOT DETECTED
IMM GRANULOCYTES # BLD AUTO: 0.02 10*3/MM3 (ref 0–0.05)
IMM GRANULOCYTES NFR BLD AUTO: 0.4 % (ref 0–0.5)
LIPASE SERPL-CCNC: 40 U/L (ref 13–60)
LYMPHOCYTES # BLD AUTO: 1.52 10*3/MM3 (ref 0.7–3.1)
LYMPHOCYTES NFR BLD AUTO: 30.5 % (ref 19.6–45.3)
M PNEUMO IGG SER IA-ACNC: NOT DETECTED
MCH RBC QN AUTO: 30.1 PG (ref 26.6–33)
MCHC RBC AUTO-ENTMCNC: 33.5 G/DL (ref 31.5–35.7)
MCV RBC AUTO: 89.6 FL (ref 79–97)
MONOCYTES # BLD AUTO: 0.55 10*3/MM3 (ref 0.1–0.9)
MONOCYTES NFR BLD AUTO: 11 % (ref 5–12)
NEUTROPHILS NFR BLD AUTO: 2.8 10*3/MM3 (ref 1.7–7)
NEUTROPHILS NFR BLD AUTO: 56.1 % (ref 42.7–76)
NRBC BLD AUTO-RTO: 0 /100 WBC (ref 0–0.2)
NT-PROBNP SERPL-MCNC: <36 PG/ML (ref 0–450)
PLATELET # BLD AUTO: 500 10*3/MM3 (ref 140–450)
PMV BLD AUTO: 9.3 FL (ref 6–12)
POTASSIUM SERPL-SCNC: 4 MMOL/L (ref 3.5–5.2)
PROT SERPL-MCNC: 8 G/DL (ref 6–8.5)
RBC # BLD AUTO: 5.29 10*6/MM3 (ref 4.14–5.8)
RHINOVIRUS RNA SPEC NAA+PROBE: NOT DETECTED
RSV RNA NPH QL NAA+NON-PROBE: NOT DETECTED
SARS-COV-2 RNA NPH QL NAA+NON-PROBE: NOT DETECTED
SODIUM SERPL-SCNC: 127 MMOL/L (ref 136–145)
T4 FREE SERPL-MCNC: 1.46 NG/DL (ref 0.92–1.68)
TSH SERPL DL<=0.05 MIU/L-ACNC: 4.22 UIU/ML (ref 0.27–4.2)
WBC NRBC COR # BLD AUTO: 4.99 10*3/MM3 (ref 3.4–10.8)

## 2025-02-19 PROCEDURE — 83605 ASSAY OF LACTIC ACID: CPT | Performed by: NURSE PRACTITIONER

## 2025-02-19 PROCEDURE — 85379 FIBRIN DEGRADATION QUANT: CPT | Performed by: NURSE PRACTITIONER

## 2025-02-19 PROCEDURE — 80050 GENERAL HEALTH PANEL: CPT | Performed by: NURSE PRACTITIONER

## 2025-02-19 PROCEDURE — 99214 OFFICE O/P EST MOD 30 MIN: CPT | Performed by: NURSE PRACTITIONER

## 2025-02-19 PROCEDURE — 82550 ASSAY OF CK (CPK): CPT | Performed by: NURSE PRACTITIONER

## 2025-02-19 PROCEDURE — 83880 ASSAY OF NATRIURETIC PEPTIDE: CPT | Performed by: NURSE PRACTITIONER

## 2025-02-19 PROCEDURE — 86140 C-REACTIVE PROTEIN: CPT | Performed by: NURSE PRACTITIONER

## 2025-02-19 PROCEDURE — 0202U NFCT DS 22 TRGT SARS-COV-2: CPT | Performed by: NURSE PRACTITIONER

## 2025-02-19 PROCEDURE — 85652 RBC SED RATE AUTOMATED: CPT | Performed by: NURSE PRACTITIONER

## 2025-02-19 PROCEDURE — 84439 ASSAY OF FREE THYROXINE: CPT | Performed by: NURSE PRACTITIONER

## 2025-02-19 PROCEDURE — 83690 ASSAY OF LIPASE: CPT | Performed by: NURSE PRACTITIONER

## 2025-02-19 PROCEDURE — 71046 X-RAY EXAM CHEST 2 VIEWS: CPT

## 2025-02-19 RX ORDER — PREDNISONE 20 MG/1
20 TABLET ORAL 2 TIMES DAILY
Qty: 10 TABLET | Refills: 0 | Status: SHIPPED | OUTPATIENT
Start: 2025-02-19

## 2025-02-19 RX ORDER — CEFDINIR 300 MG/1
300 CAPSULE ORAL 2 TIMES DAILY
Qty: 20 CAPSULE | Refills: 0 | Status: SHIPPED | OUTPATIENT
Start: 2025-02-19

## 2025-02-19 NOTE — PROGRESS NOTES
Patient Name: Eric Ramirez  : 1980   MRN: [id number]     Chief Complaint:    Chief Complaint   Patient presents with    Shortness of Breath     Dizziness and back (flank pain) also. Has been to [facility] 2 times          History of Present Illness: Eric Ramirez is a 44 y.o. male.  History of Present Illness  The patient is a 44-year-old male who presents for evaluation of right flank pain, fever, cough, dizziness, and sore throat.    Right Flank Pain  - Approximately 10 to 12 days ago, he experienced discomfort in his right flank, prompting a visit to urgent care.  - He was informed that his symptoms were indicative of kidney stones  - He was advised to monitor his condition over the next few days.  - His condition deteriorated, with an increase in fever to 101.5 degrees.  - His last recorded fever was on Wednesday.  - He reports no rashes, nausea, vomiting, diarrhea, or abdominal pain.  - He has been maintaining adequate hydration.  - He underwent influenza and COVID-19 testing on the fourth or fifth day of symptom onset.    Persistent Pain and Suspected Shingles  - Subsequently, he sought medical attention at [facility] due to persistent pain, which was suspected to be shingles based on the radiating nature of the pain around his waist.  - He also reported a burning sensation in his stomach and itching.  - He was prescribed a Z-Mil and a course of prednisone, which he completed yesterday morning.    Cough and Dizziness  - Despite the treatment, he continues to experience a cough and significant dizziness, to the point of near syncope with minimal exertion.  - He reports mild chest pain and palpitations, occasional shortness of breath, and a sore throat that was particularly bothersome last night.  - His cough is productive, but he has not been able to expel any nasal secretions.  - He also reports intermittent ear congestion.  - He has been checking for rashes multiple times a day but has not  observed any.  - He reports no history of lung disease or smoking.    Supplemental Information  - He has a history of ulcerative colitis, for which he takes sulfasalazine, and he has previously experienced pancreatitis following gallbladder removal.  - He is allergic to FLAGYL and BUSPAR.  - He takes a daily probiotic for his ulcerative colitis.    SOCIAL HISTORY  He does not smoke.    FAMILY HISTORY  His sister had pulmonary fibrosis with an autoimmune disease.    ALLERGIES  He is allergic to FLAGYL and BUSPAR.    MEDICATIONS  Current: Sulfasalazine, probiotic  Past: Z-Mil, prednisone         Subjective     Review of System: Review of Systems     Medications:     Current Outpatient Medications:     buPROPion XL (WELLBUTRIN XL) 300 MG 24 hr tablet, Take 1 tablet by mouth Daily., Disp: 90 tablet, Rfl: 1    Cetirizine HCl (ZYRTEC ALLERGY PO), Take 1 tablet by mouth daily., Disp: , Rfl:     Continuous Glucose Sensor (Dexcom G7 Sensor) misc, Use 1 each Every 10 (Ten) Days., Disp: 3 each, Rfl: 5    FOLIC ACID PO, Take 1 tablet by mouth Daily., Disp: , Rfl:     hydroCHLOROthiazide 25 MG tablet, Take 1 tablet by mouth Daily., Disp: 90 tablet, Rfl: 3    Insulin Glargine (Lantus SoloStar) 100 UNIT/ML injection pen, 12 units daily, Disp: 15 mL, Rfl: 5    Insulin Lispro, 1 Unit Dial, (HUMALOG) 100 UNIT/ML solution pen-injector, INJECT UNDER THE SKIN AT MEAL TIMES AND PER CORRECTIONAL SCALE. MAX DAILY DOSE OF 30 UNITS., Disp: 15 mL, Rfl: 5    Insulin Pen Needle 31G X 5 MM misc, 1 each Daily. Use to inject insulin, Disp: 100 each, Rfl: 11    lisinopril (PRINIVIL,ZESTRIL) 10 MG tablet, TAKE 1 TABLET BY MOUTH DAILY, Disp: 90 tablet, Rfl: 3    omeprazole (priLOSEC) 20 MG capsule, TAKE ONE CAPSULE BY MOUTH DAILY, Disp: 90 capsule, Rfl: 3    Probiotic Product (PROBIOTIC DAILY PO), Take 1 tablet by mouth Daily., Disp: , Rfl:     rosuvastatin (CRESTOR) 10 MG tablet, Take 1 tablet by mouth Daily., Disp: 90 tablet, Rfl: 3     "sulfaSALAzine (AZULFIDINE) 500 MG tablet, Take 4 tablets by mouth 2 (Two) Times a Day., Disp: 240 tablet, Rfl: 11    Tirzepatide (Mounjaro) 5 MG/0.5ML solution auto-injector, Inject 5 mg under the skin into the appropriate area as directed 1 (One) Time Per Week., Disp: 2 mL, Rfl: 5    cefdinir (OMNICEF) 300 MG capsule, Take 1 capsule by mouth 2 (Two) Times a Day., Disp: 20 capsule, Rfl: 0    predniSONE (DELTASONE) 20 MG tablet, Take 1 tablet by mouth 2 (Two) Times a Day., Disp: 10 tablet, Rfl: 0    Allergies:   Allergies   Allergen Reactions    Levofloxacin Itching     Throat itching, eyes watering    Buspirone Itching    Flagyl [Metronidazole] Hives and Swelling       Objective     Physical Exam:   Vital Signs:   Vitals:    02/19/25 0951   BP: 118/84   BP Location: Right arm   Patient Position: Sitting   Cuff Size: Adult   Pulse: 94   Resp: 18   Temp: 97.3 °F (36.3 °C)   TempSrc: Infrared   SpO2: 90%   Weight: 82 kg (180 lb 12.8 oz)   Height: 165.1 cm (65\")   PainSc: 2          Physical Exam  Physical Exam  The patient appears ill.  Tympanic membranes are normal. Pharynx is clear.  No cervical adenopathy in the neck.  Rales are present in the bilateral bases of the lungs. The patient coughs with deep breathing.  Heart rate is regular.  Abdomen is soft and nontender. Good bowel sounds are present.  There is tenderness at the right costovertebral angle.  No rashes are present on the skin. No rashes are present in the flank area.    Vital Signs  Oxygen saturation was 89% upon arrival, now at 90%.       Results  Laboratory Studies  Urine test showed no abnormalities.     Assessment / Plan      Assessment/Plan:   Diagnoses and all orders for this visit:    1. Acute cough (Primary)  -     TSH Rfx On Abnormal To Free T4; Future  -     Comprehensive Metabolic Panel; Future  -     CBC & Differential; Future  -     TSH Rfx On Abnormal To Free T4  -     Comprehensive Metabolic Panel  -     CBC & Differential  -     " Respiratory Panel PCR w/COVID-19(SARS-CoV-2) [facility]JAS/AN[facility]BAL/PAD/COR/TONJA In-House, NP Swab in UTM/VTM, 2 HR TAT - Swab, Nasopharynx; Future  -     Respiratory Panel PCR w/COVID-19(SARS-CoV-2) [facility]/AN[facility]BAL/PAD/COR/TONJA In-House, NP Swab in UTM/VTM, 2 HR TAT - Swab, Nasopharynx    2. Flank pain  -     TSH Rfx On Abnormal To Free T4; Future  -     Comprehensive Metabolic Panel; Future  -     CBC & Differential; Future  -     POC Urinalysis Dipstick, Automated; Future  -     Lipase; Future  -     C-reactive protein; Future  -     CK; Future  -     Sedimentation Rate; Future  -     TSH Rfx On Abnormal To Free T4  -     Comprehensive Metabolic Panel  -     CBC & Differential  -     Lipase  -     C-reactive protein  -     CK  -     Sedimentation Rate    3. Hypoxia  -     TSH Rfx On Abnormal To Free T4; Future  -     Comprehensive Metabolic Panel; Future  -     CBC & Differential; Future  -     D-dimer, Quantitative; Future  -     XR Chest PA & Lateral; Future  -     Lactic Acid, Plasma; Future  -     C-reactive protein; Future  -     CK; Future  -     Sedimentation Rate; Future  -     TSH Rfx On Abnormal To Free T4  -     Comprehensive Metabolic Panel  -     CBC & Differential  -     D-dimer, Quantitative  -     Lactic Acid, Plasma  -     C-reactive protein  -     CK  -     Sedimentation Rate  -     Respiratory Panel PCR w/COVID-19(SARS-CoV-2) [facility]/ROJAS/ANÍBAL/PAD/COR/TONJA In-House, NP Swab in UTM/VTM, 2 HR TAT - Swab, Nasopharynx; Future  -     BNP  -     Respiratory Panel PCR w/COVID-19(SARS-CoV-2) [facility]/ROJAS/ANÍBAL/PAD/COR/TONJA In-House, NP Swab in UTM/VTM, 2 HR TAT - Swab, Nasopharynx    4. Dizziness  -     TSH Rfx On Abnormal To Free T4; Future  -     Comprehensive Metabolic Panel; Future  -     CBC & Differential; Future  -     D-dimer, Quantitative; Future  -     POC Urinalysis Dipstick, Automated; Future  -     TSH Rfx On Abnormal To Free T4  -     Comprehensive Metabolic Panel  -     CBC & Differential  -     D-dimer,  Quantitative    5. Type 2 diabetes mellitus with diabetic autonomic neuropathy, with long-term current use of insulin  -     POC Microalbumin; Future    6. Respiratory infection  -     cefdinir (OMNICEF) 300 MG capsule; Take 1 capsule by mouth 2 (Two) Times a Day.  Dispense: 20 capsule; Refill: 0  -     Respiratory Panel PCR w/COVID-19(SARS-CoV-2) [facility]AS/AN[facility]/PAD/COR/TONJA In-House, NP Swab in UTM/VTM, 2 HR TAT - Swab, Nasopharynx; Future  -     Respiratory Panel PCR w/COVID-19(SARS-CoV-2) [facility]JAS/AN[facility]BAL/PAD/COR/TONJA In-House, NP Swab in UTM/VTM, 2 HR TAT - Swab, Nasopharynx  -     predniSONE (DELTASONE) 20 MG tablet; Take 1 tablet by mouth 2 (Two) Times a Day.  Dispense: 10 tablet; Refill: 0    7. Wheezing  -     predniSONE (DELTASONE) 20 MG tablet; Take 1 tablet by mouth 2 (Two) Times a Day.  Dispense: 10 tablet; Refill: 0       Assessment & Plan  1. Right flank pain  - Etiology could be myalgias associated with pneumonia, viral infection, or musculoskeletal in nature  - Conduct urinalysis to rule out blood in urine and urinary tract infection  - Provide strainer for home use to monitor for passed stones    2. Hypoxia/dizziness  - Possible causes: pneumonia, potential blood clot, or other pulmonary abnormality  - Borderline hypoxic state is concerning  - Order blood work including D-dimer test to assess blood clot risk  - Perform chest x-ray  - If D-dimer test results are elevated, advise seeking immediate medical attention at the emergency room  - Prescribed Cefdinir  - Respiratory panel ordered  - Advise continuing probiotics for a minimum of 10 days post-antibiotic therapy  -Labs obtained  - Closely monitor oxygen saturation at home    3. Wheezing  -prednisone 40 mg x 5 days    4. Diabetes  -obtain microalbumin         Explained and discussed patient's condition and plan of care including labs and radiology that may be ordered.  Discussed when to follow-up.  Discussed possible red flags and how to follow-up with  those.  Viewed patient's medications and discussed common side effects and symptoms to report. Patient to continue current medications as advised.  Be compliant with medications. Patient to call clinic if they have any worsening, no improvement, does not tolerate medication, or any future concerns about treatment.  Questions and concerns addressed at this appointment.  Patient to report to ER for emergencies.  Patient verbalized an understanding and agreement with plan of care.      Follow Up:   Discuss follow-up pending labs  Patient or patient representative verbalized consent for the use of Ambient Listening during the visit with  FERNANDA Terrell for chart documentation. 2/19/2025  20:08 EST    FERNANDA Terrell  [facility] [place] [place] [facility]

## 2025-02-19 NOTE — TELEPHONE ENCOUNTER
----- Message from Karen Colmenares sent at 2/19/2025  2:47 PM EST -----  Please let patient know his sodium is low.  It looks like sodium has been chronically low for 2 years however it is a little bit lower than usual.  As sodium decreases the risk for seizures increases.  This may be why you are feeling dizzy.  If symptoms do not improve I would recommend going to the ER for IV fluids and a recheck.  Thyroid levels are slightly increased.  This is common during an acute illness.  We will monitor this over time.  D-dimer was normal which decreases risk of blood clot.  Blood counts are normal except for a slight increase in platelet count.  This is common during an illness.  There are few labs pending.  I will share results as I receive them.  I would recommend rechecking sodium in 2 days to ensure that it is not dropped further.  Please return to clinic in 2 days for follow-up.

## 2025-02-21 ENCOUNTER — APPOINTMENT (OUTPATIENT)
Dept: GENERAL RADIOLOGY | Facility: HOSPITAL | Age: 45
End: 2025-02-21
Payer: COMMERCIAL

## 2025-02-21 ENCOUNTER — HOSPITAL ENCOUNTER (EMERGENCY)
Facility: HOSPITAL | Age: 45
Discharge: HOME | End: 2025-02-21
Attending: EMERGENCY MEDICINE
Payer: COMMERCIAL

## 2025-02-21 ENCOUNTER — OFFICE VISIT (OUTPATIENT)
Dept: INTERNAL MEDICINE | Facility: CLINIC | Age: 45
End: 2025-02-21
Payer: COMMERCIAL

## 2025-02-21 VITALS
WEIGHT: 178.8 LBS | OXYGEN SATURATION: 90 % | SYSTOLIC BLOOD PRESSURE: 108 MMHG | RESPIRATION RATE: 18 BRPM | HEART RATE: 94 BPM | DIASTOLIC BLOOD PRESSURE: 70 MMHG | BODY MASS INDEX: 29.79 KG/M2 | TEMPERATURE: 97.1 F | HEIGHT: 65 IN

## 2025-02-21 VITALS
HEIGHT: 65 IN | TEMPERATURE: 97.7 F | DIASTOLIC BLOOD PRESSURE: 78 MMHG | SYSTOLIC BLOOD PRESSURE: 127 MMHG | WEIGHT: 179 LBS | BODY MASS INDEX: 29.82 KG/M2 | HEART RATE: 99 BPM | OXYGEN SATURATION: 100 % | RESPIRATION RATE: 18 BRPM

## 2025-02-21 DIAGNOSIS — E87.1 HYPONATREMIA: ICD-10-CM

## 2025-02-21 DIAGNOSIS — J10.1 INFLUENZA A: Primary | ICD-10-CM

## 2025-02-21 DIAGNOSIS — R09.02 HYPOXIA: ICD-10-CM

## 2025-02-21 DIAGNOSIS — R42 DIZZINESS: ICD-10-CM

## 2025-02-21 LAB
ALBUMIN SERPL-MCNC: 4.8 G/DL (ref 3.5–5.2)
ALBUMIN/GLOB SERPL: 1.5 G/DL
ALP SERPL-CCNC: 130 U/L (ref 39–117)
ALT SERPL W P-5'-P-CCNC: 34 U/L (ref 1–41)
ANION GAP SERPL CALCULATED.3IONS-SCNC: 13 MMOL/L (ref 5–15)
AST SERPL-CCNC: 25 U/L (ref 1–40)
BASOPHILS # BLD AUTO: 0.09 10*3/MM3 (ref 0–0.2)
BASOPHILS NFR BLD AUTO: 1 % (ref 0–1.5)
BILIRUB SERPL-MCNC: 0.6 MG/DL (ref 0–1.2)
BILIRUB UR QL STRIP: NEGATIVE
BUN SERPL-MCNC: 12 MG/DL (ref 6–20)
BUN/CREAT SERPL: 13.6 (ref 7–25)
CALCIUM SPEC-SCNC: 9.3 MG/DL (ref 8.6–10.5)
CHLORIDE SERPL-SCNC: 96 MMOL/L (ref 98–107)
CLARITY UR: CLEAR
CO2 SERPL-SCNC: 25 MMOL/L (ref 22–29)
COLOR UR: YELLOW
CREAT SERPL-MCNC: 0.88 MG/DL (ref 0.76–1.27)
DEPRECATED RDW RBC AUTO: 46.5 FL (ref 37–54)
EGFRCR SERPLBLD CKD-EPI 2021: 107.4 ML/MIN/1.73
EOSINOPHIL # BLD AUTO: 0.01 10*3/MM3 (ref 0–0.4)
EOSINOPHIL NFR BLD AUTO: 0.1 % (ref 0.3–6.2)
ERYTHROCYTE [DISTWIDTH] IN BLOOD BY AUTOMATED COUNT: 13.5 % (ref 12.3–15.4)
GLOBULIN UR ELPH-MCNC: 3.1 GM/DL
GLUCOSE SERPL-MCNC: 122 MG/DL (ref 65–99)
GLUCOSE UR STRIP-MCNC: NEGATIVE MG/DL
HCT VFR BLD AUTO: 44.6 % (ref 37.5–51)
HGB BLD-MCNC: 14.5 G/DL (ref 13–17.7)
HGB UR QL STRIP.AUTO: NEGATIVE
IMM GRANULOCYTES # BLD AUTO: 0.07 10*3/MM3 (ref 0–0.05)
IMM GRANULOCYTES NFR BLD AUTO: 0.8 % (ref 0–0.5)
KETONES UR QL STRIP: NEGATIVE
LEUKOCYTE ESTERASE UR QL STRIP.AUTO: NEGATIVE
LYMPHOCYTES # BLD AUTO: 1.22 10*3/MM3 (ref 0.7–3.1)
LYMPHOCYTES NFR BLD AUTO: 13.4 % (ref 19.6–45.3)
MAGNESIUM SERPL-MCNC: 2.3 MG/DL (ref 1.6–2.6)
MCH RBC QN AUTO: 30 PG (ref 26.6–33)
MCHC RBC AUTO-ENTMCNC: 32.5 G/DL (ref 31.5–35.7)
MCV RBC AUTO: 92.1 FL (ref 79–97)
MONOCYTES # BLD AUTO: 0.48 10*3/MM3 (ref 0.1–0.9)
MONOCYTES NFR BLD AUTO: 5.3 % (ref 5–12)
NEUTROPHILS NFR BLD AUTO: 7.22 10*3/MM3 (ref 1.7–7)
NEUTROPHILS NFR BLD AUTO: 79.4 % (ref 42.7–76)
NITRITE UR QL STRIP: NEGATIVE
NRBC BLD AUTO-RTO: 0 /100 WBC (ref 0–0.2)
PH UR STRIP.AUTO: 5.5 [PH] (ref 5–8)
PLATELET # BLD AUTO: 527 10*3/MM3 (ref 140–450)
PMV BLD AUTO: 9 FL (ref 6–12)
POTASSIUM SERPL-SCNC: 4.7 MMOL/L (ref 3.5–5.2)
PROT SERPL-MCNC: 7.9 G/DL (ref 6–8.5)
PROT UR QL STRIP: NEGATIVE
RBC # BLD AUTO: 4.84 10*6/MM3 (ref 4.14–5.8)
SODIUM SERPL-SCNC: 134 MMOL/L (ref 136–145)
SP GR UR STRIP: 1.02 (ref 1–1.03)
UROBILINOGEN UR QL STRIP: NORMAL
WBC NRBC COR # BLD AUTO: 9.09 10*3/MM3 (ref 3.4–10.8)

## 2025-02-21 PROCEDURE — 99283 EMERGENCY DEPT VISIT LOW MDM: CPT

## 2025-02-21 PROCEDURE — 83735 ASSAY OF MAGNESIUM: CPT | Performed by: EMERGENCY MEDICINE

## 2025-02-21 PROCEDURE — 85025 COMPLETE CBC W/AUTO DIFF WBC: CPT | Performed by: EMERGENCY MEDICINE

## 2025-02-21 PROCEDURE — 99214 OFFICE O/P EST MOD 30 MIN: CPT | Performed by: NURSE PRACTITIONER

## 2025-02-21 PROCEDURE — 36415 COLL VENOUS BLD VENIPUNCTURE: CPT

## 2025-02-21 PROCEDURE — 80053 COMPREHEN METABOLIC PANEL: CPT | Performed by: EMERGENCY MEDICINE

## 2025-02-21 PROCEDURE — 81003 URINALYSIS AUTO W/O SCOPE: CPT | Performed by: EMERGENCY MEDICINE

## 2025-02-21 PROCEDURE — 71045 X-RAY EXAM CHEST 1 VIEW: CPT

## 2025-02-21 RX ORDER — DOXYCYCLINE 100 MG/1
100 CAPSULE ORAL 2 TIMES DAILY
Status: CANCELLED | OUTPATIENT
Start: 2025-02-21

## 2025-02-21 NOTE — PROGRESS NOTES
Patient Name: Eric Ramirez  : 1980   MRN: [id number]     Chief Complaint:    Chief Complaint   Patient presents with    Cough     Follow up       History of Present Illness: Eric Ramirez is a 44 y.o. male.  History of Present Illness  The patient is a 44-year-old male who presents for evaluation of influenza and hyponatremia. He is accompanied by his wife.    Influenza  - Reports slight improvement in his condition since the last encounter but had  fever last night, with a temperature of approximately 100.6 degrees Fahrenheit around 4:00 PM  - Oxygen saturation was recorded as 96% this morning, although he has not been monitoring it at home  - Continues to experience shortness of breath, but it is less severe than before  - Reports an increase in coughing but is uncertain about the presence of sputum production  - Does not endorse any chest pain  - Initiated treatment with cefdinir and prednisone 2 days ago  -dizziness and fatigue  -No flank pain    Hyponatremia  - Sodium levels have been consistently low, with a recent decrease prompting a call from the clinic due to concerns about potential seizures  - Reports a slight improvement in fatigue and weakness but still experiences significant tiredness after minimal exertion  - Wife notes that he tends to rest in bed after any activity  - Admits to consuming several beers daily and has continued this habit over the past week  -Endorses slight confusion    Supplemental Information: None    SOCIAL HISTORY  The patient drinks several beers a day.    ALLERGIES  The patient can not take LEVAQUIN.    MEDICATIONS  Current: Cefdinir, prednisone     Subjective     Review of System: Review of Systems     Medications:     Current Outpatient Medications:     buPROPion XL (WELLBUTRIN XL) 300 MG 24 hr tablet, Take 1 tablet by mouth Daily., Disp: 90 tablet, Rfl: 1    cefdinir (OMNICEF) 300 MG capsule, Take 1 capsule by mouth 2 (Two) Times a Day., Disp: 20  capsule, Rfl: 0    Cetirizine HCl (ZYRTEC ALLERGY PO), Take 1 tablet by mouth daily., Disp: , Rfl:     Continuous Glucose Sensor (Dexcom G7 Sensor) misc, Use 1 each Every 10 (Ten) Days., Disp: 3 each, Rfl: 5    FOLIC ACID PO, Take 1 tablet by mouth Daily., Disp: , Rfl:     hydroCHLOROthiazide 25 MG tablet, Take 1 tablet by mouth Daily., Disp: 90 tablet, Rfl: 3    Insulin Glargine (Lantus SoloStar) 100 UNIT/ML injection pen, 12 units daily, Disp: 15 mL, Rfl: 5    Insulin Lispro, 1 Unit Dial, (HUMALOG) 100 UNIT/ML solution pen-injector, INJECT UNDER THE SKIN AT MEAL TIMES AND PER CORRECTIONAL SCALE. MAX DAILY DOSE OF 30 UNITS., Disp: 15 mL, Rfl: 5    Insulin Pen Needle 31G X 5 MM misc, 1 each Daily. Use to inject insulin, Disp: 100 each, Rfl: 11    lisinopril (PRINIVIL,ZESTRIL) 10 MG tablet, TAKE 1 TABLET BY MOUTH DAILY, Disp: 90 tablet, Rfl: 3    omeprazole (priLOSEC) 20 MG capsule, TAKE ONE CAPSULE BY MOUTH DAILY, Disp: 90 capsule, Rfl: 3    predniSONE (DELTASONE) 20 MG tablet, Take 1 tablet by mouth 2 (Two) Times a Day., Disp: 10 tablet, Rfl: 0    Probiotic Product (PROBIOTIC DAILY PO), Take 1 tablet by mouth Daily., Disp: , Rfl:     rosuvastatin (CRESTOR) 10 MG tablet, Take 1 tablet by mouth Daily., Disp: 90 tablet, Rfl: 3    sulfaSALAzine (AZULFIDINE) 500 MG tablet, Take 4 tablets by mouth 2 (Two) Times a Day., Disp: 240 tablet, Rfl: 11    Tirzepatide (Mounjaro) 5 MG/0.5ML solution auto-injector, Inject 5 mg under the skin into the appropriate area as directed 1 (One) Time Per Week., Disp: 2 mL, Rfl: 5    Allergies:   Allergies   Allergen Reactions    Levofloxacin Itching     Throat itching, eyes watering    Buspirone Itching    Flagyl [Metronidazole] Hives and Swelling       Objective     Physical Exam:   Vital Signs:   Vitals:    02/21/25 1058 02/21/25 1245   BP: 108/70    BP Location: Right arm    Patient Position: Sitting    Cuff Size: Adult    Pulse: 94    Resp: 18    Temp: 97.1 °F (36.2 °C)    TempSrc:  "Infrared    SpO2: 96% 90%   Weight: 81.1 kg (178 lb 12.8 oz)    Height: 165.1 cm (65\")    PainSc: 0-No pain            Physical Exam  Constitutional:       General: He is not in acute distress.     Appearance: He is not ill-appearing.   HENT:      Head: Normocephalic.      Right Ear: Tympanic membrane normal.      Left Ear: Tympanic membrane normal.   Cardiovascular:      Rate and Rhythm: Normal rate and regular rhythm.      Heart sounds: Normal heart sounds. No murmur heard.  Pulmonary:      Breath sounds: Normal breath sounds.   Abdominal:      General: Bowel sounds are normal.      Tenderness: There is no abdominal tenderness. There is no right CVA tenderness or left CVA tenderness.   Lymphadenopathy:      Cervical: No cervical adenopathy.   Neurological:      General: No focal deficit present.      Mental Status: He is oriented to person, place, and time.   Psychiatric:         Mood and Affect: Mood normal.   Physical Exam  Vital Signs  Oxygen saturation was 96% this morning.       Results  Laboratory Studies  Sodium level was 127.  Respiratory panel was positive for influenza.    Imaging  Chest x-ray was not revealing of anything.     Assessment / Plan      Assessment/Plan:   Diagnoses and all orders for this visit:    1. Influenza A (Primary)    2. Hypoxia    3. Dizziness    4. Hyponatremia       Assessment & Plan  1. Influenza, hypoxia, dizziness   - Symptoms: fever, shortness of breath, increased coughing  - Respiratory panel positive for influenza A  - Chest x-ray: no significant findings  - Taking cefdinir and prednisone  - oxygen saturation decrease to 90 % with 2 minute walk with associated symptoms of dizziness   -  Discussed if no improvement  CT scan of the chest to further evaluate  -  Consider adding doxycycline    2. Hyponatremia  - Sodium level  low at 127, increasing risk of seizures (acute on chronic likely due to alcohol use)   - Re-evaluation of sodium levels today and hyponatremia work up  - " Advise to abstain from alcohol and limit fluid intake, particularly beer  - If sodium levels do not improve or worsening symptoms, go to ER for potential fluid replacement therapy  - Seek immediate medical attention at ER if experiencing severe fatigue, weakness, or other concerning symptoms   -Endorses mild confusion    After further discussion of patient's symptoms i.e. low sodium, hypoxia, confusion and dizziness, proceed with lab work and imaging however results and scheduling may take hours to days.  ER evaluation recommended for expedited evaluation.  Patient agree to be evaluated in ER.  Report called to Zoraida at [facility] ER.  Patient will be taken by car with wife.  He left clinic in a stable condition.    Questions and concerns addressed at this appointment.    Patient verbalized an understanding and agreement with plan of care.    Follow-up:   Per ER instructions  Patient or patient representative verbalized consent for the use of Ambient Listening during the visit with  FERNANDA Terrell for chart documentation. 2/21/2025  12:57 EST    FERNANDA Terrell  [facility]

## 2025-02-22 NOTE — ED PROVIDER NOTES
"Subjective   History of Present Illness  44-year-old male presents for evaluation of \"abnormal labs.\"  The patient was sent here by his primary care physician.  Of note, the patient is a  here in the area.  He notes that he has been around multiple sick contacts as of late.  About 2 weeks ago he began to feel ill with flulike symptoms including cough, myalgias, and fatigue.  He saw his primary care physician regarding his symptoms 2 days ago at which time he tested positive for influenza A.  Labs were also drawn and he was noted to be hyponatremic with a sodium of 127.  As a result, the patient was advised to come to the emergency department for evaluation today.  Currently, the patient notes that he seems to be improving.  He is tolerating p.o. without difficulty.  He denies any current fever.  He endorses a mild residual cough.  He denies any known exposures to any with COVID-19.      Review of Systems   Constitutional:  Positive for fatigue.   Respiratory:  Positive for cough.    Musculoskeletal:  Positive for myalgias.   All other systems reviewed and are negative.      Past Medical History:   Diagnosis Date    Acute pharyngitis     Arrhythmia     Arthritis     Bronchitis     Chest wall injury     Diabetes mellitus     Gallbladder disease     Gastritis     Generalized anxiety disorder     GERD (gastroesophageal reflux disease)     Hypertension     Internal hemorrhoids     Labyrinthitis     Palpitations     Pre-diabetes     Rectal hemorrhage     Sinusitis     Type 2 diabetes mellitus     Ulcerative colitis     URI (upper respiratory infection)        Allergies   Allergen Reactions    Levofloxacin Itching     Throat itching, eyes watering    Buspirone Itching    Flagyl [Metronidazole] Hives and Swelling       Past Surgical History:   Procedure Laterality Date    CHOLECYSTECTOMY      COLONOSCOPY  07/28/16Maxine Nelson       Family History   Problem Relation Age of Onset    Hypertension Mother  "    Melanoma Father     Heart disease Father     Scleroderma Sister     Pulmonary fibrosis Sister     Pyloric stenosis Sister     Colon polyps Neg Hx     Colon cancer Neg Hx        Social History     Socioeconomic History    Marital status:     Number of children: 3   Tobacco Use    Smoking status: Former     Current packs/day: 0.00     Average packs/day: 1 pack/day for 15.0 years (15.0 ttl pk-yrs)     Types: Cigarettes     Start date: 1997     Quit date: 2012     Years since quittin.1     Passive exposure: Past    Smokeless tobacco: Never   Vaping Use    Vaping status: Never Used   Substance and Sexual Activity    Alcohol use: Yes     Alcohol/week: 2.0 - 3.0 standard drinks of alcohol     Types: 2 - 3 Cans of beer per week     Comment: social      Drug use: No    Sexual activity: Yes     Partners: Female           Objective   Physical Exam  Vitals and nursing note reviewed.   Constitutional:       General: He is not in acute distress.     Appearance: He is well-developed. He is not diaphoretic.      Comments: Nontoxic-appearing male   HENT:      Head: Normocephalic and atraumatic.      Comments: Posterior oropharynx is clear and without erythema or exudate, uvula is midline, normal voice, no mucous membrane lesions noted  Neck:      Vascular: No JVD.      Comments: No meningeal signs or nuchal rigidity  Cardiovascular:      Rate and Rhythm: Normal rate and regular rhythm.      Heart sounds: Normal heart sounds. No murmur heard.     No friction rub. No gallop.   Pulmonary:      Effort: Pulmonary effort is normal. No respiratory distress.      Breath sounds: Normal breath sounds. No wheezing or rales.   Abdominal:      General: Bowel sounds are normal. There is no distension.      Palpations: Abdomen is soft. There is no mass.      Tenderness: There is no abdominal tenderness. There is no guarding.   Musculoskeletal:         General: Normal range of motion.      Cervical back: Normal range of  "motion.   Skin:     General: Skin is warm and dry.      Coloration: Skin is not pale.      Findings: No erythema or rash.   Neurological:      Mental Status: He is alert and oriented to person, place, and time.   Psychiatric:         Thought Content: Thought content normal.         Judgment: Judgment normal.         Procedures           ED Course  ED Course as of 02/21/25 2148 Fri Feb 21, 2025   1213 44-year-old male presents for evaluation of of \"abnormal labs.\"  He was sent here by his primary care physician.  Of note, he is a  here in the area.  He notes recent sick contacts.  About 2 weeks ago he began to not feel well with flulike symptoms including cough, myalgias, and fatigue.  He saw his primary care physician regarding his symptoms 2 days ago at which time he tested positive for influenza A.  Labs were also drawn and he was noted to be hyponatremic with a sodium of 127.  As a result, he was advised to come here to the ED to be evaluated today.  On arrival, the patient is nontoxic-appearing.  Benign exam.  Vital signs reassuring.  Lungs are clear. [DD]   1214 Differential diagnosis is quite broad.  We will obtain labs and imaging, and we will reassess following initial interventions. [DD]   1214 I reviewed the patient's outside labs from 2 days ago including his sodium level of 127 and his influenza a test that was positive.  D-dimer was negative at that time as well. [DD]   1215 Low risk Well's and PERC negative. [DD]   1253 Labs remarkable for mild hyponatremia at 134.  Platelets are mildly elevated as well.  Labs are otherwise bland/unrevealing. [DD]   1322 I personally and independently viewed the patient's x-ray images myself, and I am in agreement with the radiologist's reading for final interpretation, particularly there is no pneumonia noted. [DD]   1322 Patient reassured and counseled regarding symptomatic management of his influenza.  He will follow-up with his primary care " physician within the next week.  Agreeable with plan and given appropriate strict return precautions. [DD]      ED Course User Index  [DD] Antione Zaldivar MD                                             Recent Results (from the past 24 hours)   Urinalysis With Culture If Indicated - Urine, Clean Catch    Collection Time: 02/21/25 12:14 PM    Specimen: Urine, Clean Catch   Result Value Ref Range    Color, UA Yellow Yellow, Straw    Appearance, UA Clear Clear    pH, UA 5.5 5.0 - 8.0    Specific Gravity, UA 1.018 1.001 - 1.030    Glucose, UA Negative Negative    Ketones, UA Negative Negative    Bilirubin, UA Negative Negative    Blood, UA Negative Negative    Protein, UA Negative Negative    Leuk Esterase, UA Negative Negative    Nitrite, UA Negative Negative    Urobilinogen, UA 0.2 E.U./dL 0.2 - 1.0 E.U./dL   Comprehensive Metabolic Panel    Collection Time: 02/21/25 12:15 PM    Specimen: Arm, Left; Blood   Result Value Ref Range    Glucose 122 (H) 65 - 99 mg/dL    BUN 12 6 - 20 mg/dL    Creatinine 0.88 0.76 - 1.27 mg/dL    Sodium 134 (L) 136 - 145 mmol/L    Potassium 4.7 3.5 - 5.2 mmol/L    Chloride 96 (L) 98 - 107 mmol/L    CO2 25.0 22.0 - 29.0 mmol/L    Calcium 9.3 8.6 - 10.5 mg/dL    Total Protein 7.9 6.0 - 8.5 g/dL    Albumin 4.8 3.5 - 5.2 g/dL    ALT (SGPT) 34 1 - 41 U/L    AST (SGOT) 25 1 - 40 U/L    Alkaline Phosphatase 130 (H) 39 - 117 U/L    Total Bilirubin 0.6 0.0 - 1.2 mg/dL    Globulin 3.1 gm/dL    A/G Ratio 1.5 g/dL    BUN/Creatinine Ratio 13.6 7.0 - 25.0    Anion Gap 13.0 5.0 - 15.0 mmol/L    eGFR 107.4 >60.0 mL/min/1.73   Magnesium    Collection Time: 02/21/25 12:15 PM    Specimen: Arm, Left; Blood   Result Value Ref Range    Magnesium 2.3 1.6 - 2.6 mg/dL   CBC Auto Differential    Collection Time: 02/21/25 12:15 PM    Specimen: Arm, Left; Blood   Result Value Ref Range    WBC 9.09 3.40 - 10.80 10*3/mm3    RBC 4.84 4.14 - 5.80 10*6/mm3    Hemoglobin 14.5 13.0 - 17.7 g/dL    Hematocrit 44.6 37.5 -  "51.0 %    MCV 92.1 79.0 - 97.0 fL    MCH 30.0 26.6 - 33.0 pg    MCHC 32.5 31.5 - 35.7 g/dL    RDW 13.5 12.3 - 15.4 %    RDW-SD 46.5 37.0 - 54.0 fl    MPV 9.0 6.0 - 12.0 fL    Platelets 527 (H) 140 - 450 10*3/mm3    Neutrophil % 79.4 (H) 42.7 - 76.0 %    Lymphocyte % 13.4 (L) 19.6 - 45.3 %    Monocyte % 5.3 5.0 - 12.0 %    Eosinophil % 0.1 (L) 0.3 - 6.2 %    Basophil % 1.0 0.0 - 1.5 %    Immature Grans % 0.8 (H) 0.0 - 0.5 %    Neutrophils, Absolute 7.22 (H) 1.70 - 7.00 10*3/mm3    Lymphocytes, Absolute 1.22 0.70 - 3.10 10*3/mm3    Monocytes, Absolute 0.48 0.10 - 0.90 10*3/mm3    Eosinophils, Absolute 0.01 0.00 - 0.40 10*3/mm3    Basophils, Absolute 0.09 0.00 - 0.20 10*3/mm3    Immature Grans, Absolute 0.07 (H) 0.00 - 0.05 10*3/mm3    nRBC 0.0 0.0 - 0.2 /100 WBC     Note: In addition to lab results from this visit, the labs listed above may include labs taken at another facility or during a different encounter within the last 24 hours. Please correlate lab times with ED admission and discharge times for further clarification of the services performed during this visit.    XR Chest 1 View   Final Result   Impression:   No acute cardiopulmonary process.         Electronically Signed: Antione Wilhelm MD     2/21/2025 1:02 PM EST     Workstation ID: [id number]        Vitals:    02/21/25 1204 02/21/25 1332 02/21/25 1333   BP: 139/95 127/78    BP Location: Right arm     Patient Position: Sitting     Pulse: 88 99    Resp: 16 18    Temp: 97.7 °F (36.5 °C)     TempSrc: Oral     SpO2: 97%  100%   Weight: 81.2 kg (179 lb)     Height: 165.1 cm (65\")       Medications - No data to display  ECG/EMG Results (last 24 hours)       ** No results found for the last 24 hours. **          No orders to display                 Medical Decision Making  Problems Addressed:  Hyponatremia: complicated acute illness or injury  Influenza A: complicated acute illness or injury    Amount and/or Complexity of Data Reviewed  Labs: ordered.  Radiology: " ordered.        Final diagnoses:   Influenza A   Hyponatremia       ED Disposition  ED Disposition       ED Disposition   Discharge    Condition   Stable    Comment   --               Karen Colmenares, APRN  [address] [address]  [phone]    In 1 week           Medication List      No changes were made to your prescriptions during this visit.            Antione Zaldivar MD  02/21/25 3902

## 2025-03-12 RX ORDER — ACYCLOVIR 400 MG/1
1 TABLET ORAL
Qty: 3 EACH | Refills: 5 | Status: SHIPPED | OUTPATIENT
Start: 2025-03-12

## 2025-03-12 NOTE — TELEPHONE ENCOUNTER
Rx Refill Note  Requested Prescriptions     Pending Prescriptions Disp Refills    Continuous Glucose Sensor (Dexcom G7 Sensor) misc 3 each 5     Sig: Use 1 each Every 10 (Ten) Days.      Last office visit with prescribing clinician: 12/20/2024     Next office visit with prescribing clinician: 4/29/2025     Mili Yeh MA  03/12/25, 12:05 EDT

## 2025-03-28 RX ORDER — ACYCLOVIR 400 MG/1
1 TABLET ORAL
Qty: 4 EACH | Refills: 5 | Status: SHIPPED | OUTPATIENT
Start: 2025-03-28

## 2025-03-28 NOTE — TELEPHONE ENCOUNTER
Rx Refill Note  Requested Prescriptions     Pending Prescriptions Disp Refills    Continuous Glucose Sensor (Dexcom G7 Sensor) misc 3 each 5     Sig: Use 1 each Every 10 (Ten) Days.      Last office visit with prescribing clinician: 12/20/2024     Next office visit with prescribing clinician: 4/29/2025     Mili Yeh MA  03/28/25, 08:20 EDT

## 2025-04-01 RX ORDER — ACYCLOVIR 400 MG/1
1 TABLET ORAL
Qty: 1 EACH | Refills: 0 | Status: SHIPPED | OUTPATIENT
Start: 2025-04-01

## 2025-04-29 ENCOUNTER — OFFICE VISIT (OUTPATIENT)
Dept: ENDOCRINOLOGY | Facility: CLINIC | Age: 45
End: 2025-04-29
Payer: COMMERCIAL

## 2025-04-29 VITALS
HEIGHT: 65 IN | BODY MASS INDEX: 33.15 KG/M2 | DIASTOLIC BLOOD PRESSURE: 82 MMHG | OXYGEN SATURATION: 96 % | SYSTOLIC BLOOD PRESSURE: 130 MMHG | WEIGHT: 199 LBS | HEART RATE: 97 BPM

## 2025-04-29 DIAGNOSIS — R94.6 ABNORMAL THYROID FUNCTION TEST: ICD-10-CM

## 2025-04-29 DIAGNOSIS — E78.5 HYPERLIPIDEMIA, UNSPECIFIED HYPERLIPIDEMIA TYPE: ICD-10-CM

## 2025-04-29 DIAGNOSIS — E11.65 TYPE 2 DIABETES MELLITUS WITH HYPERGLYCEMIA, WITH LONG-TERM CURRENT USE OF INSULIN: Primary | ICD-10-CM

## 2025-04-29 DIAGNOSIS — Z79.4 TYPE 2 DIABETES MELLITUS WITH HYPERGLYCEMIA, WITH LONG-TERM CURRENT USE OF INSULIN: Primary | ICD-10-CM

## 2025-04-29 LAB
EXPIRATION DATE: ABNORMAL
EXPIRATION DATE: NORMAL
GLUCOSE BLDC GLUCOMTR-MCNC: 87 MG/DL (ref 70–130)
HBA1C MFR BLD: 4.4 % (ref 4.5–5.7)
Lab: ABNORMAL
Lab: NORMAL

## 2025-04-29 RX ORDER — TIRZEPATIDE 2.5 MG/.5ML
2.5 INJECTION, SOLUTION SUBCUTANEOUS WEEKLY
Qty: 6 ML | Refills: 1 | Status: SHIPPED | OUTPATIENT
Start: 2025-04-29

## 2025-04-29 RX ORDER — INSULIN GLARGINE 100 [IU]/ML
INJECTION, SOLUTION SUBCUTANEOUS
Start: 2025-04-29

## 2025-04-29 NOTE — PROGRESS NOTES
"Chief Complaint   Patient presents with    Diabetes        HPI   Eric Ramirez is a 44 y.o. male had concerns including Diabetes.     Patient reports some interval issues with his Dexcom sensor.  However, he does want to continue utilizing CGM.  He also reports taste changes which he believes have occurred since increasing dose of Mounjaro.    Current diabetes medications include the following:  Mounjaro 5 mg weekly  Lantus 12 units daily  Humalog 1 unit per 50 greater than 150     Patient previously did not tolerate metformin.    Patient is taking rosuvastatin for hyperlipidemia.    The following portions of the patient's history were reviewed and updated as appropriate: allergies, current medications, and past social history.    Review of Systems   Constitutional:  Positive for unexpected weight loss.   Gastrointestinal:  Negative for nausea and vomiting.        /82   Pulse 97   Ht 165.1 cm (65\")   Wt 90.3 kg (199 lb)   SpO2 96%   BMI 33.12 kg/m²      Physical Exam  Cardiovascular:      Pulses:           Dorsalis pedis pulses are 2+ on the right side and 2+ on the left side.   Feet:      Right foot:      Protective Sensation: 5 sites tested.  5 sites sensed.      Skin integrity: Skin integrity normal.      Left foot:      Protective Sensation: 5 sites tested.  5 sites sensed.      Skin integrity: Skin integrity normal.           Constitutional:  well developed; well nourished  no acute distress  appears stated age   ENT/Thyroid: not examined   Eyes: Conjunctiva: clear   Respiratory:  breathing is unlabored  clear to auscultation bilaterally   Cardiovascular:  regular rate and rhythm   Chest:  Not performed.   Abdomen: Not performed.   : Not performed.   Musculoskeletal: Not performed   Skin: dry and warm   Neuro: mental status, speech normal   Psych: mood and affect are within normal limits     Diabetic Foot Exam Performed and Monofilament Test Performed     Labs/Imaging  A1C:  Lab Results "   Component Value Date    HGBA1C 4.4 (A) 04/29/2025    HGBA1C 5.1 12/20/2024      Glucose:  Lab Results   Component Value Date    POCGLU 87 04/29/2025      CMP:  Lab Results   Component Value Date    GLUCOSE 122 (H) 02/21/2025    BUN 12 02/21/2025    CREATININE 0.88 02/21/2025     (L) 02/21/2025    K 4.7 02/21/2025    CL 96 (L) 02/21/2025    CALCIUM 9.3 02/21/2025    PROTEINTOT 7.9 02/21/2025    ALBUMIN 4.8 02/21/2025    ALT 34 02/21/2025    AST 25 02/21/2025    ALKPHOS 130 (H) 02/21/2025    BILITOT 0.6 02/21/2025    GLOB 3.1 02/21/2025    AGRATIO 1.5 02/21/2025    BCR 13.6 02/21/2025    ANIONGAP 13.0 02/21/2025    EGFR 107.4 02/21/2025      Lipid Panel:  Lab Results   Component Value Date    CHOL 147 09/05/2024    CHLPL 209 (H) 08/26/2015    TRIG 66 09/05/2024    HDL 57 09/05/2024    LDL 77 09/05/2024      Urine Microalbumin:  Lab Results   Component Value Date    MICROALBUR <1.2 09/05/2024      TSH:  Lab Results   Component Value Date    TSH 4.220 (H) 02/19/2025       Dexcom downloaded for review for dates ranging April 16 through April 29, 2025, average glucose 116 with GMI 6.1%.  98% of data is within target range, 2% high.  96% of days have CGM data.  Patient with mild postprandial changes.  There is no pattern of hypoglycemia.    Diagnoses and all orders for this visit:    1. Type 2 diabetes mellitus with hyperglycemia, with long-term current use of insulin (Primary)  -     POC Glucose, Blood  -     POC Glycosylated Hemoglobin (Hb A1C)  Diabetes is well-controlled with hemoglobin A1c 4.4%.  CGM containing 72 hours of glucose data was downloaded and reviewed.  Patient has hyperglycemia and hypoglycemia per CGM.  Low alerts may be false alarms due to sensor compression.  Patient to reduce Lantus to 10 units daily  Discussed trial of reducing Mounjaro back to previous dose to see if this has any impact on the altered taste.  Patient agreeable.  Prescription was sent to pharmacy.  CMP reviewed from February  2025  Lipid panel and urine microalbumin are up-to-date from September 2024-patient is taking rosuvastatin      2. Abnormal thyroid function test  TSH of 4.22 noted on chart review.  Plan to repeat thyroid function testing with next labs.    Other orders  -     Tirzepatide (Mounjaro) 2.5 MG/0.5ML solution auto-injector; Inject 2.5 mg under the skin into the appropriate area as directed 1 (One) Time Per Week.  Dispense: 6 mL; Refill: 1      Return in about 4 months (around 8/29/2025). The patient was instructed to contact the clinic with any interval questions or concerns.    Electronically signed by: Jil Ledesma MD   Endocrinologist    Dictated Utilizing Dragon Dictation

## 2025-05-07 DIAGNOSIS — K51.019 ULCERATIVE PANCOLITIS WITH COMPLICATION: ICD-10-CM

## 2025-05-07 NOTE — TELEPHONE ENCOUNTER
"     Caller: Eric Ramirez \"Emily\"    Relationship: Self    Best call back number:  [phone]    Requested Prescriptions:   Requested Prescriptions     Pending Prescriptions Disp Refills    sulfaSALAzine (AZULFIDINE) 500 MG tablet 240 tablet 11     Sig: Take 4 tablets by mouth 2 (Two) Times a Day.        Pharmacy where request should be sent:    [facility] [id number] [address] [phone] [place] [id number] [address] [address]   Phone: [phone] Fax: [phone]   Hours: Not open 24 hours       Last office visit with prescribing clinician: Visit date not found   Last telemedicine visit with prescribing clinician: Visit date not found   Next office visit with prescribing clinician: 6/11/2025     Additional details provided by patient: PT HAS BEEN TRYING TO GET THIS MEDICATION REFILLED, PT WOULD LIKE A CALL BACK ONCE SENT TO PHARMACY.    Does the patient have less than a 3 day supply:  [x] Yes  [] No    Would you like a call back once the refill request has been completed: [x] Yes [] No    If the office needs to give you a call back, can they leave a voicemail: [x] Yes [] No    Darron Corrigan Rep   05/07/25 11:25 EDT            "

## 2025-05-08 RX ORDER — SULFASALAZINE 500 MG/1
2000 TABLET ORAL 2 TIMES DAILY
Qty: 240 TABLET | Refills: 11 | Status: SHIPPED | OUTPATIENT
Start: 2025-05-08

## 2025-05-19 RX ORDER — ACYCLOVIR 400 MG/1
1 TABLET ORAL
Qty: 3 EACH | Refills: 11 | Status: SHIPPED | OUTPATIENT
Start: 2025-05-19

## 2025-06-11 ENCOUNTER — OFFICE VISIT (OUTPATIENT)
Dept: GASTROENTEROLOGY | Facility: CLINIC | Age: 45
End: 2025-06-11
Payer: COMMERCIAL

## 2025-06-11 VITALS
DIASTOLIC BLOOD PRESSURE: 82 MMHG | HEART RATE: 105 BPM | HEIGHT: 69 IN | WEIGHT: 184.4 LBS | BODY MASS INDEX: 27.31 KG/M2 | OXYGEN SATURATION: 95 % | SYSTOLIC BLOOD PRESSURE: 105 MMHG

## 2025-06-11 DIAGNOSIS — K51.00 ULCERATIVE PANCOLITIS WITHOUT COMPLICATION: ICD-10-CM

## 2025-06-11 DIAGNOSIS — K51.019 ULCERATIVE PANCOLITIS WITH COMPLICATION: ICD-10-CM

## 2025-06-11 DIAGNOSIS — Z91.89 HIGH RISK FOR COLON CANCER: ICD-10-CM

## 2025-06-11 DIAGNOSIS — D75.839 THROMBOCYTOSIS: Primary | ICD-10-CM

## 2025-06-11 PROCEDURE — 99214 OFFICE O/P EST MOD 30 MIN: CPT | Performed by: INTERNAL MEDICINE

## 2025-06-15 NOTE — PROGRESS NOTES
GASTROENTEROLOGY OFFICE NOTE  Eric Ramirez  [id number]  1980      Chief Complaint   Patient presents with    Ulcerative Pancolitis with complications     Yearly and Med refill        HISTORY OF PRESENT ILLNESS:  History of Present Illness  The patient is a 44-year-old white male with ulcerative pancolitis, here today for his routine yearly follow-up. He is currently being managed with sulfasalazine and is requesting a refill of this medication. His last colonoscopy was in 04/2024, which showed no active ulcerative colitis or evidence of dysplasia on random biopsies. He continues on his current mesalamine regimen rather than sulfasalazine and is doing well without any specific localizing GI complaints.    He reports overall good health with no recent flare-ups of his condition. He does not experience fevers, chills, diarrhea, blood in stool, abdominal cramping, or difficulty swallowing. He also reports no nausea or vomiting. He is currently on sulfasalazine 2000 mg twice daily and takes a folic acid supplement every morning.    He experiences intermittent joint aches, typically lasting between 8 to 16 hours, but these are not accompanied by any gastrointestinal symptoms.    He has been experiencing weight loss, which he attributes to a reduced appetite. He has lost approximately 30 pounds and has since regained about 6.5 to 7 pounds. He has been supplementing his diet with protein shakes.    He is on Mounjaro for diabetes management. His A1c was 4.4.    He is on omeprazole 20 mg daily for acid reflux, which is completely controlled. He does not have any difficulty swallowing, nausea, vomiting, or blood in the stool. His appetite and weight are stable.    He had some viral illness with respiratory symptoms and body aches. He was sent to the emergency room and had a CBC done at that time. He is not sure if he had a CBC done since then.        PAST MEDICAL HISTORY  Past Medical History:    Acute  pharyngitis    Arrhythmia    Arthritis    Bronchitis    Chest wall injury    Diabetes mellitus    Gallbladder disease    Gastritis    Generalized anxiety disorder    GERD (gastroesophageal reflux disease)    Hypertension    Internal hemorrhoids    Labyrinthitis    Palpitations    Pre-diabetes    Rectal hemorrhage    Sinusitis    Type 2 diabetes mellitus    Ulcerative colitis    URI (upper respiratory infection)        PAST SURGICAL HISTORY  Past Surgical History:    CHOLECYSTECTOMY    COLONOSCOPY        MEDICATIONS:    Current Outpatient Medications:     buPROPion XL (WELLBUTRIN XL) 300 MG 24 hr tablet, Take 1 tablet by mouth Daily., Disp: 90 tablet, Rfl: 1    Cetirizine HCl (ZYRTEC ALLERGY PO), Take 1 tablet by mouth daily., Disp: , Rfl:     Continuous Glucose Sensor (Dexcom G7 Sensor) misc, Use 1 each Every 10 (Ten) Days., Disp: 3 each, Rfl: 11    FOLIC ACID PO, Take 1 tablet by mouth Daily., Disp: , Rfl:     hydroCHLOROthiazide 25 MG tablet, Take 1 tablet by mouth Daily., Disp: 90 tablet, Rfl: 3    Insulin Glargine (Lantus SoloStar) 100 UNIT/ML injection pen, 10 units daily, Disp: , Rfl:     Insulin Lispro, 1 Unit Dial, (HUMALOG) 100 UNIT/ML solution pen-injector, INJECT UNDER THE SKIN AT MEAL TIMES AND PER CORRECTIONAL SCALE. MAX DAILY DOSE OF 30 UNITS., Disp: 15 mL, Rfl: 5    Insulin Pen Needle 31G X 5 MM misc, 1 each Daily. Use to inject insulin, Disp: 100 each, Rfl: 11    lisinopril (PRINIVIL,ZESTRIL) 10 MG tablet, TAKE 1 TABLET BY MOUTH DAILY, Disp: 90 tablet, Rfl: 3    omeprazole (priLOSEC) 20 MG capsule, TAKE ONE CAPSULE BY MOUTH DAILY, Disp: 90 capsule, Rfl: 3    Probiotic Product (PROBIOTIC DAILY PO), Take 1 tablet by mouth Daily., Disp: , Rfl:     rosuvastatin (CRESTOR) 10 MG tablet, Take 1 tablet by mouth Daily., Disp: 90 tablet, Rfl: 3    sulfaSALAzine (AZULFIDINE) 500 MG tablet, Take 4 tablets by mouth 2 (Two) Times a Day., Disp: 240 tablet, Rfl: 11    Tirzepatide (Mounjaro) 2.5 MG/0.5ML solution  "auto-injector, Inject 2.5 mg under the skin into the appropriate area as directed 1 (One) Time Per Week., Disp: 6 mL, Rfl: 1    ALLERGIES  is allergic to levofloxacin, buspirone, and flagyl [metronidazole].    FAMILY HISTORY:  Cancer-related family history includes Melanoma in his father. There is no history of Colon cancer.  Colon Cancer-related family history is negative for Colon polyps and Colon cancer.    SOCIAL HISTORY  He  reports that he quit smoking about 13 years ago. His smoking use included cigarettes. He started smoking about 28 years ago. He has a 15 pack-year smoking history. He has been exposed to tobacco smoke. He has never used smokeless tobacco. He reports current alcohol use of about 2.0 - 3.0 standard drinks of alcohol per week. He reports that he does not use drugs.   Marital Status: .  3 children.  Occupation:  in [employer]  Alcohol: Drinks alcohol socially  Tobacco: Does not smoke    PHYSICAL EXAM   /82 (BP Location: Left arm, Patient Position: Sitting)   Pulse 105   Ht 175.3 cm (69\")   Wt 83.6 kg (184 lb 6.4 oz)   SpO2 95%   BMI 27.23 kg/m²   General: Pleasant, no apparent acute distress.  Alert and oriented.  HEENT: Anicteric sclera  Lungs: Grossly normal respiration without labored breathing or audible wheezing noted.  Speaking in full sentences  Abdomen: Without gross or obvious distention  Neurologic: Normal cognition and affect.  Alert and oriented          ASSESSMENT/PLAN  Assessment & Plan  1. Ulcerative pancolitis:  - Continue sulfasalazine 2000 mg twice a day.  - Schedule repeat colonoscopy for next year.  - Check folate level to ensure adequate absorption.    2. Thrombocytosis.  Platelet count of 527,000 on labs of February 2, 2025:  - Order CBC with automatic differential to follow up on elevated platelet count.    3. Acid reflux:  - Continue omeprazole 20 mg daily.  - Symptoms are well controlled with no difficulty swallowing, nausea, vomiting, " or blood in stool.  - Appetite and weight are stable.    4. Weight management:  - Continue Mounjaro with adjusted dosage to manage weight loss.  - Current BMI is 27, indicating overweight status.    5. Arthralgias:  - Monitor intermittent joint aches lasting 8 to 16 hours.  - No associated GI symptoms; may not be related to ulcerative colitis.      Aaron Montano MD  6/15/2025   11:25 EDT      Patient or patient representative verbalized consent for the use of Ambient Listening during the visit with  Aaron Montano MD for chart documentation. 6/15/2025  11:32 EDT

## 2025-07-07 RX ORDER — ACYCLOVIR 400 MG/1
1 TABLET ORAL
Qty: 1 EACH | Refills: 0 | Status: SHIPPED | OUTPATIENT
Start: 2025-07-07

## 2025-07-14 RX ORDER — TIRZEPATIDE 5 MG/.5ML
INJECTION, SOLUTION SUBCUTANEOUS
Qty: 2 ML | Refills: 5 | OUTPATIENT
Start: 2025-07-14

## 2025-07-26 DIAGNOSIS — F41.9 ANXIETY: ICD-10-CM

## 2025-07-27 RX ORDER — BUPROPION HYDROCHLORIDE 300 MG/1
300 TABLET ORAL DAILY
Qty: 90 TABLET | Refills: 1 | Status: SHIPPED | OUTPATIENT
Start: 2025-07-27

## 2025-07-30 RX ORDER — LISINOPRIL 10 MG/1
10 TABLET ORAL DAILY
Qty: 90 TABLET | Refills: 3 | Status: SHIPPED | OUTPATIENT
Start: 2025-07-30

## 2025-08-06 ENCOUNTER — OFFICE VISIT (OUTPATIENT)
Dept: ENDOCRINOLOGY | Facility: CLINIC | Age: 45
End: 2025-08-06
Payer: COMMERCIAL

## 2025-08-06 VITALS
WEIGHT: 179 LBS | HEART RATE: 71 BPM | DIASTOLIC BLOOD PRESSURE: 80 MMHG | SYSTOLIC BLOOD PRESSURE: 130 MMHG | HEIGHT: 69 IN | BODY MASS INDEX: 26.51 KG/M2

## 2025-08-06 DIAGNOSIS — Z79.4 TYPE 2 DIABETES MELLITUS WITH HYPERGLYCEMIA, WITH LONG-TERM CURRENT USE OF INSULIN: Primary | ICD-10-CM

## 2025-08-06 DIAGNOSIS — E03.9 HYPOTHYROIDISM, UNSPECIFIED TYPE: ICD-10-CM

## 2025-08-06 DIAGNOSIS — E78.5 HYPERLIPIDEMIA, UNSPECIFIED HYPERLIPIDEMIA TYPE: ICD-10-CM

## 2025-08-06 DIAGNOSIS — E11.65 TYPE 2 DIABETES MELLITUS WITH HYPERGLYCEMIA, WITH LONG-TERM CURRENT USE OF INSULIN: Primary | ICD-10-CM

## 2025-08-06 LAB
EXPIRATION DATE: ABNORMAL
EXPIRATION DATE: NORMAL
GLUCOSE BLDC GLUCOMTR-MCNC: 124 MG/DL (ref 70–130)
HBA1C MFR BLD: 4.3 % (ref 4.5–5.7)
Lab: ABNORMAL
Lab: NORMAL

## 2025-08-06 PROCEDURE — 84439 ASSAY OF FREE THYROXINE: CPT | Performed by: INTERNAL MEDICINE

## 2025-08-06 PROCEDURE — 82043 UR ALBUMIN QUANTITATIVE: CPT | Performed by: INTERNAL MEDICINE

## 2025-08-06 PROCEDURE — 80061 LIPID PANEL: CPT | Performed by: INTERNAL MEDICINE

## 2025-08-06 PROCEDURE — 82570 ASSAY OF URINE CREATININE: CPT | Performed by: INTERNAL MEDICINE

## 2025-08-06 PROCEDURE — 80053 COMPREHEN METABOLIC PANEL: CPT | Performed by: INTERNAL MEDICINE

## 2025-08-06 PROCEDURE — 84443 ASSAY THYROID STIM HORMONE: CPT | Performed by: INTERNAL MEDICINE

## 2025-08-06 RX ORDER — INSULIN GLARGINE 100 [IU]/ML
INJECTION, SOLUTION SUBCUTANEOUS
Start: 2025-08-06

## 2025-08-07 LAB
ALBUMIN SERPL-MCNC: 4.4 G/DL (ref 3.5–5.2)
ALBUMIN UR-MCNC: <1.2 MG/DL
ALBUMIN/GLOB SERPL: 1.4 G/DL
ALP SERPL-CCNC: 121 U/L (ref 39–117)
ALT SERPL W P-5'-P-CCNC: 23 U/L (ref 1–41)
ANION GAP SERPL CALCULATED.3IONS-SCNC: 12 MMOL/L (ref 5–15)
AST SERPL-CCNC: 24 U/L (ref 1–40)
BILIRUB SERPL-MCNC: 0.4 MG/DL (ref 0–1.2)
BUN SERPL-MCNC: 12 MG/DL (ref 6–20)
BUN/CREAT SERPL: 11.1 (ref 7–25)
CALCIUM SPEC-SCNC: 9.5 MG/DL (ref 8.6–10.5)
CHLORIDE SERPL-SCNC: 97 MMOL/L (ref 98–107)
CHOLEST SERPL-MCNC: 163 MG/DL (ref 0–200)
CO2 SERPL-SCNC: 25 MMOL/L (ref 22–29)
CREAT SERPL-MCNC: 1.08 MG/DL (ref 0.76–1.27)
CREAT UR-MCNC: 33.3 MG/DL
EGFRCR SERPLBLD CKD-EPI 2021: 85.7 ML/MIN/1.73
GLOBULIN UR ELPH-MCNC: 3.1 GM/DL
GLUCOSE SERPL-MCNC: 128 MG/DL (ref 65–99)
HDLC SERPL-MCNC: 64 MG/DL (ref 40–60)
LDLC SERPL CALC-MCNC: 84 MG/DL (ref 0–100)
LDLC/HDLC SERPL: 1.31 {RATIO}
MICROALBUMIN/CREAT UR: NORMAL MG/G{CREAT}
POTASSIUM SERPL-SCNC: 4 MMOL/L (ref 3.5–5.2)
PROT SERPL-MCNC: 7.5 G/DL (ref 6–8.5)
SODIUM SERPL-SCNC: 134 MMOL/L (ref 136–145)
T4 FREE SERPL-MCNC: 1.45 NG/DL (ref 0.92–1.68)
TRIGL SERPL-MCNC: 77 MG/DL (ref 0–150)
TSH SERPL DL<=0.05 MIU/L-ACNC: 3.62 UIU/ML (ref 0.27–4.2)
VLDLC SERPL-MCNC: 15 MG/DL (ref 5–40)

## 2025-08-27 RX ORDER — ACYCLOVIR 400 MG/1
1 TABLET ORAL
Qty: 9 EACH | Refills: 3 | Status: SHIPPED | OUTPATIENT
Start: 2025-08-27